# Patient Record
Sex: FEMALE | Race: WHITE | NOT HISPANIC OR LATINO | Employment: OTHER | URBAN - METROPOLITAN AREA
[De-identification: names, ages, dates, MRNs, and addresses within clinical notes are randomized per-mention and may not be internally consistent; named-entity substitution may affect disease eponyms.]

---

## 2017-01-05 ENCOUNTER — OFFICE VISIT (OUTPATIENT)
Dept: URGENT CARE | Facility: RETAIL CLINIC | Age: 77
End: 2017-01-05
Payer: COMMERCIAL

## 2017-01-05 VITALS
OXYGEN SATURATION: 92 % | DIASTOLIC BLOOD PRESSURE: 70 MMHG | HEART RATE: 75 BPM | TEMPERATURE: 100.3 F | SYSTOLIC BLOOD PRESSURE: 132 MMHG | RESPIRATION RATE: 24 BRPM

## 2017-01-05 DIAGNOSIS — J20.9 ACUTE BRONCHITIS WITH COEXISTING CONDITION REQUIRING PROPHYLACTIC TREATMENT: ICD-10-CM

## 2017-01-05 DIAGNOSIS — R05.9 COUGH: Primary | ICD-10-CM

## 2017-01-05 PROCEDURE — 99213 OFFICE O/P EST LOW 20 MIN: CPT | Performed by: NURSE PRACTITIONER

## 2017-01-05 RX ORDER — BENZONATATE 100 MG/1
100-200 CAPSULE ORAL 3 TIMES DAILY PRN
Qty: 42 CAPSULE | Refills: 0 | Status: SHIPPED | OUTPATIENT
Start: 2017-01-05 | End: 2017-01-17

## 2017-01-05 RX ORDER — AZITHROMYCIN 250 MG/1
TABLET, FILM COATED ORAL
Qty: 6 TABLET | Refills: 0 | Status: SHIPPED | OUTPATIENT
Start: 2017-01-05 | End: 2017-01-10

## 2017-01-05 NOTE — PROGRESS NOTES
SUBJECTIVE:   Keyla Mari is a 76 year old female presenting with a chief complaint of fever, nasal congestion, rhinorrhea, cough has also disrupted sleep, ear pain, facial pain/pressure, hoarse voice, headache, myalgias, malaise and SOB worse when lays down.  Onset of symptoms was 4 day(s) ago.  Course of illness is worsening.    Severity moderately severe  Current and Associated symptoms: noted.  Did have a precursor of symptoms a week prior.  Treatment measures tried include Rest.  Predisposing factors include Has notable Hx worth review.    Past Medical History   Diagnosis Date     Closed fracture of unspecified part of neck of femur      Hip fracture     Elevated blood pressure reading without diagnosis of hypertension      Acute, but ill-defined, cerebrovascular disease      Hemorrhage of gastrointestinal tract, unspecified 08/13/2007     Transfer to [facility]     Other and unspecified hyperlipidemia      CVA (cerebral infarction) 2005     HTN (hypertension)      Hyperlipidaemia      PONV (postoperative nausea and vomiting)      Unspecified cerebral artery occlusion with cerebral infarction      History of blood transfusion      after colonoscopy     Current Outpatient Prescriptions   Medication Sig Dispense Refill     azithromycin (ZITHROMAX) 250 MG tablet Take 2 tablets today, then take 1 tablet for the next 4 days. 6 tablet 0     benzonatate (TESSALON) 100 MG capsule Take 1-2 capsules (100-200 mg) by mouth 3 times daily as needed for cough 42 capsule 0     losartan-hydrochlorothiazide (HYZAAR) 100-25 MG per tablet Take 1 tablet by mouth daily 90 tablet 2     atenolol (TENORMIN) 100 MG tablet Take 1 tablet (100 mg) by mouth 2 times daily 180 tablet 2     simvastatin (ZOCOR) 20 MG tablet Take 1 tablet (20 mg) by mouth At Bedtime at bedtime. 90 tablet 2     amLODIPine (NORVASC) 5 MG tablet Take 0.5 tablets (2.5 mg) by mouth daily 45 tablet 3     aspirin 81 MG tablet Take 1 tablet (81 mg) by mouth  daily       [DISCONTINUED] doxazosin (CARDURA) 2 MG tablet Take 1 tablet (2 mg) by mouth At Bedtime One pill at bedtime. 14 tablet 0     Social History   Substance Use Topics     Smoking status: Former Smoker     Quit date: 03/16/1985     Smokeless tobacco: Never Used     Alcohol Use: No       ROS:  Review of systems negative except as stated above.    OBJECTIVE:  /70 mmHg  Pulse 75  Temp(Src) 100.3  F (37.9  C) (Tympanic)  Resp 24  SpO2 92%  GENERAL APPEARANCE: alert, moderate distress, severe distress and cooperative  EYES: EOMI,  PERRL, conjunctiva clear  HENT: ear canals and TM's normal.  Nose and mouth without ulcers, erythema or lesions  NECK: supple, nontender, no lymphadenopathy  RESP: expiratory wheezes throughout and inspiratory wheezes throughout  CV: regular rates and rhythm, normal S1 S2, no murmur noted  ABDOMEN:  soft, nontender, no HSM or masses and bowel sounds normal    ASSESSMENT:  Cough [R05]  Acute bronchitis with coexisting condition requiring prophylactic treatment [J20.9]    PLAN:  azithromycin (ZITHROMAX) 250 MG tablet  benzonatate (TESSALON) 100 MG capsule  Get plenty of rest & drink plenty of fluids (mainly water).  Take OTC, or medications prescribed to treat symptoms.  Mucinex is product known to help loosen congestion (generics are available.).   Dark Honey, such as Joe Wheat Honey has been shown to be helpful in cough management.  Avoid smoke (cigarettes or fireplace/wood burning stoves).  If you develop trouble breathing, swallowing or cough-up blood, immediately go to ER.  Using a vaporizer, humidifier, or steam from hot water to add moisture to the air can help  Follow-up with primary care provider if not improving with in 3 days or symptoms worsen.  A cough may last up to 2 weeks.    Jose KHANNA, MSN, Family NP-C  [facility]  January 5, 2017

## 2017-01-05 NOTE — MR AVS SNAPSHOT
"              After Visit Summary   2017    Keyla Mari    MRN: [id number]           Patient Information     Date Of Birth          1940        Visit Information        Provider Department      2017 11:10 AM Jose Allison APRN [facility]        Today's Diagnoses     Cough    -  1     Acute bronchitis with coexisting condition requiring prophylactic treatment            Follow-ups after your visit        Your next 10 appointments already scheduled     2017 10:40 AM   [facility]    [address] [place] [id number] [phone]           Bring a current list of meds and any records pertaining to this visit.  For Physicals, please bring immunization records and any forms needing to be filled out.  Please arrive 10 minutes early to complete paperwork.              Who to contact     You can reach your care team any time of the day by calling [phone].  Notification of test results:  If you have an abnormal lab result, we will notify you by phone as soon as possible.         Additional Information About Your Visit        [employer] Information     [employer] lets you send messages to your doctor, view your test results, renew your prescriptions, schedule appointments and more. To sign up, go to [email or web address] . Click on \"Log in\" on the left side of the screen, which will take you to the Welcome page. Then click on \"Sign up Now\" on the right side of the page.     You will be asked to enter the access code listed below, as well as some personal information. Please follow the directions to create your username and password.     Your access code is: [email or web address]  Expires: 3/1/2017  1:15 PM     Your access code will  in 90 days. If you need help or a new code, please call your [facility] or [phone].        Care EveryWhere ID     This is your Care " EveryWhere ID. This could be used by other organizations to access your [place] medical records  [id number]        Your Vitals Were     Pulse Temperature Respirations Pulse Oximetry          75 100.3  F (37.9  C) (Tympanic) 24 92%         Blood Pressure from Last 3 Encounters:   01/05/17 132/70   12/13/16 150/76   12/01/16 150/100    Weight from Last 3 Encounters:   12/13/16 155 lb 1.9 oz (70.362 kg)   12/01/16 155 lb 12.8 oz (70.67 kg)   06/15/16 154 lb 12.8 oz (70.217 kg)              Today, you had the following     No orders found for display         Today's Medication Changes          These changes are accurate as of: 1/5/17 12:11 PM.  If you have any questions, ask your nurse or doctor.               Start taking these medicines.        Dose/Directions    azithromycin 250 MG tablet   Commonly known as:  ZITHROMAX   Used for:  Acute bronchitis with coexisting condition requiring prophylactic treatment   Started by:  Jose Allison APRN CNP        Take 2 tablets today, then take 1 tablet for the next 4 days.   Quantity:  6 tablet   Refills:  0       benzonatate 100 MG capsule   Commonly known as:  TESSALON   Used for:  Cough   Started by:  Jose Allison APRN CNP        Dose:  100-200 mg   Take 1-2 capsules (100-200 mg) by mouth 3 times daily as needed for cough   Quantity:  42 capsule   Refills:  0            Where to get your medicines      These medications were sent to [address]  [address], [facility] [address]     Phone:  [phone]    - azithromycin 250 MG tablet  - benzonatate 100 MG capsule             Primary Care Provider Office Phone # Fax #    Weston Raines [phone]       [facility] [address] [facility] [address]        Thank you!     Thank you for choosing [facility]  for your care. Our goal is always to provide you with excellent care. Hearing back from our patients is one way we can  continue to improve our services. Please take a few minutes to complete the written survey that you may receive in the mail after your visit with us. Thank you!             Your Updated Medication List - Protect others around you: Learn how to safely use, store and throw away your medicines at www.disposemymeds.org.          This list is accurate as of: 1/5/17 12:11 PM.  Always use your most recent med list.                   Brand Name Dispense Instructions for use    amLODIPine 5 MG tablet    NORVASC    45 tablet    Take 0.5 tablets (2.5 mg) by mouth daily       aspirin 81 MG tablet      Take 1 tablet (81 mg) by mouth daily       atenolol 100 MG tablet    TENORMIN    180 tablet    Take 1 tablet (100 mg) by mouth 2 times daily       azithromycin 250 MG tablet    ZITHROMAX    6 tablet    Take 2 tablets today, then take 1 tablet for the next 4 days.       benzonatate 100 MG capsule    TESSALON    42 capsule    Take 1-2 capsules (100-200 mg) by mouth 3 times daily as needed for cough       losartan-hydrochlorothiazide 100-25 MG per tablet    HYZAAR    90 tablet    Take 1 tablet by mouth daily       simvastatin 20 MG tablet    ZOCOR    90 tablet    Take 1 tablet (20 mg) by mouth At Bedtime at bedtime.

## 2017-01-17 ENCOUNTER — OFFICE VISIT (OUTPATIENT)
Dept: FAMILY MEDICINE | Facility: CLINIC | Age: 77
End: 2017-01-17
Payer: COMMERCIAL

## 2017-01-17 VITALS
HEIGHT: 63 IN | DIASTOLIC BLOOD PRESSURE: 74 MMHG | BODY MASS INDEX: 26.36 KG/M2 | RESPIRATION RATE: 18 BRPM | OXYGEN SATURATION: 96 % | WEIGHT: 148.8 LBS | HEART RATE: 71 BPM | SYSTOLIC BLOOD PRESSURE: 144 MMHG | TEMPERATURE: 96.9 F

## 2017-01-17 DIAGNOSIS — L57.0 ACTINIC KERATOSIS: Primary | ICD-10-CM

## 2017-01-17 PROCEDURE — 17000 DESTRUCT PREMALG LESION: CPT | Performed by: FAMILY MEDICINE

## 2017-01-17 PROCEDURE — 17003 DESTRUCT PREMALG LES 2-14: CPT | Performed by: FAMILY MEDICINE

## 2017-01-17 ASSESSMENT — PAIN SCALES - GENERAL: PAINLEVEL: NO PAIN (0)

## 2017-01-17 NOTE — MR AVS SNAPSHOT
"              After Visit Summary   2017    Keyla Mari    MRN: [id number]           Patient Information     Date Of Birth          1940        Visit Information        Provider Department      2017 10:40 AM Weston Raines MD [facility]        Today's Diagnoses     Actinic keratosis    -  1        Follow-ups after your visit        Who to contact     If you have questions or need follow up information about today's clinic visit or your schedule please contact [facility] directly at [phone].  Normal or non-critical lab and imaging results will be communicated to you by TeleFliphart, letter or phone within 4 business days after the clinic has received the results. If you do not hear from us within 7 days, please contact the clinic through TeleFliphart or phone. If you have a critical or abnormal lab result, we will notify you by phone as soon as possible.  Submit refill requests through [employer] or call your pharmacy and they will forward the refill request to us. Please allow 3 business days for your refill to be completed.          Additional Information About Your Visit        MyChart Information     [employer] lets you send messages to your doctor, view your test results, renew your prescriptions, schedule appointments and more. To sign up, go to [email or web address] . Click on \"Log in\" on the left side of the screen, which will take you to the Welcome page. Then click on \"Sign up Now\" on the right side of the page.     You will be asked to enter the access code listed below, as well as some personal information. Please follow the directions to create your username and password.     Your access code is: [email or web address]  Expires: 3/1/2017  1:15 PM     Your access code will  in 90 days. If you need help or a new code, please call your [facility] or [phone].        Care EveryWhere ID     This is your Care EveryWhere ID. This could be used by other " "organizations to access your [place] medical records  [id number]        Your Vitals Were     Pulse Temperature Respirations Height BMI (Body Mass Index) Pulse Oximetry    71 96.9  F (36.1  C) (Tympanic) 18 5' 2.7\" (1.593 m) 26.60 kg/m2 96%       Blood Pressure from Last 3 Encounters:   01/17/17 144/74   01/05/17 132/70   12/13/16 150/76    Weight from Last 3 Encounters:   01/17/17 148 lb 12.8 oz (67.495 kg)   12/13/16 155 lb 1.9 oz (70.362 kg)   12/01/16 155 lb 12.8 oz (70.67 kg)              Today, you had the following     No orders found for display       Primary Care Provider Office Phone # Fax #    Weston Raines [phone]       [facility] [address] [place] [address]        Thank you!     Thank you for choosing [facility]  for your care. Our goal is always to provide you with excellent care. Hearing back from our patients is one way we can continue to improve our services. Please take a few minutes to complete the written survey that you may receive in the mail after your visit with us. Thank you!             Your Updated Medication List - Protect others around you: Learn how to safely use, store and throw away your medicines at www.disposemymeds.org.          This list is accurate as of: 1/17/17 12:16 PM.  Always use your most recent med list.                   Brand Name Dispense Instructions for use    amLODIPine 5 MG tablet    NORVASC    45 tablet    Take 0.5 tablets (2.5 mg) by mouth daily       aspirin 81 MG tablet      Take 1 tablet (81 mg) by mouth daily       atenolol 100 MG tablet    TENORMIN    180 tablet    Take 1 tablet (100 mg) by mouth 2 times daily       losartan-hydrochlorothiazide 100-25 MG per tablet    HYZAAR    90 tablet    Take 1 tablet by mouth daily       simvastatin 20 MG tablet    ZOCOR    90 tablet    Take 1 tablet (20 mg) by mouth At Bedtime at bedtime.         "

## 2017-01-17 NOTE — NURSING NOTE
"Chief Complaint   Patient presents with     Derm Problem      Follow up- spot on nose and lower lip.        Initial /74 mmHg  Pulse 71  Temp(Src) 96.9  F (36.1  C) (Tympanic)  Resp 18  Ht 5' 2.7\" (1.593 m)  Wt 148 lb 12.8 oz (67.495 kg)  BMI 26.60 kg/m2  SpO2 96% Estimated body mass index is 26.6 kg/(m^2) as calculated from the following:    Height as of this encounter: 5' 2.7\" (1.593 m).    Weight as of this encounter: 148 lb 12.8 oz (67.495 kg).  BP completed using cuff size: regular  Health Maintenance Due   Topic Date Due     PNEUMOCOCCAL (2 of 2 - PCV13) 10/15/2008     Norma Kim, [facility]      "

## 2017-01-17 NOTE — PROGRESS NOTES
SUBJECTIVE:                                                    Keyla Mari is a 76 year old female who presents to clinic today for the following health issues:    Chief Complaint   Patient presents with     Derm Problem      Follow up- spot on nose and lower lip.       HPI:Keyla presents for treatment of spots on her nose and lower lip. These were biopsied approximately 1 month ago and found to be actinic keratosis so she is here today for cryotherapy as previously discussed.     PROBLEM LIST:  Patient Active Problem List    Diagnosis Date Noted     Lung mass, spiculated in the right upper lobe, 1.1 x 2.2 x 0.6 cm. 05/20/2015     Priority: Medium     HTN (hypertension) 05/13/2015     Priority: Medium     Mandibular soft tissue mass 05/13/2015     Priority: Medium     Nodule of right lung 06/05/2015     Absence of menstruation 07/31/2014     Transient cerebral ischemia 07/20/2014     Diagnosis updated by automated process. Provider to review and confirm.       Cerebral infarction (H) 06/25/2013     Diagnosis updated by automated process. Provider to review and confirm.       Sleep disorder 10/10/2012     Advanced directives, counseling/discussion 07/09/2012     Has a copy already here in clinic she states.  7/9/2012        Proximal humerus fracture 03/07/2011     CKD (chronic kidney disease) stage 3, GFR 30-59 ml/min 02/10/2011     Lumbar radiculopathy 02/09/2011     Hypertension goal BP (blood pressure) < 140/90 01/11/2011     HYPERLIPIDEMIA LDL GOAL <100 10/31/2010     Gout 10/11/2010     Displacement of lumbar intervertebral disc without myelopathy 04/07/2008     Transient cerebral ischemia 01/24/2006     Problem list name updated by automated process. Provider to review        PAST MEDICAL HISTORY:  Past Medical History   Diagnosis Date     Closed fracture of unspecified part of neck of femur      Hip fracture     Elevated blood pressure reading without diagnosis of hypertension      Acute, but  ill-defined, cerebrovascular disease      Hemorrhage of gastrointestinal tract, unspecified 08/13/2007     Transfer to [facility]     Other and unspecified hyperlipidemia      CVA (cerebral infarction) 2005     HTN (hypertension)      Hyperlipidaemia      PONV (postoperative nausea and vomiting)      Unspecified cerebral artery occlusion with cerebral infarction      History of blood transfusion      after colonoscopy     PAST SURGICAL HISTORY:  Past Surgical History   Procedure Laterality Date     Hc dilation/curettage diag/ther non ob  1984     D & C     Hc colonoscopy w biopsy  07/01/07     Colonoscopy  08/13/07     Surgical history of -        Left leg fx--had pinning     Thoracotomy Right 6/5/2015     Procedure: THORACOTOMY;  Surgeon: Yariel Lund MD;  Location: [facility] OR     Lobectomy lung Right 6/5/2015     Procedure: LOBECTOMY LUNG;  Surgeon: Yariel Lund MD;  Location:  OR     MEDICATIONS:  Current Outpatient Prescriptions   Medication Sig Dispense Refill     losartan-hydrochlorothiazide (HYZAAR) 100-25 MG per tablet Take 1 tablet by mouth daily 90 tablet 2     atenolol (TENORMIN) 100 MG tablet Take 1 tablet (100 mg) by mouth 2 times daily 180 tablet 2     simvastatin (ZOCOR) 20 MG tablet Take 1 tablet (20 mg) by mouth At Bedtime at bedtime. 90 tablet 2     amLODIPine (NORVASC) 5 MG tablet Take 0.5 tablets (2.5 mg) by mouth daily 45 tablet 3     aspirin 81 MG tablet Take 1 tablet (81 mg) by mouth daily       [DISCONTINUED] doxazosin (CARDURA) 2 MG tablet Take 1 tablet (2 mg) by mouth At Bedtime One pill at bedtime. 14 tablet 0      ALLERGIES:  Allergies   Allergen Reactions     Indomethacin GI Disturbance     Problem list and histories reviewed & adjusted, as indicated.    ROS:  Constitutional, HEENT, cardiovascular, pulmonary, gi and gu systems are negative, except as otherwise noted.    OBJECTIVE:                                                    /74 mmHg  Pulse 71   "Temp(Src) 96.9  F (36.1  C) (Tympanic)  Resp 18  Ht 5' 2.7\" (1.593 m)  Wt 148 lb 12.8 oz (67.495 kg)  BMI 26.60 kg/m2  SpO2 96%  Body mass index is 26.6 kg/(m^2).  General: Well-appearing female, alert, in no distress  Skin: Nose with small 2x2mm scabbed area of previously biopsied actinic keratosis. Similar rough patch of skin on lower lip, left of midline, outside of vermillion border which was previously biopsied and found to be actinic keratosis. Face is otherwise without worrisome lesions or rashes.    Diagnostic Test Results:  none      ASSESSMENT/PLAN:                                                    (L57.0) Actinic keratosis  (primary encounter diagnosis)  Comment: Keyla had two areas of previously biopsy-confirmed actinic keratosis, one on the tip of her nose, and one on the lower lip, left of midline. These were treated with cryotherapy in the standard fashion using the freeze-thaw technique x5.   Plan:   - Keep areas treated with cryotherapy moist with bacitracin or vaseline to prevent scabbing  - Follow-up in several weeks on progress, explained that re-treatment with cryotherapy will likely be necessary.    I, Linda Reid, am serving as a scribe; to document services personally performed by Weston Raines MD - based on data collection and the provider's statements to me.    Provider Disclosure:  I agree with above History, Review of Systems, Physical exam and Plan. I have reviewed the content of the documentation and have edited it as needed. I have personally performed the services documented here and the documentation accurately represents those services and the decisions I have made.    Electronically signed by:   Weston Raines MD      "

## 2017-01-31 ENCOUNTER — TELEPHONE (OUTPATIENT)
Dept: FAMILY MEDICINE | Facility: CLINIC | Age: 77
End: 2017-01-31

## 2017-01-31 NOTE — TELEPHONE ENCOUNTER
Pt returned call and bp check appt was scheduled.  Thank you,  Helen Constantino  Patient Representative

## 2017-01-31 NOTE — TELEPHONE ENCOUNTER
I have attempted to contact this patient by phone with the following results: left message to return my call on answering machine. Patient needs a free nurse only blood pressure check. Please schedule this for her if she calls back.        Panel Management Review      Patient has the following on her problem list:     Hypertension   Last three blood pressure readings:  BP Readings from Last 3 Encounters:   01/17/17 144/74   01/05/17 132/70   12/13/16 150/76     Blood pressure: Failed    HTN Guidelines:  Age 18-59 BP range:  Less than 140/90  Age 60-85 with Diabetes:  Less than 140/90  Age 60-85 without Diabetes:  less than 150/90      Composite cancer screening  Chart review shows that this patient is due/due soon for the following None  Summary:    Patient is due/failing the following:   BP CHECK    Action needed:   Patient needs nurse only appointment.    Type of outreach:    Phone, left message for patient to call back.     Questions for provider review:    None                                                                                                                                    Norma Kim, [facility]       Chart routed to myself to follow up .

## 2017-02-02 ENCOUNTER — ALLIED HEALTH/NURSE VISIT (OUTPATIENT)
Dept: FAMILY MEDICINE | Facility: CLINIC | Age: 77
End: 2017-02-02
Payer: COMMERCIAL

## 2017-02-02 VITALS — SYSTOLIC BLOOD PRESSURE: 132 MMHG | DIASTOLIC BLOOD PRESSURE: 68 MMHG

## 2017-02-02 DIAGNOSIS — I10 ESSENTIAL HYPERTENSION: Primary | ICD-10-CM

## 2017-02-02 PROCEDURE — 99207 ZZC NO CHARGE NURSE ONLY: CPT

## 2017-02-02 NOTE — NURSING NOTE
"Chief Complaint   Patient presents with     Hypertension     bp check       Initial /80 mmHg Estimated body mass index is 26.60 kg/(m^2) as calculated from the following:    Height as of 1/17/17: 5' 2.7\" (1.593 m).    Weight as of 1/17/17: 148 lb 12.8 oz (67.495 kg).  BP completed using cuff size: regular  Kaylie Whipple MA 2/2/2017        "

## 2017-05-03 DIAGNOSIS — E78.5 HYPERLIPIDEMIA LDL GOAL <100: ICD-10-CM

## 2017-05-03 NOTE — TELEPHONE ENCOUNTER
simvastatin (ZOCOR) 20 MG tablet  Last Written Prescription Date: 5/11/16  Last Fill Quantity: 90, # refills: 2  Last Office Visit with G, P or [facility] prescribing provider: 1/17/17       Lab Results   Component Value Date    CHOL 143 05/11/2016     Lab Results   Component Value Date    HDL 50 05/11/2016     Lab Results   Component Value Date    LDL 70 05/11/2016     Lab Results   Component Value Date    TRIG 113 05/11/2016     Lab Results   Component Value Date    CHOLHDLRATIO 3.2 03/30/2015

## 2017-05-05 RX ORDER — SIMVASTATIN 20 MG
TABLET ORAL
Qty: 90 TABLET | Refills: 0 | Status: SHIPPED | OUTPATIENT
Start: 2017-05-05 | End: 2017-08-01

## 2017-05-05 NOTE — TELEPHONE ENCOUNTER
Medication is being filled for 1 time refill only due to:  Future labs ordered Fasting lipid panel. .     Routing to scheduling for a lab appointment.     Barbi Madden RN

## 2017-05-06 DIAGNOSIS — I10 HYPERTENSION GOAL BP (BLOOD PRESSURE) < 140/90: ICD-10-CM

## 2017-05-08 RX ORDER — LOSARTAN POTASSIUM AND HYDROCHLOROTHIAZIDE 25; 100 MG/1; MG/1
TABLET ORAL
Qty: 90 TABLET | Refills: 1 | Status: SHIPPED | OUTPATIENT
Start: 2017-05-08 | End: 2017-08-07 | Stop reason: ALTCHOICE

## 2017-05-08 RX ORDER — ATENOLOL 100 MG/1
TABLET ORAL
Qty: 180 TABLET | Refills: 1 | Status: SHIPPED | OUTPATIENT
Start: 2017-05-08 | End: 2017-11-07

## 2017-05-08 NOTE — TELEPHONE ENCOUNTER
Prescription approved per [facility] Refill Protocol.    Barbi Madden RN

## 2017-05-08 NOTE — TELEPHONE ENCOUNTER
losartan-hydrochlorothiazide (HYZAAR) 100-25 MG per tablet     Last Written Prescription Date: 5/11/16  Last Fill Quantity: 90, # refills: 2  Last Office Visit with [facility], [facility] or  Health prescribing provider: 1/17/17       Potassium   Date Value Ref Range Status   05/11/2016 3.3 (L) 3.4 - 5.3 mmol/L Final     Creatinine   Date Value Ref Range Status   05/11/2016 0.90 0.52 - 1.04 mg/dL Final     BP Readings from Last 3 Encounters:   02/02/17 132/68   01/17/17 144/74   01/05/17 132/70           atenolol (TENORMIN) 100 MG tablet  Last Written Prescription Date: 5/11/16  Last Fill Quantity: 180, # refills: 2  Last Office Visit with [facility], [facility] or [facility] prescribing provider: 1/17/17       Potassium   Date Value Ref Range Status   05/11/2016 3.3 (L) 3.4 - 5.3 mmol/L Final     Creatinine   Date Value Ref Range Status   05/11/2016 0.90 0.52 - 1.04 mg/dL Final     BP Readings from Last 3 Encounters:   02/02/17 132/68   01/17/17 144/74   01/05/17 132/70

## 2017-05-17 DIAGNOSIS — I10 HYPERTENSION GOAL BP (BLOOD PRESSURE) < 140/90: ICD-10-CM

## 2017-05-17 DIAGNOSIS — E78.5 HYPERLIPIDEMIA LDL GOAL <100: ICD-10-CM

## 2017-05-17 RX ORDER — AMLODIPINE BESYLATE 5 MG/1
TABLET ORAL
Qty: 45 TABLET | Refills: 2 | Status: SHIPPED | OUTPATIENT
Start: 2017-05-17 | End: 2017-10-09 | Stop reason: DRUGHIGH

## 2017-05-17 NOTE — TELEPHONE ENCOUNTER
amlodipine      Last Written Prescription Date: 05/02/16  Last Fill Quantity: 45, # refills: 3  Last Office Visit with [facility], [facility] or [facility] prescribing provider: 01/17/17       Potassium   Date Value Ref Range Status   05/11/2016 3.3 (L) 3.4 - 5.3 mmol/L Final     Creatinine   Date Value Ref Range Status   05/11/2016 0.90 0.52 - 1.04 mg/dL Final     BP Readings from Last 3 Encounters:   02/02/17 132/68   01/17/17 144/74   01/05/17 132/70

## 2017-08-01 DIAGNOSIS — E78.5 HYPERLIPIDEMIA LDL GOAL <100: ICD-10-CM

## 2017-08-01 NOTE — TELEPHONE ENCOUNTER
Routing refill request to provider for review/approval because:  Labs not current:  Fasting lipid panel.     Routing to schedulers for lab appointment.   Routing to PCP for refill if appropriate.     Barbi Madden RN

## 2017-08-01 NOTE — TELEPHONE ENCOUNTER
simvastatin (ZOCOR) 20 MG tablet     Last Written Prescription Date: 5/5/17  Last Fill Quantity: 90, # refills: 0  Last Office Visit with G, P or [facility] prescribing provider: 1/17/17       Lab Results   Component Value Date    CHOL 143 05/11/2016     Lab Results   Component Value Date    HDL 50 05/11/2016     Lab Results   Component Value Date    LDL 70 05/11/2016     Lab Results   Component Value Date    TRIG 113 05/11/2016     Lab Results   Component Value Date    CHOLHDLRATIO 3.2 03/30/2015

## 2017-08-03 DIAGNOSIS — E78.5 HYPERLIPIDEMIA LDL GOAL <100: ICD-10-CM

## 2017-08-03 LAB
CHOLEST SERPL-MCNC: 144 MG/DL
HDLC SERPL-MCNC: 55 MG/DL
LDLC SERPL CALC-MCNC: 67 MG/DL
NONHDLC SERPL-MCNC: 89 MG/DL
TRIGL SERPL-MCNC: 108 MG/DL

## 2017-08-03 PROCEDURE — 36415 COLL VENOUS BLD VENIPUNCTURE: CPT | Performed by: FAMILY MEDICINE

## 2017-08-03 PROCEDURE — 80061 LIPID PANEL: CPT | Performed by: FAMILY MEDICINE

## 2017-08-03 RX ORDER — SIMVASTATIN 20 MG
TABLET ORAL
Qty: 90 TABLET | Refills: 0 | Status: SHIPPED | OUTPATIENT
Start: 2017-08-03 | End: 2017-08-07

## 2017-08-07 ENCOUNTER — OFFICE VISIT (OUTPATIENT)
Dept: FAMILY MEDICINE | Facility: CLINIC | Age: 77
End: 2017-08-07
Payer: COMMERCIAL

## 2017-08-07 ENCOUNTER — TELEPHONE (OUTPATIENT)
Dept: FAMILY MEDICINE | Facility: CLINIC | Age: 77
End: 2017-08-07

## 2017-08-07 VITALS
DIASTOLIC BLOOD PRESSURE: 76 MMHG | TEMPERATURE: 97.5 F | WEIGHT: 156 LBS | BODY MASS INDEX: 27.9 KG/M2 | SYSTOLIC BLOOD PRESSURE: 150 MMHG | HEART RATE: 67 BPM | OXYGEN SATURATION: 94 % | RESPIRATION RATE: 12 BRPM

## 2017-08-07 DIAGNOSIS — E78.5 HYPERLIPIDEMIA LDL GOAL <100: Primary | ICD-10-CM

## 2017-08-07 DIAGNOSIS — M10.9 GOUT: Primary | ICD-10-CM

## 2017-08-07 DIAGNOSIS — N18.30 CKD (CHRONIC KIDNEY DISEASE) STAGE 3, GFR 30-59 ML/MIN (H): ICD-10-CM

## 2017-08-07 DIAGNOSIS — M1A.9XX0 CHRONIC GOUT INVOLVING TOE OF LEFT FOOT, UNSPECIFIED CAUSE: ICD-10-CM

## 2017-08-07 DIAGNOSIS — Z23 ENCOUNTER FOR IMMUNIZATION: ICD-10-CM

## 2017-08-07 DIAGNOSIS — I10 HYPERTENSION GOAL BP (BLOOD PRESSURE) < 150/90: ICD-10-CM

## 2017-08-07 LAB
ANION GAP SERPL CALCULATED.3IONS-SCNC: 9 MMOL/L (ref 3–14)
BUN SERPL-MCNC: 12 MG/DL (ref 7–30)
CALCIUM SERPL-MCNC: 9.9 MG/DL (ref 8.5–10.1)
CHLORIDE SERPL-SCNC: 97 MMOL/L (ref 94–109)
CO2 SERPL-SCNC: 29 MMOL/L (ref 20–32)
CREAT SERPL-MCNC: 0.9 MG/DL (ref 0.52–1.04)
CREAT UR-MCNC: 74 MG/DL
ERYTHROCYTE [DISTWIDTH] IN BLOOD BY AUTOMATED COUNT: 13.6 % (ref 10–15)
GFR SERPL CREATININE-BSD FRML MDRD: 61 ML/MIN/1.7M2
GLUCOSE SERPL-MCNC: 114 MG/DL (ref 70–99)
HCT VFR BLD AUTO: 42.6 % (ref 35–47)
HGB BLD-MCNC: 14.3 G/DL (ref 11.7–15.7)
MCH RBC QN AUTO: 29.7 PG (ref 26.5–33)
MCHC RBC AUTO-ENTMCNC: 33.6 G/DL (ref 31.5–36.5)
MCV RBC AUTO: 88 FL (ref 78–100)
MICROALBUMIN UR-MCNC: 11 MG/L
MICROALBUMIN/CREAT UR: 14.78 MG/G CR (ref 0–25)
PLATELET # BLD AUTO: 231 10E9/L (ref 150–450)
POTASSIUM SERPL-SCNC: 3.7 MMOL/L (ref 3.4–5.3)
RBC # BLD AUTO: 4.82 10E12/L (ref 3.8–5.2)
SODIUM SERPL-SCNC: 135 MMOL/L (ref 133–144)
URATE SERPL-MCNC: 7.5 MG/DL (ref 2.6–6)
WBC # BLD AUTO: 6.9 10E9/L (ref 4–11)

## 2017-08-07 PROCEDURE — 80048 BASIC METABOLIC PNL TOTAL CA: CPT | Performed by: FAMILY MEDICINE

## 2017-08-07 PROCEDURE — 90670 PCV13 VACCINE IM: CPT | Performed by: FAMILY MEDICINE

## 2017-08-07 PROCEDURE — 84550 ASSAY OF BLOOD/URIC ACID: CPT | Performed by: FAMILY MEDICINE

## 2017-08-07 PROCEDURE — 36415 COLL VENOUS BLD VENIPUNCTURE: CPT | Performed by: FAMILY MEDICINE

## 2017-08-07 PROCEDURE — 85027 COMPLETE CBC AUTOMATED: CPT | Performed by: FAMILY MEDICINE

## 2017-08-07 PROCEDURE — 99213 OFFICE O/P EST LOW 20 MIN: CPT | Mod: 25 | Performed by: FAMILY MEDICINE

## 2017-08-07 PROCEDURE — 82043 UR ALBUMIN QUANTITATIVE: CPT | Performed by: FAMILY MEDICINE

## 2017-08-07 PROCEDURE — G0009 ADMIN PNEUMOCOCCAL VACCINE: HCPCS | Performed by: FAMILY MEDICINE

## 2017-08-07 RX ORDER — LOSARTAN POTASSIUM 100 MG/1
100 TABLET ORAL DAILY
Qty: 90 TABLET | Refills: 1 | Status: SHIPPED | OUTPATIENT
Start: 2017-08-07 | End: 2018-01-29

## 2017-08-07 RX ORDER — SIMVASTATIN 20 MG
20 TABLET ORAL AT BEDTIME
Qty: 90 TABLET | Refills: 3 | Status: SHIPPED | OUTPATIENT
Start: 2017-08-07 | End: 2018-10-23

## 2017-08-07 ASSESSMENT — PAIN SCALES - GENERAL: PAINLEVEL: MODERATE PAIN (5)

## 2017-08-07 NOTE — MR AVS SNAPSHOT
"              After Visit Summary   8/7/2017    Keyla Mari    MRN: [id number]           Patient Information     Date Of Birth          1940        Visit Information        Provider Department      8/7/2017 2:50 PM Weston Raines MD [facility]        Today's Diagnoses     Gout    -  1    Hyperlipidemia LDL goal <100        CKD (chronic kidney disease) stage 3, GFR 30-59 ml/min        Hypertension goal BP (blood pressure) < 150/90        Encounter for immunization           Follow-ups after your visit        Your next 10 appointments already scheduled     Sep 12, 2017  9:00 AM CDT   SHORT with Weston Raines MD   [facility] ([facility])    [address] [place] [id number] [phone]              Who to contact     If you have questions or need follow up information about today's clinic visit or your schedule please contact [facility] directly at [phone].  Normal or non-critical lab and imaging results will be communicated to you by MyChart, letter or phone within 4 business days after the clinic has received the results. If you do not hear from us within 7 days, please contact the clinic through [email or web address] or phone. If you have a critical or abnormal lab result, we will notify you by phone as soon as possible.  Submit refill requests through CliqSearch or call your pharmacy and they will forward the refill request to us. Please allow 3 business days for your refill to be completed.          Additional Information About Your Visit        [email or web address] Information     CliqSearch lets you send messages to your doctor, view your test results, renew your prescriptions, schedule appointments and more. To sign up, go to [email or web address] . Click on \"Log in\" on the left side of the screen, which will take you to the Welcome page. Then click on \"Sign up Now\" on the right side of the page.     You will be asked to enter the access code " listed below, as well as some personal information. Please follow the directions to create your username and password.     Your access code is: [email or web address]  Expires: 2017  4:59 PM     Your access code will  in 90 days. If you need help or a new code, please call your [place] clinic or [phone].        Care EveryWhere ID     This is your Care EveryWhere ID. This could be used by other organizations to access your [place] medical records  [id number]        Your Vitals Were     Pulse Temperature Respirations Pulse Oximetry BMI (Body Mass Index)       67 97.5  F (36.4  C) (Tympanic) 12 94% 27.9 kg/m2        Blood Pressure from Last 3 Encounters:   17 150/76   17 132/68   17 144/74    Weight from Last 3 Encounters:   17 156 lb (70.8 kg)   17 148 lb 12.8 oz (67.5 kg)   16 155 lb 1.9 oz (70.4 kg)              We Performed the Following     ADMIN 1st VACCINE     Basic metabolic panel     CBC with platelets     PNEUMOCOCCAL CONJ VACCINE 13 VALENT IM     Uric acid          Today's Medication Changes          These changes are accurate as of: 17  4:59 PM.  If you have any questions, ask your nurse or doctor.               Start taking these medicines.        Dose/Directions    losartan 100 MG tablet   Commonly known as:  COZAAR   Used for:  Hypertension goal BP (blood pressure) < 150/90   Started by:  Weston Raines MD        Dose:  100 mg   Take 1 tablet (100 mg) by mouth daily   Quantity:  90 tablet   Refills:  1         These medicines have changed or have updated prescriptions.        Dose/Directions    simvastatin 20 MG tablet   Commonly known as:  ZOCOR   This may have changed:  See the new instructions.   Used for:  Hyperlipidemia LDL goal <100   Changed by:  Weston Raines MD        Dose:  20 mg   Take 1 tablet (20 mg) by mouth At Bedtime   Quantity:  90 tablet   Refills:  3         Stop taking these medicines if you haven't already. Please contact your  care team if you have questions.     losartan-hydrochlorothiazide 100-25 MG per tablet   Commonly known as:  HYZAAR   Stopped by:  Weston Raines MD                Where to get your medicines      These medications were sent to [facility] - [place], [place] - [address]  [address], [facility] [address]     Phone:  [phone]     losartan 100 MG tablet    simvastatin 20 MG tablet                Primary Care Provider Office Phone # Fax #    Weston Raines [phone]       [facility] [address] [facility] [address]        Equal Access to Services     [facility]: Hadii aad ku hadasho Soomaali, waaxda luqadaha, qaybta kaalmada adeegyada, waxay idiin hayaan adeeg kharaeverett milligan . So [facility] [phone].    ATENCIÓN: Si habla español, tiene a singh disposición servicios gratuitos de asistencia lingüística. [facility] [phone].    We comply with applicable federal civil rights laws and [place] laws. We do not discriminate on the basis of race, color, national origin, age, disability sex, sexual orientation or gender identity.            Thank you!     Thank you for choosing [facility]  for your care. Our goal is always to provide you with excellent care. Hearing back from our patients is one way we can continue to improve our services. Please take a few minutes to complete the written survey that you may receive in the mail after your visit with us. Thank you!             Your Updated Medication List - Protect others around you: Learn how to safely use, store and throw away your medicines at www.disposemymeds.org.          This list is accurate as of: 8/7/17  4:59 PM.  Always use your most recent med list.                   Brand Name Dispense Instructions for use Diagnosis    amLODIPine 5 MG tablet    NORVASC    45 tablet    TAKE ONE-HALF TABLET BY MOUTH EVERY DAY    Hypertension goal BP (blood pressure) < 140/90, Hyperlipidemia LDL goal <100        aspirin 81 MG tablet      Take 1 tablet (81 mg) by mouth daily        atenolol 100 MG tablet    TENORMIN    180 tablet    TAKE ONE TABLET BY MOUTH TWO TIMES A DAY    Hypertension goal BP (blood pressure) < 140/90       losartan 100 MG tablet    COZAAR    90 tablet    Take 1 tablet (100 mg) by mouth daily    Hypertension goal BP (blood pressure) < 150/90       simvastatin 20 MG tablet    ZOCOR    90 tablet    Take 1 tablet (20 mg) by mouth At Bedtime    Hyperlipidemia LDL goal <100

## 2017-08-07 NOTE — TELEPHONE ENCOUNTER
Reason for Call:  Same Day Appointment, Requested Provider:  Weston Raines M.D.    PCP: Weston Raines    Reason for visit: Patient thinks her gout is back. Would like to be seen today by Dr. Raines     Duration of symptoms: started Friday    Have you been treated for this in the past? Yes    Additional comments:     Can we leave a detailed message on this number? YES    Phone number patient can be reached at: Home number on file [phone] (home)    Best Time: any    Call taken on 8/7/2017 at 8:00 AM by Tatiana Engel

## 2017-08-07 NOTE — PROGRESS NOTES
"Gout/ single inflamed joint   Onset: Friday, 3 days ago    Description:   Location: bottom of left foot   Swelling: YES, throughout foot  Redness: YES  Pain: YES, 10/10 at it's worse; Unable to ambulate on Saturday    Intensity: severe    Progression of Symptoms:  Improving, currently 4/10 in severity    Accompanying Signs & Symptoms:  Fevers: no     History:   Trauma to the area: no   Previous history of gout: YES   Recent illness:  no     Precipitating factors:   Diet-rich in purine: no  Alcohol use: no   Diuretic use: YES; Losartan + HCTZ    Alleviating factors:  None    Therapies Tried and outcome: none; improved on it's own    Physical exam: mild swelling in left foot, mainly throughout mid foot. Area is cool, no erythema. Pulses are strong and equal in bilateral LE.     Assessment and Plan:  (M10.9) Gout  (primary encounter diagnosis)  Comment: Serum uric acid level 7.5; Likely acute gout flare up; Reports she experiences this every \"couple of weeks\". Unable to take indomethacin d/t GI intolerance.  Plan:  D/C HCTZ; If no improvement over next 4 weeks, will discuss the option of starting Allopurinol    (E78.5) Hyperlipidemia LDL goal <100  Comment: Requesting refills, All required lab work ordered  Plan: simvastatin (ZOCOR) 20 MG tablet refills for 1 year            (N18.3) CKD (chronic kidney disease) stage 3, GFR 30-59 ml/min  Comment: Lab work today  Plan: Basic metabolic panel, CBC with platelets,         MICROALBUMIN            (I10) Hypertension goal BP (blood pressure) < 150/90  Comment: Appropriate BP for age.  Plan: losartan (COZAAR) 100 MG tablet        Recheck BP in 4-6 weeks    Patient seen and discussed with Dr. Raines. Assessment and plan reviewed with Dr. Raines and agreed upon as documented above.     April Natarajan, SPA-3  [facility] Physician Assistant Studies          I  obtained additional history, ROS, and  performed a additional appropriate examination. We then discussed the " History, ROS, and Physical examination, that was performed by both the student and myself.  I then made a diagnosis, and treatment plan and this was transcribed by the student as noted above and was reviewed by myself and signed of on at the time of service.     Weston Raines MD

## 2017-08-07 NOTE — NURSING NOTE
Screening Questionnaire for Adult Immunization    Are you sick today?   No   Do you have allergies to medications, food, a vaccine component or latex?   Yes   Have you ever had a serious reaction after receiving a vaccination?   No   Do you have a long-term health problem with heart disease, lung disease, asthma, kidney disease, metabolic disease (e.g. diabetes), anemia, or other blood disorder?   No   Do you have cancer, leukemia, HIV/AIDS, or any other immune system problem?   No   In the past 3 months, have you taken medications that affect  your immune system, such as prednisone, other steroids, or anticancer drugs; drugs for the treatment of rheumatoid arthritis, Crohn s disease, or psoriasis; or have you had radiation treatments?   No   Have you had a seizure, or a brain or other nervous system problem?   No   During the past year, have you received a transfusion of blood or blood     products, or been given immune (gamma) globulin or antiviral drug?   No   For women: Are you pregnant or is there a chance you could become        pregnant during the next month?   No   Have you received any vaccinations in the past 4 weeks?   No     Immunization questionnaire was positive for at least one answer.  Notified provider.      MNVFC doesn't apply on this patient    Per orders of Dr. Raines, injection of PCV13 given by Brisa Giang. Patient instructed to remain in clinic for 15 minutes afterwards, and to report any adverse reaction to me immediately.       Screening performed by Brisa Giang on 8/7/2017 at 3:40 PM.

## 2017-08-07 NOTE — NURSING NOTE
"Chief Complaint   Patient presents with     Musculoskeletal Problem     left foot pain- her whole foot is painful. Hurt worse over the weekend. Pain now 5/10 x4d       Initial /76 (BP Location: Left arm, Patient Position: Chair, Cuff Size: Adult Regular)  Pulse 67  Temp 97.5  F (36.4  C) (Tympanic)  Resp 12  Wt 156 lb (70.8 kg)  SpO2 94%  BMI 27.9 kg/m2 Estimated body mass index is 27.9 kg/(m^2) as calculated from the following:    Height as of 1/17/17: 5' 2.7\" (1.593 m).    Weight as of this encounter: 156 lb (70.8 kg).  Medication Reconciliation: complete   Health Maintenance Due   Topic Date Due     PNEUMOCOCCAL (2 of 2 - PCV13) 10/15/2008     CREATININE Q1 YEAR  05/11/2017     BMP Q1 YR  05/11/2017     HEMOGLOBIN Q1 YR  05/11/2017     MICROALBUMIN Q1 YEAR  05/11/2017     FALL RISK ASSESSMENT  06/15/2017     ADVANCE DIRECTIVE PLANNING Q5 YRS  07/09/2017     Norma Kim, [facility]      "

## 2017-08-11 ENCOUNTER — NURSE TRIAGE (OUTPATIENT)
Dept: NURSING | Facility: CLINIC | Age: 77
End: 2017-08-11

## 2017-08-11 ENCOUNTER — TELEPHONE (OUTPATIENT)
Dept: NURSING | Facility: CLINIC | Age: 77
End: 2017-08-11

## 2017-08-11 DIAGNOSIS — M10.9 GOUT: Primary | ICD-10-CM

## 2017-08-11 RX ORDER — PREDNISONE 20 MG/1
20 TABLET ORAL DAILY
Qty: 5 TABLET | Refills: 0 | Status: SHIPPED | OUTPATIENT
Start: 2017-08-11 | End: 2017-09-12

## 2017-08-11 NOTE — TELEPHONE ENCOUNTER
"  Reason for Disposition    Caller has NON-URGENT medication question about med that PCP prescribed and triager unable to answer question    Additional Information    Negative: Drug overdose and nurse unable to answer question    Negative: Caller requesting information not related to medicine    Negative: Caller requesting a prescription for Strep throat and has a positive culture result    Negative: Rash while taking a medication or within 3 days of stopping it    Negative: Immunization reaction suspected    Negative: [1] Asthma and [2] having symptoms of asthma (cough, wheezing, etc)    Negative: MORE THAN A DOUBLE DOSE of a prescription or over-the-counter (OTC) drug    Negative: [1] DOUBLE DOSE (an extra dose or lesser amount) of over-the-counter (OTC) drug AND [2] any symptoms (e.g., dizziness, nausea, pain, sleepiness)    Negative: [1] DOUBLE DOSE (an extra dose or lesser amount) of prescription drug AND [2] any symptoms (e.g., dizziness, nausea, pain, sleepiness)    Negative: Took another person's prescription drug    Negative: [1] DOUBLE DOSE (an extra dose or lesser amount) of prescription drug AND [2] NO symptoms (Exception: a double dose of antibiotics)    Negative: Diabetes drug error or overdose (e.g., insulin or extra dose)    Negative: [1] Request for URGENT new prescription or refill of \"essential\" medication (i.e., likelihood of harm to patient if not taken) AND [2] triager unable to fill per unit policy    Negative: [1] Prescription not at pharmacy AND [2] was prescribed today by PCP    Negative: Pharmacy calling with prescription questions and triager unable to answer question    Negative: Caller has URGENT medication question about med that PCP prescribed and triager unable to answer question    Answer Assessment - Initial Assessment Questions  1. SYMPTOMS: \"Do you have any symptoms?\"      Gout in  Foot, swelling and pain   2. SEVERITY: If symptoms are present, ask \"Are they mild, moderate or " "severe?\"     Moderate    Protocols used: MEDICATION QUESTION CALL-ADULT-JD Olsen was seen for gout on Monday, and was advised to stop use of  HCTZ, and if no improvement in symptoms to  check back in 4 weeks. She thought the provider was sending a prescription for her, and has been to the pharmacy  Looking for this. No prescription was  Advised at that visit, no medication to send. The foot continues to be swollen, red and painful. She has not tolerated indomethacin in past due to GI issues, and no  rx was given . She tried Ibuprofen once this week with no change in pain .  Today she  Thinks the foot may be more swollen, and asks what to do . A recheck of  Symptoms  In urgent care is advised to see if provider will get her something to relieve symptoms without waiting for primary on Monday .   "

## 2017-08-12 NOTE — TELEPHONE ENCOUNTER
"Seen in clinic by PCP, Dr. Raines on 8/7/17. Having acute gout flare up in L foot. Cannot take indomethicin due to GI intolerance. Visit note states:      Assessment and Plan:  (M10.9) Gout  (primary encounter diagnosis)  Comment: Serum uric acid level 7.5; Likely acute gout flare up; Reports she experiences this every \"couple of weeks\". Unable to take indomethacin d/t GI intolerance.  Plan:  D/C HCTZ; If no improvement over next 4 weeks, will discuss the option of starting Allopurinol    [facility] states pt there now. Pt stated to pharmacist that she spoke w/ someone at the [facility] today (8/11) about getting medication today for her gout as she is having a lot of pain. Pt states to pharmacist she was told an Rx would be prescribed for her gout today. No record of this conversation in EHR and no Rx for gout sent today. Pt states she does not want to wait;  Wants med for her gout today. On-call Dr. Christiaanson called on cell @7:10pm. Pt has CKD, state 3 so cannot use colchicine. Allopurinol not appropriate for acute flare up. Order received: prednisone 20mg by mouth daily for 5 days; pt to call clinic on Mon 8/14 if not improving per Dr. Magallanes. Rx sent to [facility]. Pharmacist will discuss w/ patient. Barbie Reynolds RN/FNA       "

## 2017-08-12 NOTE — TELEPHONE ENCOUNTER
"  Reason for Disposition    [1] Prescription not at pharmacy AND [2] was prescribed today by PCP    Additional Information    Negative: Drug overdose and nurse unable to answer question    Negative: Caller requesting information not related to medicine    Negative: Caller requesting a prescription for Strep throat and has a positive culture result    Negative: Rash while taking a medication or within 3 days of stopping it    Negative: Immunization reaction suspected    Negative: [1] Asthma and [2] having symptoms of asthma (cough, wheezing, etc)    Negative: MORE THAN A DOUBLE DOSE of a prescription or over-the-counter (OTC) drug    Negative: [1] DOUBLE DOSE (an extra dose or lesser amount) of over-the-counter (OTC) drug AND [2] any symptoms (e.g., dizziness, nausea, pain, sleepiness)    Negative: [1] DOUBLE DOSE (an extra dose or lesser amount) of prescription drug AND [2] any symptoms (e.g., dizziness, nausea, pain, sleepiness)    Negative: Took another person's prescription drug    Negative: [1] DOUBLE DOSE (an extra dose or lesser amount) of prescription drug AND [2] NO symptoms (Exception: a double dose of antibiotics)    Negative: Diabetes drug error or overdose (e.g., insulin or extra dose)    Negative: [1] Request for URGENT new prescription or refill of \"essential\" medication (i.e., likelihood of harm to patient if not taken) AND [2] triager unable to fill per unit policy    Protocols used: MEDICATION QUESTION CALL-ADULT-    "

## 2017-09-12 ENCOUNTER — OFFICE VISIT (OUTPATIENT)
Dept: FAMILY MEDICINE | Facility: CLINIC | Age: 77
End: 2017-09-12
Payer: COMMERCIAL

## 2017-09-12 VITALS
SYSTOLIC BLOOD PRESSURE: 138 MMHG | WEIGHT: 156 LBS | RESPIRATION RATE: 12 BRPM | OXYGEN SATURATION: 98 % | TEMPERATURE: 96.4 F | HEART RATE: 66 BPM | BODY MASS INDEX: 27.9 KG/M2 | DIASTOLIC BLOOD PRESSURE: 78 MMHG

## 2017-09-12 DIAGNOSIS — M1A.0790 IDIOPATHIC CHRONIC GOUT OF FOOT WITHOUT TOPHUS, UNSPECIFIED LATERALITY: Primary | ICD-10-CM

## 2017-09-12 DIAGNOSIS — Z23 NEED FOR PROPHYLACTIC VACCINATION AND INOCULATION AGAINST INFLUENZA: ICD-10-CM

## 2017-09-12 PROCEDURE — 99214 OFFICE O/P EST MOD 30 MIN: CPT | Mod: 25 | Performed by: FAMILY MEDICINE

## 2017-09-12 PROCEDURE — 90662 IIV NO PRSV INCREASED AG IM: CPT | Performed by: FAMILY MEDICINE

## 2017-09-12 PROCEDURE — G0008 ADMIN INFLUENZA VIRUS VAC: HCPCS | Performed by: FAMILY MEDICINE

## 2017-09-12 RX ORDER — PREDNISONE 20 MG/1
40 TABLET ORAL DAILY
Qty: 60 TABLET | Refills: 0 | Status: SHIPPED | OUTPATIENT
Start: 2017-09-12 | End: 2017-09-17

## 2017-09-12 RX ORDER — ALLOPURINOL 100 MG/1
100 TABLET ORAL DAILY
Qty: 90 TABLET | Refills: 1 | Status: SHIPPED | OUTPATIENT
Start: 2017-09-12 | End: 2017-10-09

## 2017-09-12 ASSESSMENT — PAIN SCALES - GENERAL: PAINLEVEL: MODERATE PAIN (5)

## 2017-09-12 NOTE — MR AVS SNAPSHOT
"              After Visit Summary   2017    Keyla Mari    MRN: [id number]           Patient Information     Date Of Birth          1940        Visit Information        Provider Department      2017 9:00 AM Weston Raines MD [facility]        Today's Diagnoses     Idiopathic chronic gout of foot without tophus, unspecified laterality    -  1    Need for prophylactic vaccination and inoculation against influenza           Follow-ups after your visit        Who to contact     If you have questions or need follow up information about today's clinic visit or your schedule please contact [facility] directly at [phone].  Normal or non-critical lab and imaging results will be communicated to you by MyChart, letter or phone within 4 business days after the clinic has received the results. If you do not hear from us within 7 days, please contact the clinic through [email or web address] or phone. If you have a critical or abnormal lab result, we will notify you by phone as soon as possible.  Submit refill requests through [employer] or call your pharmacy and they will forward the refill request to us. Please allow 3 business days for your refill to be completed.          Additional Information About Your Visit        MyChart Information     [employer] lets you send messages to your doctor, view your test results, renew your prescriptions, schedule appointments and more. To sign up, go to [email or web address] . Click on \"Log in\" on the left side of the screen, which will take you to the Welcome page. Then click on \"Sign up Now\" on the right side of the page.     You will be asked to enter the access code listed below, as well as some personal information. Please follow the directions to create your username and password.     Your access code is: [email or web address]  Expires: 2017  4:59 PM     Your access code will  in 90 days. If you need help or a new code, please call your " [facility] or [phone].        Care EveryWhere ID     This is your Care EveryWhere ID. This could be used by other organizations to access your [facility] medical records  [id number]        Your Vitals Were     Pulse Temperature Respirations Pulse Oximetry BMI (Body Mass Index)       66 96.4  F (35.8  C) (Tympanic) 12 98% 27.9 kg/m2        Blood Pressure from Last 3 Encounters:   09/12/17 138/78   08/07/17 150/76   02/02/17 132/68    Weight from Last 3 Encounters:   09/12/17 156 lb (70.8 kg)   08/07/17 156 lb (70.8 kg)   01/17/17 148 lb 12.8 oz (67.5 kg)              We Performed the Following     FLU VACCINE, INCREASED ANTIGEN, PRESV FREE, AGE 65+ [94659]     Vaccine Administration, Initial [60054]          Today's Medication Changes          These changes are accurate as of: 9/12/17  9:50 AM.  If you have any questions, ask your nurse or doctor.               Start taking these medicines.        Dose/Directions    allopurinol 100 MG tablet   Commonly known as:  ZYLOPRIM   Used for:  Idiopathic chronic gout of foot without tophus, unspecified laterality   Started by:  Weston Raines MD        Dose:  100 mg   Take 1 tablet (100 mg) by mouth daily   Quantity:  90 tablet   Refills:  1       predniSONE 20 MG tablet   Commonly known as:  DELTASONE   Used for:  Idiopathic chronic gout of foot without tophus, unspecified laterality   Started by:  Weston Raines MD        Dose:  40 mg   Take 2 tablets (40 mg) by mouth daily for 5 days May repeat with gout episodes   Quantity:  60 tablet   Refills:  0            Where to get your medicines      These medications were sent to [facility] [id number] - [place] - [address]  [address], [facility] [address]     Phone:  [phone]     allopurinol 100 MG tablet    predniSONE 20 MG tablet                Primary Care Provider Office Phone # Fax #    Weston Raines [phone] [address] [place] [address]        Equal Access to  Services     [facility]: Hadii aad ku hadsultanaraj Sopablo, waaxda luqadaha, qaybta kaalmada shadiajerardoannalisa, jannet milligan . So [facility] [phone].    ATENCIÓN: Si phoebe elizabeth, tiene a singh disposición servicios gratuitos de asistencia lingüística. Llame al [phone].    We comply with applicable federal civil rights laws and [place] laws. We do not discriminate on the basis of race, color, national origin, age, disability sex, sexual orientation or gender identity.            Thank you!     Thank you for choosing [facility]  for your care. Our goal is always to provide you with excellent care. Hearing back from our patients is one way we can continue to improve our services. Please take a few minutes to complete the written survey that you may receive in the mail after your visit with us. Thank you!             Your Updated Medication List - Protect others around you: Learn how to safely use, store and throw away your medicines at www.disposemymeds.org.          This list is accurate as of: 9/12/17  9:50 AM.  Always use your most recent med list.                   Brand Name Dispense Instructions for use Diagnosis    allopurinol 100 MG tablet    ZYLOPRIM    90 tablet    Take 1 tablet (100 mg) by mouth daily    Idiopathic chronic gout of foot without tophus, unspecified laterality       amLODIPine 5 MG tablet    NORVASC    45 tablet    TAKE ONE-HALF TABLET BY MOUTH EVERY DAY    Hypertension goal BP (blood pressure) < 140/90, Hyperlipidemia LDL goal <100       aspirin 81 MG tablet      Take 1 tablet (81 mg) by mouth daily        atenolol 100 MG tablet    TENORMIN    180 tablet    TAKE ONE TABLET BY MOUTH TWO TIMES A DAY    Hypertension goal BP (blood pressure) < 140/90       losartan 100 MG tablet    COZAAR    90 tablet    Take 1 tablet (100 mg) by mouth daily    Hypertension goal BP (blood pressure) < 150/90       predniSONE 20 MG tablet    DELTASONE    60 tablet    Take 2  tablets (40 mg) by mouth daily for 5 days May repeat with gout episodes    Idiopathic chronic gout of foot without tophus, unspecified laterality       simvastatin 20 MG tablet    ZOCOR    90 tablet    Take 1 tablet (20 mg) by mouth At Bedtime    Hyperlipidemia LDL goal <100

## 2017-09-12 NOTE — NURSING NOTE
"Chief Complaint   Patient presents with     Hypertension     follow up     Hyperlipidemia     follow up     Arthritis     gout flare up- rt foot x1d       Initial BP (!) 160/92 (BP Location: Right arm, Patient Position: Chair, Cuff Size: Adult Regular)  Pulse 66  Temp 96.4  F (35.8  C) (Tympanic)  Resp 12  Wt 156 lb (70.8 kg)  SpO2 98%  BMI 27.9 kg/m2 Estimated body mass index is 27.9 kg/(m^2) as calculated from the following:    Height as of 1/17/17: 5' 2.7\" (1.593 m).    Weight as of this encounter: 156 lb (70.8 kg).  Medication Reconciliation: complete   Health Maintenance Due   Topic Date Due     INFLUENZA VACCINE (SYSTEM ASSIGNED)  09/01/2017     Norma Kim, [facility]      "

## 2017-09-12 NOTE — PROGRESS NOTES
Injectable Influenza Immunization Documentation    1.  Are you sick today? (Fever of 100.5 or higher on the day of the clinic)   No    2.  Have you ever had Guillain-Wayland Syndrome within 6 weeks of an influenza vaccionation?  No    3. Do you have a life-threatening allergy to eggs?  No    4. Do you have a life-threatening allergy to a component of the vaccine? May include antibiotics, gelatin or latex.  No     5. Have you ever had a reaction to a dose of flu vaccine that needed immediate medical attention?  No     Form completed by Nicole Connor CMA (AAMA)

## 2017-09-12 NOTE — PROGRESS NOTES
SUBJECTIVE:   Keyla Mari is a 77 year old female who presents to clinic today for the following health issues:      Hyperlipidemia Follow-Up      Rate your low fat/cholesterol diet?: good    Taking statin?  Yes, no muscle aches from statin    Other lipid medications/supplements?:  none    Hypertension Follow-up      Outpatient blood pressures are being checked at home.  Results are systolic 130-135, diastolic 70.    Low Salt Diet: low salt          Amount of exercise or physical activity: 6-7 days/week for an average of 30-45 minutes    Problems taking medications regularly: No    Medication side effects: a gout medication- indomethacin- gave her nausea  Diet: low salt          Problem list and histories reviewed & adjusted, as indicated.  Additional history: as documented    Still bothered with gout flares more frequent     Reviewed and updated as needed this visit by clinical staff     Reviewed and updated as needed this visit by Provider         ROS:  Constitutional, HEENT, cardiovascular, pulmonary, gi and gu systems are negative, except as otherwise noted.  Pain in big toes of both feet from gout     OBJECTIVE:   /78  Pulse 66  Temp 96.4  F (35.8  C) (Tympanic)  Resp 12  Wt 156 lb (70.8 kg)  SpO2 98%  BMI 27.9 kg/m2  Body mass index is 27.9 kg/(m^2).   GENERAL: healthy, alert and no distress  RESP: lungs clear to auscultation - no rales, rhonchi or wheezes  CV: regular rate and rhythm, normal S1 S2, no S3 or S4, no murmur, click or rub, no peripheral edema and peripheral pulses strong  MS: redness and swelling MP joint of R great toe     Diagnostic Test Results:  none     ASSESSMENT:       PLAN:   1. Idiopathic chronic gout of foot without tophus, unspecified laterality  Will start a daily control med daily I gave her prednisone which has worked great for acute flare ups   - allopurinol (ZYLOPRIM) 100 MG tablet; Take 1 tablet (100 mg) by mouth daily  Dispense: 90 tablet; Refill: 1  -  predniSONE (DELTASONE) 20 MG tablet; Take 2 tablets (40 mg) by mouth daily for 5 days May repeat with gout episodes  Dispense: 60 tablet; Refill: 0    2. Need for prophylactic vaccination and inoculation against influenza    - FLU VACCINE, INCREASED ANTIGEN, PRESV FREE, AGE 65+ [93833]  - Vaccine Administration, Initial [07165]      Weston Raines MD  [facility]

## 2017-09-12 NOTE — NURSING NOTE
Prior to injection verified patient identity using patient's name and date of birth.  Per orders of Dr. Raines, injection of influenza given by Nicole Connor. Patient instructed to remain in clinic for 15 minutes afterwards, and to report any adverse reaction to me immediately.

## 2017-10-09 ENCOUNTER — HOSPITAL ENCOUNTER (EMERGENCY)
Facility: CLINIC | Age: 77
Discharge: HOME | End: 2017-10-09
Attending: FAMILY MEDICINE | Admitting: FAMILY MEDICINE
Payer: MEDICARE

## 2017-10-09 ENCOUNTER — TELEPHONE (OUTPATIENT)
Dept: FAMILY MEDICINE | Facility: CLINIC | Age: 77
End: 2017-10-09

## 2017-10-09 ENCOUNTER — APPOINTMENT (OUTPATIENT)
Dept: CT IMAGING | Facility: CLINIC | Age: 77
End: 2017-10-09
Attending: FAMILY MEDICINE
Payer: MEDICARE

## 2017-10-09 VITALS
TEMPERATURE: 97.1 F | OXYGEN SATURATION: 97 % | DIASTOLIC BLOOD PRESSURE: 93 MMHG | WEIGHT: 153 LBS | SYSTOLIC BLOOD PRESSURE: 170 MMHG | HEIGHT: 64 IN | RESPIRATION RATE: 19 BRPM | HEART RATE: 61 BPM | BODY MASS INDEX: 26.12 KG/M2

## 2017-10-09 DIAGNOSIS — I10 HYPERTENSION GOAL BP (BLOOD PRESSURE) < 140/90: ICD-10-CM

## 2017-10-09 DIAGNOSIS — Z87.891 PERSONAL HISTORY OF TOBACCO USE, PRESENTING HAZARDS TO HEALTH: ICD-10-CM

## 2017-10-09 DIAGNOSIS — I10 HYPERTENSION, UNSPECIFIED TYPE: ICD-10-CM

## 2017-10-09 DIAGNOSIS — E78.5 HYPERLIPIDEMIA LDL GOAL <100: ICD-10-CM

## 2017-10-09 LAB
ALBUMIN SERPL-MCNC: 4.1 G/DL (ref 3.4–5)
ALP SERPL-CCNC: 97 U/L (ref 40–150)
ALT SERPL W P-5'-P-CCNC: 22 U/L (ref 0–50)
ANION GAP SERPL CALCULATED.3IONS-SCNC: 7 MMOL/L (ref 3–14)
AST SERPL W P-5'-P-CCNC: 16 U/L (ref 0–45)
BASOPHILS # BLD AUTO: 0.1 10E9/L (ref 0–0.2)
BASOPHILS NFR BLD AUTO: 0.7 %
BILIRUB SERPL-MCNC: 0.8 MG/DL (ref 0.2–1.3)
BUN SERPL-MCNC: 10 MG/DL (ref 7–30)
CALCIUM SERPL-MCNC: 9.4 MG/DL (ref 8.5–10.1)
CHLORIDE SERPL-SCNC: 104 MMOL/L (ref 94–109)
CO2 SERPL-SCNC: 27 MMOL/L (ref 20–32)
CREAT SERPL-MCNC: 0.93 MG/DL (ref 0.52–1.04)
DIFFERENTIAL METHOD BLD: ABNORMAL
EOSINOPHIL # BLD AUTO: 0.1 10E9/L (ref 0–0.7)
EOSINOPHIL NFR BLD AUTO: 1.3 %
ERYTHROCYTE [DISTWIDTH] IN BLOOD BY AUTOMATED COUNT: 13.7 % (ref 10–15)
GFR SERPL CREATININE-BSD FRML MDRD: 58 ML/MIN/1.7M2
GLUCOSE SERPL-MCNC: 120 MG/DL (ref 70–99)
HCT VFR BLD AUTO: 47 % (ref 35–47)
HGB BLD-MCNC: 15.4 G/DL (ref 11.7–15.7)
IMM GRANULOCYTES # BLD: 0 10E9/L (ref 0–0.4)
IMM GRANULOCYTES NFR BLD: 0.1 %
LYMPHOCYTES # BLD AUTO: 1.5 10E9/L (ref 0.8–5.3)
LYMPHOCYTES NFR BLD AUTO: 21.3 %
MCH RBC QN AUTO: 29.4 PG (ref 26.5–33)
MCHC RBC AUTO-ENTMCNC: 32.8 G/DL (ref 31.5–36.5)
MCV RBC AUTO: 90 FL (ref 78–100)
MONOCYTES # BLD AUTO: 0.6 10E9/L (ref 0–1.3)
MONOCYTES NFR BLD AUTO: 8.2 %
NEUTROPHILS # BLD AUTO: 4.7 10E9/L (ref 1.6–8.3)
NEUTROPHILS NFR BLD AUTO: 68.4 %
PLATELET # BLD AUTO: 211 10E9/L (ref 150–450)
POTASSIUM SERPL-SCNC: 4.1 MMOL/L (ref 3.4–5.3)
PROT SERPL-MCNC: 7.9 G/DL (ref 6.8–8.8)
RBC # BLD AUTO: 5.24 10E12/L (ref 3.8–5.2)
SODIUM SERPL-SCNC: 138 MMOL/L (ref 133–144)
TROPONIN I SERPL-MCNC: <0.015 UG/L (ref 0–0.04)
WBC # BLD AUTO: 6.9 10E9/L (ref 4–11)

## 2017-10-09 PROCEDURE — 70450 CT HEAD/BRAIN W/O DYE: CPT

## 2017-10-09 PROCEDURE — 85025 COMPLETE CBC W/AUTO DIFF WBC: CPT | Performed by: EMERGENCY MEDICINE

## 2017-10-09 PROCEDURE — 99285 EMERGENCY DEPT VISIT HI MDM: CPT | Mod: 25 | Performed by: FAMILY MEDICINE

## 2017-10-09 PROCEDURE — 80053 COMPREHEN METABOLIC PANEL: CPT | Performed by: EMERGENCY MEDICINE

## 2017-10-09 PROCEDURE — 25000132 ZZH RX MED GY IP 250 OP 250 PS 637: Performed by: EMERGENCY MEDICINE

## 2017-10-09 PROCEDURE — 93010 ELECTROCARDIOGRAM REPORT: CPT | Mod: Z6 | Performed by: FAMILY MEDICINE

## 2017-10-09 PROCEDURE — 84484 ASSAY OF TROPONIN QUANT: CPT | Performed by: EMERGENCY MEDICINE

## 2017-10-09 PROCEDURE — 93005 ELECTROCARDIOGRAM TRACING: CPT | Performed by: FAMILY MEDICINE

## 2017-10-09 RX ORDER — ASPIRIN 81 MG/1
324 TABLET, CHEWABLE ORAL ONCE
Status: COMPLETED | OUTPATIENT
Start: 2017-10-09 | End: 2017-10-09

## 2017-10-09 RX ORDER — AMLODIPINE BESYLATE 5 MG/1
5 TABLET ORAL DAILY
Qty: 30 TABLET | Refills: 0 | COMMUNITY
Start: 2017-10-09 | End: 2018-04-26

## 2017-10-09 RX ADMIN — ASPIRIN 81 MG 324 MG: 81 TABLET ORAL at 10:41

## 2017-10-09 ASSESSMENT — ENCOUNTER SYMPTOMS
VOMITING: 0
NUMBNESS: 1
HEADACHES: 1
NAUSEA: 0
NECK PAIN: 1
SHORTNESS OF BREATH: 0
WEAKNESS: 0
ARTHRALGIAS: 1

## 2017-10-09 NOTE — ED AVS SNAPSHOT
[facility] Emergency Department    [address] [place] [address]    Phone:  [phone]    Fax:  [phone]                                       Keyla Mari   MRN: [id number]    Department:  [facility] Emergency Department   Date of Visit:  10/9/2017           Patient Information     Date Of Birth          1940        Your diagnoses for this visit were:     Hypertension, unspecified type increased over baseline    Hypertension goal BP (blood pressure) < 140/90     Hyperlipidemia LDL goal <100        You were seen by Liang Tillman MD.      Follow-up Information     Follow up with Weston Raines MD.    Specialty:  Family Practice    Why:  1-2 weeks    Contact information:    [address] [place] [id number] [phone]          Discharge Instructions       Increase the Norvasc to one full tablet (5 mg) daily.  Recheck in clinic with Dr. Raines and 1-2 weeks.    Continue the rest of your current medications  Your lab work and CT scan were reassuring today.  Please return to the ED if you worsen or have any concerns.  It was nice visiting with both of you this morning.  I'm glad you are feeling better and hope you continue to improve.    Thank you for choosing [facility]. We appreciate the opportunity to meet your urgent medical needs. Please let us know if we could have done anything to make your stay more satisfying.    After discharge, please closely monitor for any new or worsening symptoms. Return to the Emergency Department if you develop any acute worsening signs or symptoms.    If you had lab work, cultures or imaging studies done during your stay, the final results may still be pending. We will call you if your plan of care needs to change. However, if you are not improving as expected, please follow up with your primary care provider or clinic.     Start any prescription medications that were prescribed to you and take them as  directed.     Please see additional handouts that may be pertinent to your condition.        24 Hour Appointment Hotline       To make an appointment at any [facility], call [phone] ([phone]). If you don't have a family doctor or clinic, we will help you find one. [facility] are conveniently located to serve the needs of you and your family.             Review of your medicines      CONTINUE these medicines which may have CHANGED, or have new prescriptions. If we are uncertain of the size of tablets/capsules you have at home, strength may be listed as something that might have changed.        Dose / Directions Last dose taken    amLODIPine 5 MG tablet   Commonly known as:  NORVASC   Dose:  5 mg   What changed:  Another medication with the same name was removed. Continue taking this medication, and follow the directions you see here.   Quantity:  30 tablet        Take 1 tablet (5 mg) by mouth daily   Refills:  0          Our records show that you are taking the medicines listed below. If these are incorrect, please call your family doctor or clinic.        Dose / Directions Last dose taken    aspirin 81 MG tablet   Dose:  81 mg        Take 1 tablet (81 mg) by mouth daily   Refills:  0        atenolol 100 MG tablet   Commonly known as:  TENORMIN   Quantity:  180 tablet        TAKE ONE TABLET BY MOUTH TWO TIMES A DAY   Refills:  1        losartan 100 MG tablet   Commonly known as:  COZAAR   Dose:  100 mg   Quantity:  90 tablet        Take 1 tablet (100 mg) by mouth daily   Refills:  1        simvastatin 20 MG tablet   Commonly known as:  ZOCOR   Dose:  20 mg   Quantity:  90 tablet        Take 1 tablet (20 mg) by mouth At Bedtime   Refills:  3                Procedures and tests performed during your visit     CBC with platelets differential    Cardiac Continuous Monitoring    Comprehensive metabolic panel    EKG 12-lead, tracing only    Head CT w/o contrast    Peripheral IV: Standard    Pulse  "oximetry nursing    Troponin I      Orders Needing Specimen Collection     None      Pending Results     No orders found from 10/7/2017 to 10/10/2017.            Pending Culture Results     No orders found from 10/7/2017 to 10/10/2017.            Pending Results Instructions     If you had any lab results that were not finalized at the time of your Discharge, you can call the ED Lab Result RN at [phone]. You will be contacted by this team for any positive Lab results or changes in treatment. The nurses are available 7 days a week from 10A to 6:30P.  You can leave a message 24 hours per day and they will return your call.        Thank you for choosing [facility]       Thank you for choosing [facility] for your care. Our goal is always to provide you with excellent care. Hearing back from our patients is one way we can continue to improve our services. Please take a few minutes to complete the written survey that you may receive in the mail after you visit with us. Thank you!        [employer] Information     [employer] lets you send messages to your doctor, view your test results, renew your prescriptions, schedule appointments and more. To sign up, go to [email or web address] . Click on \"Log in\" on the left side of the screen, which will take you to the Welcome page. Then click on \"Sign up Now\" on the right side of the page.     You will be asked to enter the access code listed below, as well as some personal information. Please follow the directions to create your username and password.     Your access code is: [email or web address]  Expires: 2017  4:59 PM     Your access code will  in 90 days. If you need help or a new code, please call your [facility] clinic or [phone].        Care EveryWhere ID     This is your Care EveryWhere ID. This could be used by other organizations to access your [facility] medical records  [id number]        Equal Access to Services     KATHY MILES: Preethi Mcbride, " arely mancia, jannet ortega. So [facility] [phone].    ATENCIÓN: Si habla español, tiene a singh disposición servicios gratuitos de asistencia lingüística. Llame al [phone].    We comply with applicable federal civil rights laws and [place] laws. We do not discriminate on the basis of race, color, national origin, age, disability, sex, sexual orientation, or gender identity.            After Visit Summary       This is your record. Keep this with you and show to your community pharmacist(s) and doctor(s) at your next visit.

## 2017-10-09 NOTE — ED NOTES
Patient presents with daughter with concerns for elevated BP's since Saturday. She reports headache, R) arm pain since waking this morning at 0600. Mitra Moreno RN

## 2017-10-09 NOTE — ED PROVIDER NOTES
History     Chief Complaint   Patient presents with     Hypertension     The history is provided by the patient and a relative.     Keyla Mari is a 77 year old female who presents for hypertension. She expresses that her blood pressure has been off lately. On Saturday, she experienced pain in the back of her neck and arms, and a headache. She is having numbness in her right leg, but it disappeared. This morning she woke up with high blood pressure at 195/94 that did not go down. High blood pressure has been an ongoing issue for her. She is taking Tylenol, Lovastatin, and Amlodipine. Recently, her primary care provider Dr. Flores discontinued hydrochlorothiazide due to her gout. She is taking Allopurinol to treat her gout. Patient also tried a few tablespoons of cherry juice with a glass of water each day. She expresses that her balance is not good. She denies weakness, chest pain, trouble breathing, nausea, and vomiting. She has tingling in her sensation in her arms. Patient is right handed.     Patient Active Problem List   Diagnosis     Transient cerebral ischemia     Displacement of lumbar intervertebral disc without myelopathy     Gout     HYPERLIPIDEMIA LDL GOAL <100     Lumbar radiculopathy     CKD (chronic kidney disease) stage 3, GFR 30-59 ml/min     Proximal humerus fracture     Advanced directives, counseling/discussion     Sleep disorder     Cerebral infarction (H)     Transient cerebral ischemia     Absence of menstruation     HTN (hypertension)     Mandibular soft tissue mass     Lung mass, spiculated in the right upper lobe, 1.1 x 2.2 x 0.6 cm.     Nodule of right lung     Hypertension goal BP (blood pressure) < 150/90     Past Medical History:   Diagnosis Date     Acute, but ill-defined, cerebrovascular disease      Closed fracture of unspecified part of neck of femur     Hip fracture     CVA (cerebral infarction) 2005     Elevated blood pressure reading without diagnosis of hypertension       Hemorrhage of gastrointestinal tract, unspecified 08/13/2007    Transfer to [facility]     History of blood transfusion     after colonoscopy     HTN (hypertension)      Hyperlipidaemia      Other and unspecified hyperlipidemia      PONV (postoperative nausea and vomiting)      Unspecified cerebral artery occlusion with cerebral infarction        Past Surgical History:   Procedure Laterality Date     COLONOSCOPY  08/13/07      COLONOSCOPY W BIOPSY  07/01/07      DILATION/CURETTAGE DIAG/THER NON OB  1984    D & C     LOBECTOMY LUNG Right 6/5/2015    Procedure: LOBECTOMY LUNG;  Surgeon: Yariel Lund MD;  Location:  OR     SURGICAL HISTORY OF -       Left leg fx--had pinning     THORACOTOMY Right 6/5/2015    Procedure: THORACOTOMY;  Surgeon: Yariel Lund MD;  Location: [facility] OR       Family History   Problem Relation Age of Onset     Hypertension Father      HEART DISEASE Father      Fatal MI at 64     DIABETES Mother      Fatal complication of DM at 70     Hypertension Mother      CANCER Maternal Aunt      Stomach cancer x3     GASTROINTESTINAL DISEASE Maternal Uncle      Bleeding ulcer - fatal     DIABETES Paternal Grandfather      HEART DISEASE Paternal Uncle      MI     HEART DISEASE Maternal Uncle      MI - fatal     CANCER Paternal Aunt      Breast     Circulatory Paternal Aunt      Aortic aneurysm     Breast Cancer Daughter 54       Social History   Substance Use Topics     Smoking status: Former Smoker     Quit date: 3/16/1985     Smokeless tobacco: Never Used     Alcohol use No        Immunization History   Administered Date(s) Administered     Influenza (High Dose) 3 valent vaccine 10/11/2010, 10/10/2012, 10/14/2013, 10/16/2014, 10/22/2015, 10/25/2016, 09/12/2017     Influenza (IIV3) 10/15/2007     Pneumococcal (PCV 13) 08/07/2017     Pneumococcal 23 valent 10/15/2007     TD (ADULT, 7+) 01/26/2009          Allergies   Allergen Reactions     Indomethacin GI Disturbance  "      Current Outpatient Prescriptions   Medication Sig Dispense Refill     amLODIPine (NORVASC) 5 MG tablet Take 1 tablet (5 mg) by mouth daily 30 tablet 0     simvastatin (ZOCOR) 20 MG tablet Take 1 tablet (20 mg) by mouth At Bedtime 90 tablet 3     losartan (COZAAR) 100 MG tablet Take 1 tablet (100 mg) by mouth daily 90 tablet 1     atenolol (TENORMIN) 100 MG tablet TAKE ONE TABLET BY MOUTH TWO TIMES A  tablet 1     [DISCONTINUED] amLODIPine (NORVASC) 5 MG tablet TAKE ONE-HALF TABLET BY MOUTH EVERY DAY 45 tablet 2     aspirin 81 MG tablet Take 1 tablet (81 mg) by mouth daily       [DISCONTINUED] doxazosin (CARDURA) 2 MG tablet Take 1 tablet (2 mg) by mouth At Bedtime One pill at bedtime. 14 tablet 0         I have reviewed the Medications, Allergies, Past Medical and Surgical History, and Social History in the Epic system.      Review of Systems   Respiratory: Negative for shortness of breath.    Cardiovascular: Negative for chest pain.        POSITIVE hypertension     Gastrointestinal: Negative for nausea and vomiting.   Musculoskeletal: Positive for arthralgias (back of arms) and neck pain.   Skin:        POSITIVE gout     Neurological: Positive for numbness (right leg) and headaches. Negative for weakness.        POSITIVE balance changes  POSITIVE tingling sensation     All other systems reviewed and are negative.      Physical Exam   BP: (!) 192/103  Pulse: 61  Temp: 97.1  F (36.2  C)  Resp: 16  Height: 162.6 cm (5' 4\")  Weight: 69.4 kg (153 lb)  SpO2: 98 %  Physical Exam   Constitutional: She is oriented to person, place, and time. She appears well-developed and well-nourished. No distress.   HENT:   Mouth/Throat: Oropharynx is clear and moist.   Eyes: EOM are normal. Pupils are equal, round, and reactive to light.   Neck: Neck supple.   Cardiovascular: Normal rate, regular rhythm, normal heart sounds and intact distal pulses.    Pulmonary/Chest: Effort normal and breath sounds normal. No respiratory " distress.   Abdominal: Soft. Bowel sounds are normal. There is no tenderness.   Musculoskeletal: Normal range of motion. She exhibits no edema or tenderness.   Neurological: She is alert and oriented to person, place, and time. She has normal strength. No cranial nerve deficit or sensory deficit. Coordination and gait normal. GCS eye subscore is 4. GCS verbal subscore is 5. GCS motor subscore is 6.   Skin: Skin is warm and dry.   Psychiatric: She has a normal mood and affect.   Nursing note and vitals reviewed.      ED Course    EKG showed no acute ischemic changes.  Troponin was undetectable.  We did do a head CT because of the headache and pain in the back of her neck and numbness in her arms.  That showed no evidence of acute stroke.  She's had the symptoms for at least 2 days I would've expected something to show up.  She's had no weakness.    Her blood pressure did trend downward during her ED visit without any specific intervention.  She is beta blocked as much as she can be with a heart rate around 60.  She is only on 2.5 mg of amlodipine daily so we will bump that up to 5 mg and have her recheck in clinic in the next week or so.  She should have adequate medications to last until then.  She is overall feeling better and wishes to go home.  After trending downward during her ED stay, her discharge blood pressure was back up again.  We will continue with the plan as outlined above.           ED Course     Procedures             EKG Interpretation:      Interpreted by Liang Tillman  Time reviewed: 0955  Symptoms at time of EKG: elevated BP   Rhythm: Sinus bradycardia with rate variation   Rate: 56 bpm  Axis: normal  Ectopy: none  Conduction: normal  ST Segments/ T Waves: No ST-T wave changes  Q Waves: none  Comparison to prior: Unchanged from 06/05/2015    Clinical Impression: No acute ischemic changes.            Critical Care time:  none             Results for orders placed or performed during the  hospital encounter of 10/09/17 (from the past 24 hour(s))   CBC with platelets differential   Result Value Ref Range    WBC 6.9 4.0 - 11.0 10e9/L    RBC Count 5.24 (H) 3.8 - 5.2 10e12/L    Hemoglobin 15.4 11.7 - 15.7 g/dL    Hematocrit 47.0 35.0 - 47.0 %    MCV 90 78 - 100 fl    MCH 29.4 26.5 - 33.0 pg    MCHC 32.8 31.5 - 36.5 g/dL    RDW 13.7 10.0 - 15.0 %    Platelet Count 211 150 - 450 10e9/L    Diff Method Automated Method     % Neutrophils 68.4 %    % Lymphocytes 21.3 %    % Monocytes 8.2 %    % Eosinophils 1.3 %    % Basophils 0.7 %    % Immature Granulocytes 0.1 %    Absolute Neutrophil 4.7 1.6 - 8.3 10e9/L    Absolute Lymphocytes 1.5 0.8 - 5.3 10e9/L    Absolute Monocytes 0.6 0.0 - 1.3 10e9/L    Absolute Eosinophils 0.1 0.0 - 0.7 10e9/L    Absolute Basophils 0.1 0.0 - 0.2 10e9/L    Abs Immature Granulocytes 0.0 0 - 0.4 10e9/L   Troponin I   Result Value Ref Range    Troponin I ES <0.015 0.000 - 0.045 ug/L   Comprehensive metabolic panel   Result Value Ref Range    Sodium 138 133 - 144 mmol/L    Potassium 4.1 3.4 - 5.3 mmol/L    Chloride 104 94 - 109 mmol/L    Carbon Dioxide 27 20 - 32 mmol/L    Anion Gap 7 3 - 14 mmol/L    Glucose 120 (H) 70 - 99 mg/dL    Urea Nitrogen 10 7 - 30 mg/dL    Creatinine 0.93 0.52 - 1.04 mg/dL    GFR Estimate 58 (L) >60 mL/min/1.7m2    GFR Estimate If Black 70 >60 mL/min/1.7m2    Calcium 9.4 8.5 - 10.1 mg/dL    Bilirubin Total 0.8 0.2 - 1.3 mg/dL    Albumin 4.1 3.4 - 5.0 g/dL    Protein Total 7.9 6.8 - 8.8 g/dL    Alkaline Phosphatase 97 40 - 150 U/L    ALT 22 0 - 50 U/L    AST 16 0 - 45 U/L   Head CT w/o contrast    Narrative    CT SCAN OF THE HEAD WITHOUT CONTRAST   10/9/2017 11:08 AM     HISTORY: Elevated blood pressure, right arm numbness, tingling for 2  days.    TECHNIQUE:  Axial images of the head and coronal reformations without  IV contrast material. Radiation dose for this scan was reduced using  automated exposure control, adjustment of the mA and/or kV according  to  patient size, or iterative reconstruction technique.    COMPARISON: 7/19/2014.    FINDINGS:  There is generalized atrophy of the brain.  There is low  attenuation in the white matter of the cerebral hemispheres consistent  with sequelae of small vessel ischemic disease. There is no evidence  of intracranial hemorrhage, mass, acute infarct or anomaly.     The visualized portions of the sinuses and mastoids appear normal.  There is no evidence of trauma.      Impression    IMPRESSION:   1.  No acute abnormality.  2.  Atrophy of the brain.  White matter changes consistent with  sequelae of small vessel ischemic disease. This is unchanged.     TURNER TORRES MD       Medications   aspirin chewable tablet 324 mg (324 mg Oral Given 10/9/17 1041)       Assessments & Plan (with Medical Decision Making)    (I10) Hypertension, unspecified type  Comment: increased over baseline  Plan: Increase Norvasc from 2.5 mg up to 5 mg daily.  Recheck in clinic in 1-2 weeks.          I have reviewed the nursing notes.    I have reviewed the findings, diagnosis, plan and need for follow up with the patient.       Discharge Medication List as of 10/9/2017 11:52 AM          Final diagnoses:   Hypertension, unspecified type - increased over baseline     This document serves as a record of services personally performed by Liang Tillman MD. It was created on their behalf by Marzena Black, a trained medical scribe. The creation of this record is based on the provider's personal observations and the statements of the patient. This document has been checked and approved by the attending provider.    Note: Chart documentation done in part with Dragon Voice Recognition software. Although reviewed after completion, some word and grammatical errors may remain.    10/9/2017   [facility] EMERGENCY DEPARTMENT     Liang Tillman MD  10/09/17 8003

## 2017-10-09 NOTE — DISCHARGE INSTRUCTIONS
Increase the Norvasc to one full tablet (5 mg) daily.  Recheck in clinic with Dr. Raines and 1-2 weeks.    Continue the rest of your current medications  Your lab work and CT scan were reassuring today.  Please return to the ED if you worsen or have any concerns.  It was nice visiting with both of you this morning.  I'm glad you are feeling better and hope you continue to improve.    Thank you for choosing [facility]. We appreciate the opportunity to meet your urgent medical needs. Please let us know if we could have done anything to make your stay more satisfying.    After discharge, please closely monitor for any new or worsening symptoms. Return to the Emergency Department if you develop any acute worsening signs or symptoms.    If you had lab work, cultures or imaging studies done during your stay, the final results may still be pending. We will call you if your plan of care needs to change. However, if you are not improving as expected, please follow up with your primary care provider or clinic.     Start any prescription medications that were prescribed to you and take them as directed.     Please see additional handouts that may be pertinent to your condition.

## 2017-10-09 NOTE — TELEPHONE ENCOUNTER
Reason for Call:  Same Day Appointment, Requested Provider:  Weston Raines M.D.    PCP: Weston Raines    Reason for visit: ED follow up in 1-2 wks    Duration of symptoms:     Have you been treated for this in the past? Yes    Additional comments:     Can we leave a detailed message on this number? YES    Phone number patient can be reached at:     Best Time:     Call taken on 10/9/2017 at 3:28 PM by Sana Ruelas

## 2017-10-09 NOTE — ED AVS SNAPSHOT
[facility] Emergency Department    [address] [place] [address]    Phone:  [phone]    Fax:  [phone]                                       Keyla Mari   MRN: [id number]    Department:  [facility] Emergency Department   Date of Visit:  10/9/2017           After Visit Summary Signature Page     I have received my discharge instructions, and my questions have been answered. I have discussed any challenges I see with this plan with the nurse or doctor.    ..........................................................................................................................................  Patient/Patient Representative Signature      ..........................................................................................................................................  Patient Representative Print Name and Relationship to Patient    ..................................................               ................................................  Date                                            Time    ..........................................................................................................................................  Reviewed by Signature/Title    ...................................................              ..............................................  Date                                                            Time

## 2017-10-10 NOTE — TELEPHONE ENCOUNTER
Per RF- have her come next week.    Called pt and scheduled her for 10/19/17.  Norma Kim, [facility]

## 2017-10-19 ENCOUNTER — OFFICE VISIT (OUTPATIENT)
Dept: FAMILY MEDICINE | Facility: CLINIC | Age: 77
End: 2017-10-19
Payer: COMMERCIAL

## 2017-10-19 VITALS
HEIGHT: 64 IN | DIASTOLIC BLOOD PRESSURE: 78 MMHG | HEART RATE: 48 BPM | TEMPERATURE: 97 F | BODY MASS INDEX: 26.12 KG/M2 | SYSTOLIC BLOOD PRESSURE: 162 MMHG | RESPIRATION RATE: 16 BRPM | OXYGEN SATURATION: 97 % | WEIGHT: 153 LBS

## 2017-10-19 DIAGNOSIS — R00.1 BRADYCARDIA: Primary | ICD-10-CM

## 2017-10-19 DIAGNOSIS — N81.11 CYSTOCELE, MIDLINE: ICD-10-CM

## 2017-10-19 PROCEDURE — 99214 OFFICE O/P EST MOD 30 MIN: CPT | Performed by: FAMILY MEDICINE

## 2017-10-19 PROCEDURE — 93000 ELECTROCARDIOGRAM COMPLETE: CPT | Performed by: FAMILY MEDICINE

## 2017-10-19 NOTE — MR AVS SNAPSHOT
"              After Visit Summary   10/19/2017    Keyla Mari    MRN: [id number]           Patient Information     Date Of Birth          1940        Visit Information        Provider Department      10/19/2017 11:40 AM Weston Raines MD [facility]        Today's Diagnoses     Bradycardia    -  1       Follow-ups after your visit        Your next 10 appointments already scheduled     Nov 03, 2017 10:20 AM CDT   Office Visit with Roshan Stockton MD   [facility]    [address] [place] [id number] [phone]           Bring a current list of meds and any records pertaining to this visit. For Physicals, please bring immunization records and any forms needing to be filled out. Please arrive 10 minutes early to complete paperwork.              Who to contact     If you have questions or need follow up information about today's clinic visit or your schedule please contact [facility] directly at [phone].  Normal or non-critical lab and imaging results will be communicated to you by [employer], letter or phone within 4 business days after the clinic has received the results. If you do not hear from us within 7 days, please contact the clinic through [employer] or phone. If you have a critical or abnormal lab result, we will notify you by phone as soon as possible.  Submit refill requests through [employer] or call your pharmacy and they will forward the refill request to us. Please allow 3 business days for your refill to be completed.          Additional Information About Your Visit        [employer] Information     [employer] lets you send messages to your doctor, view your test results, renew your prescriptions, schedule appointments and more. To sign up, go to [email or web address] . Click on \"Log in\" on the left side of the screen, which will take you to the Welcome page. Then click on \"Sign up Now\" on the right " "side of the page.     You will be asked to enter the access code listed below, as well as some personal information. Please follow the directions to create your username and password.     Your access code is: [email or web address]  Expires: 2017  4:59 PM     Your access code will  in 90 days. If you need help or a new code, please call your [facility] or [phone].        Care EveryWhere ID     This is your Care EveryWhere ID. This could be used by other organizations to access your [facility] medical records  [id number]        Your Vitals Were     Pulse Temperature Respirations Height Pulse Oximetry BMI (Body Mass Index)    48 97  F (36.1  C) (Temporal) 16 5' 4\" (1.626 m) 97% 26.26 kg/m2       Blood Pressure from Last 3 Encounters:   10/19/17 162/78   10/09/17 (!) 170/93   17 138/78    Weight from Last 3 Encounters:   10/19/17 153 lb (69.4 kg)   10/09/17 153 lb (69.4 kg)   17 156 lb (70.8 kg)              We Performed the Following     EKG 12-lead complete w/read - Clinics        Primary Care Provider Office Phone # Fax #    Weston Raines [phone] [address] [place] [address]        Equal Access to Services     KATHY BARRETT : Hadii trinh alicea hadasho Soomaali, waaxda luqadaha, qaybta kaalmada silvia, jannet sargent. So [facility] [phone].    ATENCIÓN: Si habla español, tiene a singh disposición servicios gratuitos de asistencia lingüística. Leena al [phone].    We comply with applicable federal civil rights laws and [place] laws. We do not discriminate on the basis of race, color, national origin, age, disability, sex, sexual orientation, or gender identity.            Thank you!     Thank you for choosing [facility]  for your care. Our goal is always to provide you with excellent care. Hearing back from our patients is one way we can continue to improve our services. Please take a few minutes to complete the " written survey that you may receive in the mail after your visit with us. Thank you!             Your Updated Medication List - Protect others around you: Learn how to safely use, store and throw away your medicines at www.disposemymeds.org.          This list is accurate as of: 10/19/17 12:21 PM.  Always use your most recent med list.                   Brand Name Dispense Instructions for use Diagnosis    amLODIPine 5 MG tablet    NORVASC    30 tablet    Take 1 tablet (5 mg) by mouth daily        aspirin 81 MG tablet      Take 1 tablet (81 mg) by mouth daily        atenolol 100 MG tablet    TENORMIN    180 tablet    TAKE ONE TABLET BY MOUTH TWO TIMES A DAY    Hypertension goal BP (blood pressure) < 140/90       losartan 100 MG tablet    COZAAR    90 tablet    Take 1 tablet (100 mg) by mouth daily    Hypertension goal BP (blood pressure) < 150/90       simvastatin 20 MG tablet    ZOCOR    90 tablet    Take 1 tablet (20 mg) by mouth At Bedtime    Hyperlipidemia LDL goal <100

## 2017-10-19 NOTE — NURSING NOTE
"Chief Complaint   Patient presents with     Hospital F/U     HTN - amlodipine was increased to 5 mg from 2.5        Initial /78 (BP Location: Right arm, Patient Position: Chair, Cuff Size: Adult Regular)  Pulse (!) 48  Temp 97  F (36.1  C) (Temporal)  Resp 16  Ht 5' 4\" (1.626 m)  Wt 153 lb (69.4 kg)  SpO2 97%  BMI 26.26 kg/m2 Estimated body mass index is 26.26 kg/(m^2) as calculated from the following:    Height as of this encounter: 5' 4\" (1.626 m).    Weight as of this encounter: 153 lb (69.4 kg)..    BP completed using cuff size: regular    Brandee Cr CMA  "

## 2017-10-19 NOTE — PROGRESS NOTES
Subjective:  She is here today for follow-up of hypertension she was in the emergency room and they increased her Norvasc from 2.5 mg 10 mg. She has felt fatigued since they did that. No chest pain or shortness of breath. She does have one other problem today she states that she feels that something may be falling out of her vagina she's noticed this over the past many months but was too embarrassed to bring it to my attention. No vaginal bleeding. She still has her uterus in place. No other complaints    Objective:  Lungs: Clear to Auscultation    Heart: S1-S2 without murmur no  EKG:  Patient has sinus bradycardia with a sinus arrhythmia no acute ST-T wave changes.        Pelvic:  Patient has no evidence of uterine prolapse but she does have a cystocele.    Assessment:  Bradycardia.  Cystocele    Plan:  Patient will cut back on her amlodipine to 2.5 mg q. day. Will get her into see internal medicine for a second opinion on her blood pressure control as I don't want to get too low and I don't want her to have a significant bradycardia where she might fall and break her hip. I will have her see Dr. Geller for her vaginal complaints.       Weston Raines MD

## 2017-11-03 ENCOUNTER — OFFICE VISIT (OUTPATIENT)
Dept: FAMILY MEDICINE | Facility: CLINIC | Age: 77
End: 2017-11-03
Payer: COMMERCIAL

## 2017-11-03 VITALS
TEMPERATURE: 98 F | SYSTOLIC BLOOD PRESSURE: 180 MMHG | RESPIRATION RATE: 16 BRPM | BODY MASS INDEX: 26.09 KG/M2 | DIASTOLIC BLOOD PRESSURE: 86 MMHG | WEIGHT: 152 LBS | HEART RATE: 56 BPM | OXYGEN SATURATION: 98 %

## 2017-11-03 DIAGNOSIS — N81.11 CYSTOCELE, MIDLINE: Primary | ICD-10-CM

## 2017-11-03 DIAGNOSIS — N81.2 SECOND DEGREE UTERINE PROLAPSE: ICD-10-CM

## 2017-11-03 PROCEDURE — 99214 OFFICE O/P EST MOD 30 MIN: CPT | Performed by: OBSTETRICS & GYNECOLOGY

## 2017-11-03 ASSESSMENT — PAIN SCALES - GENERAL: PAINLEVEL: NO PAIN (0)

## 2017-11-03 NOTE — PROGRESS NOTES
SUBJECTIVE:                                                      Referral from Weston Raines       Reason for consultation:  cystocele    History:  Keyla  Is a 77 year old female  5 para [id number]( 5 SVDs )   who presents today because of cystocele- feels as if her bladder is dropping inside her for the past several years. No difficulty with BMs or urination. No WALLACE.  No vaginal bleeding.  She smoked for 15 years- quit 30 years ago.      Patient Active Problem List   Diagnosis     Transient cerebral ischemia     Displacement of lumbar intervertebral disc without myelopathy     Gout     HYPERLIPIDEMIA LDL GOAL <100     Lumbar radiculopathy     CKD (chronic kidney disease) stage 3, GFR 30-59 ml/min     Proximal humerus fracture     Advanced directives, counseling/discussion     Sleep disorder     Cerebral infarction (H)     Transient cerebral ischemia     Absence of menstruation     HTN (hypertension)     Mandibular soft tissue mass     Lung mass, spiculated in the right upper lobe, 1.1 x 2.2 x 0.6 cm.     Nodule of right lung     Hypertension goal BP (blood pressure) < 150/90     Past Surgical History:   Procedure Laterality Date     COLONOSCOPY  07     HC COLONOSCOPY W BIOPSY  07      DILATION/CURETTAGE DIAG/THER NON OB  1984    D & C     LOBECTOMY LUNG Right 2015    Procedure: LOBECTOMY LUNG;  Surgeon: Yariel Lund MD;  Location:  OR     SURGICAL HISTORY OF -       Left leg fx--had pinning     THORACOTOMY Right 2015    Procedure: THORACOTOMY;  Surgeon: Yariel Lund MD;  Location:  OR       Social History   Substance Use Topics     Smoking status: Former Smoker     Quit date: 3/16/1985     Smokeless tobacco: Never Used     Alcohol use No     Family History   Problem Relation Age of Onset     Hypertension Father      HEART DISEASE Father      Fatal MI at 64     DIABETES Mother      Fatal complication of DM at 70     Hypertension Mother      CANCER Maternal  Aunt      Stomach cancer x3     GASTROINTESTINAL DISEASE Maternal Uncle      Bleeding ulcer - fatal     DIABETES Paternal Grandfather      HEART DISEASE Paternal Uncle      MI     HEART DISEASE Maternal Uncle      MI - fatal     CANCER Paternal Aunt      Breast     Circulatory Paternal Aunt      Aortic aneurysm     Breast Cancer Daughter 54         Current Outpatient Prescriptions   Medication Sig Dispense Refill     amLODIPine (NORVASC) 5 MG tablet Take 1 tablet (5 mg) by mouth daily 30 tablet 0     simvastatin (ZOCOR) 20 MG tablet Take 1 tablet (20 mg) by mouth At Bedtime 90 tablet 3     losartan (COZAAR) 100 MG tablet Take 1 tablet (100 mg) by mouth daily 90 tablet 1     atenolol (TENORMIN) 100 MG tablet TAKE ONE TABLET BY MOUTH TWO TIMES A  tablet 1     aspirin 81 MG tablet Take 1 tablet (81 mg) by mouth daily       [DISCONTINUED] doxazosin (CARDURA) 2 MG tablet Take 1 tablet (2 mg) by mouth At Bedtime One pill at bedtime. 14 tablet 0       ROS:  A 12 point systems review is negative except for what is listed above in the Subjective history.            OBJECTIVE:                                                    Vital signs: Blood pressure 180/86, pulse 56, temperature 98  F (36.7  C), temperature source Temporal, resp. rate 16, weight 152 lb (68.9 kg), SpO2 98 %, not currently breastfeeding.      Neck is supple, mobile, no adenoapthy or masses palpable. Normal range of motion noted.     Chest is clear to auscultation. No wheezes, rales or rhonchi heard.  cardiac exam is normal with s1, s2, no murmurs or adventitious sounds.Normal rate and rhythm is heard.     Abdomen is soft,  nondistended, No masses felt.No HSM. No guarding or rigidity or rebound noted. Palpation reveals  no    tenderness   Normal bowel sounds heard.     Pelvic exam:My nurse Rae   was present to chaperone the exam.    The external genitalia appeared normal.  -atrophic but no other issues noted-    The vaginal vault was without  bleeding  or   discharge or odor.      The cervix was smooth and shiny and normal in appearance.    She has a grade 2 uterine prolapse and grade 3 cystocele  The vaginal vault looks normal othersie    Bimanual exam revealed a 5 week sized uterus. Grade 2 prolapse     No adnexal masses were felt.      There was no  cervical motion tenderness.      Exam was slightly  limited by the patient's body habitus.             ASSESSMENT/PLAN:                                                        1.77 year old female  with grade 3 cystocele and grade 2 uterine prolapse who is relatively asymptomatic- I offered her a pessary but she declined. I dont advise surgery because she is not having difficulty voiding or holding her urine and no problem with BMs. She also has risks with surgery due to her comorbid conditions. For now I would advise conservative management and if symptoms develop, consider pessary.                                                                      Thank you for this consultation.    Copy to  Weston Raines MD  [facility]

## 2017-11-03 NOTE — MR AVS SNAPSHOT
"              After Visit Summary   11/3/2017    Keyla Mari    MRN: [id number]           Patient Information     Date Of Birth          1940        Visit Information        Provider Department      11/3/2017 10:20 AM Roshan Stockton MD [facility]        Today's Diagnoses     Cystocele, midline    -  1    Second degree uterine prolapse           Follow-ups after your visit        Who to contact     If you have questions or need follow up information about today's clinic visit or your schedule please contact [facility] directly at [phone].  Normal or non-critical lab and imaging results will be communicated to you by [employer], letter or phone within 4 business days after the clinic has received the results. If you do not hear from us within 7 days, please contact the clinic through [employer] or phone. If you have a critical or abnormal lab result, we will notify you by phone as soon as possible.  Submit refill requests through [employer] or call your pharmacy and they will forward the refill request to us. Please allow 3 business days for your refill to be completed.          Additional Information About Your Visit        MyChart Information     [employer] lets you send messages to your doctor, view your test results, renew your prescriptions, schedule appointments and more. To sign up, go to [email or web address] . Click on \"Log in\" on the left side of the screen, which will take you to the Welcome page. Then click on \"Sign up Now\" on the right side of the page.     You will be asked to enter the access code listed below, as well as some personal information. Please follow the directions to create your username and password.     Your access code is: [email or web address]  Expires: 2017  4:59 PM     Your access code will  in 90 days. If you need help or a new code, please call your [facility] or [phone].        Care EveryWhere ID     This is your Care " EveryWhere ID. This could be used by other organizations to access your [facility] medical records  [id number]        Your Vitals Were     Pulse Temperature Respirations Pulse Oximetry Breastfeeding? BMI (Body Mass Index)    56 98  F (36.7  C) (Temporal) 16 98% No 26.09 kg/m2       Blood Pressure from Last 3 Encounters:   11/03/17 180/86   10/19/17 162/78   10/09/17 (!) 170/93    Weight from Last 3 Encounters:   11/03/17 152 lb (68.9 kg)   10/19/17 153 lb (69.4 kg)   10/09/17 153 lb (69.4 kg)              Today, you had the following     No orders found for display       Primary Care Provider Office Phone # Fax #    Weston Raines [phone] [address] [place] [address]        Equal Access to Services     [facility] Center[facility]AYLIE : Hadii trinh alicea hadasho Soomaali, waaxda luqadaha, qaybta kaalmada adeegyada, jannet milligan . So [facility] [phone].    ATENCIÓN: Si habla español, tiene a singh disposición servicios gratuitos de asistencia lingüística. Llame al [phone].    We comply with applicable federal civil rights laws and [place] laws. We do not discriminate on the basis of race, color, national origin, age, disability, sex, sexual orientation, or gender identity.            Thank you!     Thank you for choosing [facility]  for your care. Our goal is always to provide you with excellent care. Hearing back from our patients is one way we can continue to improve our services. Please take a few minutes to complete the written survey that you may receive in the mail after your visit with us. Thank you!             Your Updated Medication List - Protect others around you: Learn how to safely use, store and throw away your medicines at www.disposemymeds.org.          This list is accurate as of: 11/3/17 11:26 AM.  Always use your most recent med list.                   Brand Name Dispense Instructions for use Diagnosis    amLODIPine 5 MG tablet    NORVASC     30 tablet    Take 1 tablet (5 mg) by mouth daily        aspirin 81 MG tablet      Take 1 tablet (81 mg) by mouth daily        atenolol 100 MG tablet    TENORMIN    180 tablet    TAKE ONE TABLET BY MOUTH TWO TIMES A DAY    Hypertension goal BP (blood pressure) < 140/90       losartan 100 MG tablet    COZAAR    90 tablet    Take 1 tablet (100 mg) by mouth daily    Hypertension goal BP (blood pressure) < 150/90       simvastatin 20 MG tablet    ZOCOR    90 tablet    Take 1 tablet (20 mg) by mouth At Bedtime    Hyperlipidemia LDL goal <100

## 2017-11-03 NOTE — NURSING NOTE
"Chief Complaint   Patient presents with     Consult       Initial /86 (Cuff Size: Adult Regular)  Pulse 56  Temp 98  F (36.7  C) (Temporal)  Resp 16  Wt 152 lb (68.9 kg)  SpO2 98%  Breastfeeding? No  BMI 26.09 kg/m2 Estimated body mass index is 26.09 kg/(m^2) as calculated from the following:    Height as of 10/19/17: 5' 4\" (1.626 m).    Weight as of this encounter: 152 lb (68.9 kg).  Medication Reconciliation: complete   Elijah Dominguez MA      "

## 2017-11-07 DIAGNOSIS — I10 HYPERTENSION GOAL BP (BLOOD PRESSURE) < 140/90: ICD-10-CM

## 2017-11-08 NOTE — TELEPHONE ENCOUNTER
Requested Prescriptions   Pending Prescriptions Disp Refills     atenolol (TENORMIN) 100 MG tablet [Pharmacy Med Name: ATENOLOL 100MG TABS] 180 tablet 1     Sig: TAKE ONE TABLET BY MOUTH TWICE A DAY    Beta-Blockers Protocol Failed    11/7/2017  1:07 PM       Failed - Blood pressure under 140/90    BP Readings from Last 3 Encounters:   11/03/17 180/86   10/19/17 162/78   10/09/17 (!) 170/93                Passed - Patient is age 6 or older       Passed - Recent or future visit with authorizing provider's specialty    Patient had office visit in the last year or has a visit in the next 30 days with authorizing provider.  See chart review.               Routing refill request to provider for review/approval because:  Blood pressures above goal.     Barbi Madden RN

## 2017-11-09 RX ORDER — ATENOLOL 100 MG/1
TABLET ORAL
Qty: 180 TABLET | Refills: 1 | Status: SHIPPED | OUTPATIENT
Start: 2017-11-09 | End: 2018-04-26

## 2018-01-29 DIAGNOSIS — I10 HYPERTENSION GOAL BP (BLOOD PRESSURE) < 150/90: ICD-10-CM

## 2018-01-29 NOTE — TELEPHONE ENCOUNTER
"Requested Prescriptions   Pending Prescriptions Disp Refills     losartan (COZAAR) 100 MG tablet [Pharmacy Med Name: LOSARTAN 100MG TABS] 90 tablet 1     Sig: TAKE ONE TABLET BY MOUTH ONCE DAILY    Angiotensin-II Receptors Failed    1/29/2018  1:06 PM       Failed - Blood pressure under 140/90 in past 12 months.    BP Readings from Last 3 Encounters:   11/03/17 180/86   10/19/17 162/78   10/09/17 (!) 170/93                Passed - Recent or future visit with authorizing provider's specialty    Patient had office visit in the last year or has a visit in the next 30 days with authorizing provider.  See \"Patient Info\" tab in inbasket, or \"Choose Columns\" in Meds & Orders section of the refill encounter.            Passed - Patient is age 18 or older       Passed - No active pregnancy on record       Passed - Normal serum creatinine on file in past 12 months    Recent Labs   Lab Test  10/09/17   1030   CR  0.93            Passed - Normal serum potassium on file in past 12 months    Recent Labs   Lab Test  10/09/17   1030   POTASSIUM  4.1                   Passed - No positive pregnancy test in past 12 months          Last Written Prescription Date:  8/7/17  Last Fill Quantity: 90,  # refills: 1   Last Office Visit with G, P or [facility] prescribing provider:  10/19/17   Future Office Visit:       "

## 2018-01-30 RX ORDER — LOSARTAN POTASSIUM 100 MG/1
TABLET ORAL
Qty: 90 TABLET | Refills: 1 | Status: SHIPPED | OUTPATIENT
Start: 2018-01-30 | End: 2018-07-31

## 2018-01-30 NOTE — TELEPHONE ENCOUNTER
Routing refill request to provider for review/approval because:  Blood pressures are elevated above goal.     Barbi Madden RN

## 2018-02-05 DIAGNOSIS — I10 HYPERTENSION GOAL BP (BLOOD PRESSURE) < 140/90: ICD-10-CM

## 2018-02-05 DIAGNOSIS — E78.5 HYPERLIPIDEMIA LDL GOAL <100: ICD-10-CM

## 2018-02-06 RX ORDER — AMLODIPINE BESYLATE 5 MG/1
TABLET ORAL
Qty: 45 TABLET | Refills: 2 | Status: SHIPPED | OUTPATIENT
Start: 2018-02-06 | End: 2018-10-30

## 2018-02-06 NOTE — TELEPHONE ENCOUNTER
Routing to covering provider as PCP is currently out of clinic.     Routing refill request to provider for review/approval because:  Blood pressures are elevated above goal.     Barbi Madden RN

## 2018-04-09 ENCOUNTER — TELEPHONE (OUTPATIENT)
Dept: FAMILY MEDICINE | Facility: CLINIC | Age: 78
End: 2018-04-09

## 2018-04-09 NOTE — TELEPHONE ENCOUNTER
Reason for Call:  Same Day Appointment, Requested Provider:  Weston Raines M.D.    PCP: Weston Raines    Reason for visit: Pre-op     Duration of symptoms:     Have you been treated for this in the past?     Additional comments: Patient states she is having cataract surgery on 5/3/2018 and 5/17/2018 and is wondering if Dr. Raines can work her in to his schedule some time to be seen for a pre-op physical. She said she is available any time and any day. She is aware that he is out of clinic this week and is okay waiting for his return on 4/16 to hear back. Please advise.     Can we leave a detailed message on this number? YES    Phone number patient can be reached at: Home number on file [phone] (home)    Best Time: any     Call taken on 4/9/2018 at 1:02 PM by Nathan Ramsay

## 2018-04-26 ENCOUNTER — OFFICE VISIT (OUTPATIENT)
Dept: FAMILY MEDICINE | Facility: CLINIC | Age: 78
End: 2018-04-26
Payer: COMMERCIAL

## 2018-04-26 VITALS
SYSTOLIC BLOOD PRESSURE: 150 MMHG | HEART RATE: 61 BPM | DIASTOLIC BLOOD PRESSURE: 84 MMHG | TEMPERATURE: 97.3 F | HEIGHT: 63 IN | RESPIRATION RATE: 13 BRPM | WEIGHT: 146.13 LBS | BODY MASS INDEX: 25.89 KG/M2 | OXYGEN SATURATION: 95 %

## 2018-04-26 DIAGNOSIS — Z01.818 PREOP GENERAL PHYSICAL EXAM: Primary | ICD-10-CM

## 2018-04-26 DIAGNOSIS — I10 HYPERTENSION GOAL BP (BLOOD PRESSURE) < 140/90: ICD-10-CM

## 2018-04-26 DIAGNOSIS — E78.5 HYPERLIPIDEMIA LDL GOAL <100: ICD-10-CM

## 2018-04-26 LAB
ALT SERPL W P-5'-P-CCNC: 21 U/L (ref 0–50)
ANION GAP SERPL CALCULATED.3IONS-SCNC: 7 MMOL/L (ref 3–14)
BUN SERPL-MCNC: 9 MG/DL (ref 7–30)
CALCIUM SERPL-MCNC: 8.6 MG/DL (ref 8.5–10.1)
CHLORIDE SERPL-SCNC: 104 MMOL/L (ref 94–109)
CHOLEST SERPL-MCNC: 185 MG/DL
CO2 SERPL-SCNC: 28 MMOL/L (ref 20–32)
CREAT SERPL-MCNC: 0.94 MG/DL (ref 0.52–1.04)
CREAT UR-MCNC: 100 MG/DL
GFR SERPL CREATININE-BSD FRML MDRD: 57 ML/MIN/1.7M2
GLUCOSE SERPL-MCNC: 114 MG/DL (ref 70–99)
HDLC SERPL-MCNC: 48 MG/DL
LDLC SERPL CALC-MCNC: 116 MG/DL
MICROALBUMIN UR-MCNC: 27 MG/L
MICROALBUMIN/CREAT UR: 27.4 MG/G CR (ref 0–25)
NONHDLC SERPL-MCNC: 137 MG/DL
POTASSIUM SERPL-SCNC: 4.3 MMOL/L (ref 3.4–5.3)
SODIUM SERPL-SCNC: 139 MMOL/L (ref 133–144)
TRIGL SERPL-MCNC: 105 MG/DL

## 2018-04-26 PROCEDURE — 80048 BASIC METABOLIC PNL TOTAL CA: CPT | Performed by: FAMILY MEDICINE

## 2018-04-26 PROCEDURE — 84460 ALANINE AMINO (ALT) (SGPT): CPT | Performed by: FAMILY MEDICINE

## 2018-04-26 PROCEDURE — 36415 COLL VENOUS BLD VENIPUNCTURE: CPT | Performed by: FAMILY MEDICINE

## 2018-04-26 PROCEDURE — 99214 OFFICE O/P EST MOD 30 MIN: CPT | Performed by: FAMILY MEDICINE

## 2018-04-26 PROCEDURE — 80061 LIPID PANEL: CPT | Performed by: FAMILY MEDICINE

## 2018-04-26 PROCEDURE — 82043 UR ALBUMIN QUANTITATIVE: CPT | Performed by: FAMILY MEDICINE

## 2018-04-26 RX ORDER — ATENOLOL 100 MG/1
100 TABLET ORAL 2 TIMES DAILY
Qty: 180 TABLET | Refills: 3 | Status: SHIPPED | OUTPATIENT
Start: 2018-04-26 | End: 2019-02-25

## 2018-04-26 ASSESSMENT — PAIN SCALES - GENERAL: PAINLEVEL: MODERATE PAIN (5)

## 2018-04-26 NOTE — PROGRESS NOTES
[address] [address]  [phone]  Dept: [phone]    PRE-OP EVALUATION:  Today's date: 2018    Keyla Mari (: 1940) presents for pre-operative evaluation assessment as requested by Dr. Neves, Chevy Chen MD.  She requires evaluation and anesthesia risk assessment prior to undergoing surgery/procedure for treatment of PHACOEMULSIFICATION WITH STANDARD INTRAOCULAR LENS IMPLANT RIGHT .    Proposed Surgery/ Procedure: PHACOEMULSIFICATION WITH STANDARD INTRAOCULAR LENS IMPLANT RIGHT  Date of Surgery/ Procedure: 18  Time of Surgery/ Procedure: 12:45 pm  Hospital/Surgical Facility: [facility]    Primary Physician: Weston Raines  Type of Anesthesia Anticipated: Local with MAC    Patient has a Health Care Directive or Living Will:  YES - on file here    1. NO - Do you have a history of heart attack, stroke, stent, bypass or surgery on an artery in the head, neck, heart or legs?  2. NO - Do you ever have any pain or discomfort in your chest?  3. NO - Do you have a history of  Heart Failure?  4. YES - ARE YOUR TROUBLED BY SHORTNESS OF BREATH WHEN WALKING ON THE LEVEL, UP A SLIGHT HILL OR AT NIGHT? She states at times when walking uphill  5. NO - Do you currently have a cold, bronchitis or other respiratory infection?  6. NO - Do you have a cough, shortness of breath or wheezing?  7. NO - Do you sometimes get pains in the calves of your legs when you walk?  8. YES - DO YOU OR ANYONE IN YOUR FAMILY HAVE PREVIOUS HISTORY OF BLOOD CLOTS? Her mother had blood clots in legs  9. NO - Do you or does anyone in your family have a serious bleeding problem such as prolonged bleeding following surgeries or cuts?  10. NO - Have you ever had problems with anemia or been told to take iron pills?  11. NO - Have you had any abnormal blood loss such as black, tarry or bloody stools, or abnormal vaginal bleeding?  12. YES - HAVE YOU EVER HAD A BLOOD  TRANSFUSION? When she had a colonoscopy she started to hemorrhage after about 11 yrs ago  13. YES - HAVE YOU OR ANY OF YOUR RELATIVES EVER HAD PROBLEMS WITH ANESTHESIA? She had nausea in the past from anesthesia  14. NO - Do you have sleep apnea, excessive snoring or daytime drowsiness?  15. NO - Do you have any prosthetic heart valves?  16. NO - Do you have prosthetic joints?  17. NO - Is there any chance that you may be pregnant?      HPI:     HPI related to upcoming procedure: Cataract       HYPERLIPIDEMIA - Patient has a long history of significant Hyperlipidemia requiring medication for treatment with recent good control. Patient reports no problems or side effects with the medication.                                                                                                                                                       .  HYPERTENSION - Patient has longstanding history of HTN , currently denies any symptoms referable to elevated blood pressure. Specifically denies chest pain, palpitations, dyspnea, orthopnea, PND or peripheral edema. Blood pressure readings have been in normal range. Current medication regimen is as listed below. Patient denies any side effects of medication.                                                                                                                                                                                          .    MEDICAL HISTORY:     Patient Active Problem List    Diagnosis Date Noted     Hypertension goal BP (blood pressure) < 150/90 08/07/2017     Priority: Medium     Nodule of right lung 06/05/2015     Priority: Medium     Lung mass, spiculated in the right upper lobe, 1.1 x 2.2 x 0.6 cm. 05/20/2015     Priority: Medium     HTN (hypertension) 05/13/2015     Priority: Medium     Mandibular soft tissue mass 05/13/2015     Priority: Medium     Absence of menstruation 07/31/2014     Priority: Medium     Transient cerebral ischemia 07/20/2014      Priority: Medium     Diagnosis updated by automated process. Provider to review and confirm.       Cerebral infarction (H) 06/25/2013     Priority: Medium     Diagnosis updated by automated process. Provider to review and confirm.       Sleep disorder 10/10/2012     Priority: Medium     Advanced directives, counseling/discussion 07/09/2012     Priority: Medium     Has a copy already here in clinic she states.  7/9/2012        Proximal humerus fracture 03/07/2011     Priority: Medium     CKD (chronic kidney disease) stage 3, GFR 30-59 ml/min 02/10/2011     Priority: Medium     Lumbar radiculopathy 02/09/2011     Priority: Medium     HYPERLIPIDEMIA LDL GOAL <100 10/31/2010     Priority: Medium     Gout 10/11/2010     Priority: Medium     Displacement of lumbar intervertebral disc without myelopathy 04/07/2008     Priority: Medium     Transient cerebral ischemia 01/24/2006     Priority: Medium     Problem list name updated by automated process. Provider to review        Past Medical History:   Diagnosis Date     Acute, but ill-defined, cerebrovascular disease      Closed fracture of unspecified part of neck of femur     Hip fracture     CVA (cerebral infarction) 2005     Elevated blood pressure reading without diagnosis of hypertension      Hemorrhage of gastrointestinal tract, unspecified 08/13/2007    Transfer to [facility]     History of blood transfusion     after colonoscopy     HTN (hypertension)      Hyperlipidaemia      Other and unspecified hyperlipidemia      PONV (postoperative nausea and vomiting)      Unspecified cerebral artery occlusion with cerebral infarction      Past Surgical History:   Procedure Laterality Date     COLONOSCOPY  08/13/07      COLONOSCOPY W BIOPSY  07/01/07      DILATION/CURETTAGE DIAG/THER NON OB  1984    D & C     LOBECTOMY LUNG Right 6/5/2015    Procedure: LOBECTOMY LUNG;  Surgeon: Yariel Lund MD;  Location:  OR     SURGICAL HISTORY OF -       Left leg fx--had  "pinning     THORACOTOMY Right 6/5/2015    Procedure: THORACOTOMY;  Surgeon: Yariel Lund MD;  Location:  OR     Current Outpatient Prescriptions   Medication Sig Dispense Refill     amLODIPine (NORVASC) 5 MG tablet Take 1 tablet (5 mg) by mouth daily 30 tablet 0     amLODIPine (NORVASC) 5 MG tablet TAKE ONE-HALF TABLET BY MOUTH EVERY DAY 45 tablet 2     aspirin 81 MG tablet Take 1 tablet (81 mg) by mouth daily       atenolol (TENORMIN) 100 MG tablet TAKE ONE TABLET BY MOUTH TWICE A  tablet 1     losartan (COZAAR) 100 MG tablet TAKE ONE TABLET BY MOUTH ONCE DAILY 90 tablet 1     simvastatin (ZOCOR) 20 MG tablet Take 1 tablet (20 mg) by mouth At Bedtime 90 tablet 3     [DISCONTINUED] doxazosin (CARDURA) 2 MG tablet Take 1 tablet (2 mg) by mouth At Bedtime One pill at bedtime. 14 tablet 0     OTC products: None, except as noted above    Allergies   Allergen Reactions     Indomethacin GI Disturbance      Latex Allergy: NO    Social History   Substance Use Topics     Smoking status: Former Smoker     Quit date: 3/16/1985     Smokeless tobacco: Never Used     Alcohol use No     History   Drug Use No       REVIEW OF SYSTEMS:   CONSTITUTIONAL: NEGATIVE for fever, chills, change in weight  ENT/MOUTH: NEGATIVE for ear, mouth and throat problems  RESP: NEGATIVE for significant cough or SOB  CV: NEGATIVE for chest pain, palpitations or peripheral edema    EXAM:   /84  Pulse 61  Temp 97.3  F (36.3  C) (Tympanic)  Resp 13  Ht 5' 2.8\" (1.595 m)  Wt 146 lb 2 oz (66.3 kg)  SpO2 95%  BMI 26.05 kg/m2  GENERAL APPEARANCE: healthy, alert and no distress  HENT: ear canals and TM's normal and nose and mouth without ulcers or lesions  RESP: lungs clear to auscultation - no rales, rhonchi or wheezes  CV: regular rate and rhythm, normal S1 S2, no S3 or S4 and no murmur, click or rub   NEURO: Normal strength and tone, sensory exam grossly normal, mentation intact and speech normal    DIAGNOSTICS:   No labs or " EKG required for low risk surgery (cataract, skin procedure, breast biopsy, etc)    Recent Labs   Lab Test  10/09/17   1030  08/07/17   1543  08/07/17   1444   06/05/15   0603   07/19/14   2218   HGB  15.4  14.3   --    < >  14.6   < >   --    PLT  211  231   --    < >  183   < >   --    INR   --    --    --    --   0.99   --   1.0   NA  138   --   135   < >  134   < >   --    POTASSIUM  4.1   --   3.7   < >  3.5   < >   --    CR  0.93   --   0.90   < >  0.84   < >   --     < > = values in this interval not displayed.      Results for orders placed or performed in visit on 04/26/18 (from the past 24 hour(s))   Basic metabolic panel  (Ca, Cl, CO2, Creat, Gluc, K, Na, BUN)   Result Value Ref Range    Sodium 139 133 - 144 mmol/L    Potassium 4.3 3.4 - 5.3 mmol/L    Chloride 104 94 - 109 mmol/L    Carbon Dioxide 28 20 - 32 mmol/L    Anion Gap 7 3 - 14 mmol/L    Glucose 114 (H) 70 - 99 mg/dL    Urea Nitrogen 9 7 - 30 mg/dL    Creatinine 0.94 0.52 - 1.04 mg/dL    GFR Estimate 57 (L) >60 mL/min/1.7m2    GFR Estimate If Black 69 >60 mL/min/1.7m2    Calcium 8.6 8.5 - 10.1 mg/dL   Lipid panel reflex to direct LDL Fasting   Result Value Ref Range    Cholesterol 185 <200 mg/dL    Triglycerides 105 <150 mg/dL    HDL Cholesterol 48 (L) >49 mg/dL    LDL Cholesterol Calculated 116 (H) <100 mg/dL    Non HDL Cholesterol 137 (H) <130 mg/dL   ALT   Result Value Ref Range    ALT 21 0 - 50 U/L       IMPRESSION:   Reason for surgery/procedure: Cataracts  Diagnosis/reason for consult: Pre-op    The proposed surgical procedure is considered LOW risk.    REVISED CARDIAC RISK INDEX  The patient has the following serious cardiovascular risks for perioperative complications such as (MI, PE, VFib and 3  AV Block):  No serious cardiac risks  INTERPRETATION: 0 risks: Class I (very low risk - 0.4% complication rate)    The patient has the following additional risks for perioperative complications:  No identified additional risks      ICD-10-CM     1. Preop general physical exam Z01.818        RECOMMENDATIONS:         --Patient is to take only Atenolol on AM of surgery all other meds later that day      APPROVAL GIVEN to proceed with proposed procedure, without further diagnostic evaluation       Signed Electronically by: Weston Raines MD    Copy of this evaluation report is provided to requesting physician.    [place] Preop Guidelines    Revised Cardiac Risk Index

## 2018-04-26 NOTE — NURSING NOTE
"Chief Complaint   Patient presents with     Pre-Op Exam       Initial /84  Pulse 61  Temp 97.3  F (36.3  C) (Tympanic)  Resp 13  Ht 5' 2.8\" (1.595 m)  Wt 146 lb 2 oz (66.3 kg)  SpO2 95%  BMI 26.05 kg/m2 Estimated body mass index is 26.05 kg/(m^2) as calculated from the following:    Height as of this encounter: 5' 2.8\" (1.595 m).    Weight as of this encounter: 146 lb 2 oz (66.3 kg).  Medication Reconciliation: complete   There are no preventive care reminders to display for this patient.  Norma Kim, [facility]      "

## 2018-04-26 NOTE — MR AVS SNAPSHOT
After Visit Summary   4/26/2018    Keyla Mari    MRN: [id number]           Patient Information     Date Of Birth          1940        Visit Information        Provider Department      4/26/2018 7:40 AM Weston Raines MD [facility]        Today's Diagnoses     Preop general physical exam    -  1    Hypertension goal BP (blood pressure) < 140/90        Hyperlipidemia LDL goal <100          Care Instructions      Before Your Surgery      Call your surgeon if there is any change in your health. This includes signs of a cold or flu (such as a sore throat, runny nose, cough, rash or fever).    Do not smoke, drink alcohol or take over the counter medicine (unless your surgeon or primary care doctor tells you to) for the 24 hours before and after surgery.    If you take prescribed drugs: Follow your doctor s orders about which medicines to take and which to stop until after surgery.    Eating and drinking prior to surgery: follow the instructions from your surgeon    Take a shower or bath the night before surgery. Use the soap your surgeon gave you to gently clean your skin. If you do not have soap from your surgeon, use your regular soap. Do not shave or scrub the surgery site.  Wear clean pajamas and have clean sheets on your bed.           Follow-ups after your visit        Your next 10 appointments already scheduled     May 03, 2018   Procedure with Chevy Neves MD   [facility] ([facility])    [address] [place] [address] [address] [place] [address]   [phone]           From [address]: Exit at [address] on south side of [place]. Turn right on [address]. Turn left at stoplight on [address]. [facility] will be in view two blocks ahead              Who to contact     If you have questions or need follow up information about today's clinic visit or your schedule please contact [facility] directly at  "[phone].  Normal or non-critical lab and imaging results will be communicated to you by MyChart, letter or phone within 4 business days after the clinic has received the results. If you do not hear from us within 7 days, please contact the clinic through [employer] or phone. If you have a critical or abnormal lab result, we will notify you by phone as soon as possible.  Submit refill requests through [employer] or call your pharmacy and they will forward the refill request to us. Please allow 3 business days for your refill to be completed.          Additional Information About Your Visit        [employer] Information     [employer] lets you send messages to your doctor, view your test results, renew your prescriptions, schedule appointments and more. To sign up, go to [email or web address] . Click on \"Log in\" on the left side of the screen, which will take you to the Welcome page. Then click on \"Sign up Now\" on the right side of the page.     You will be asked to enter the access code listed below, as well as some personal information. Please follow the directions to create your username and password.     Your access code is: [email or web address]  Expires: 2018  8:53 AM     Your access code will  in 90 days. If you need help or a new code, please call your [place] clinic or [phone].        Care EveryWhere ID     This is your Care EveryWhere ID. This could be used by other organizations to access your [place] medical records  [id number]        Your Vitals Were     Pulse Temperature Respirations Height Pulse Oximetry BMI (Body Mass Index)    61 97.3  F (36.3  C) (Tympanic) 13 5' 2.8\" (1.595 m) 95% 26.05 kg/m2       Blood Pressure from Last 3 Encounters:   18 150/84   17 180/86   10/19/17 162/78    Weight from Last 3 Encounters:   18 146 lb 2 oz (66.3 kg)   17 152 lb (68.9 kg)   10/19/17 153 lb (69.4 kg)              We Performed the Following     Albumin Random Urine Quantitative with " Creat Ratio     ALT     Basic metabolic panel  (Ca, Cl, CO2, Creat, Gluc, K, Na, BUN)     Lipid panel reflex to direct LDL Fasting          Today's Medication Changes          These changes are accurate as of 4/26/18  9:59 AM.  If you have any questions, ask your nurse or doctor.               These medicines have changed or have updated prescriptions.        Dose/Directions    atenolol 100 MG tablet   Commonly known as:  TENORMIN   This may have changed:  See the new instructions.   Used for:  Hypertension goal BP (blood pressure) < 140/90   Changed by:  Weston Raines MD        Dose:  100 mg   Take 1 tablet (100 mg) by mouth 2 times daily   Quantity:  180 tablet   Refills:  3            Where to get your medicines      These medications were sent to [address] [place] - [address]  [address], [place] [address]     Phone:  [phone]     atenolol 100 MG tablet                Primary Care Provider Office Phone # Fax #    Weston Raines [phone]       [address] [place] [address]        Equal Access to Services     [facility]: Hadii aad ku hadasho Soomaali, waaxda luqadaha, qaybta kaalmada adeegyada, waxay idiin hayana paula milligan . So [facility] [place] [facility] [phone].    ATENCIÓN: Si habla español, tiene a singh disposición servicios gratuitos de asistencia lingüística. Llame al [phone].    We comply with applicable federal civil rights laws and [place] laws. We do not discriminate on the basis of race, color, national origin, age, disability, sex, sexual orientation, or gender identity.            Thank you!     Thank you for choosing [facility]  for your care. Our goal is always to provide you with excellent care. Hearing back from our patients is one way we can continue to improve our services. Please take a few minutes to complete the written survey that you may receive in the mail after your visit with us. Thank you!             Your  Updated Medication List - Protect others around you: Learn how to safely use, store and throw away your medicines at www.disposemymeds.org.          This list is accurate as of 4/26/18  9:59 AM.  Always use your most recent med list.                   Brand Name Dispense Instructions for use Diagnosis    amLODIPine 5 MG tablet    NORVASC    45 tablet    TAKE ONE-HALF TABLET BY MOUTH EVERY DAY    Hypertension goal BP (blood pressure) < 140/90, Hyperlipidemia LDL goal <100       aspirin 81 MG tablet      Take 1 tablet (81 mg) by mouth daily        atenolol 100 MG tablet    TENORMIN    180 tablet    Take 1 tablet (100 mg) by mouth 2 times daily    Hypertension goal BP (blood pressure) < 140/90       losartan 100 MG tablet    COZAAR    90 tablet    TAKE ONE TABLET BY MOUTH ONCE DAILY    Hypertension goal BP (blood pressure) < 150/90       simvastatin 20 MG tablet    ZOCOR    90 tablet    Take 1 tablet (20 mg) by mouth At Bedtime    Hyperlipidemia LDL goal <100

## 2018-05-03 ENCOUNTER — ANESTHESIA EVENT (OUTPATIENT)
Dept: SURGERY | Facility: CLINIC | Age: 78
End: 2018-05-03
Payer: MEDICARE

## 2018-05-03 ENCOUNTER — ANESTHESIA (OUTPATIENT)
Dept: SURGERY | Facility: CLINIC | Age: 78
End: 2018-05-03
Payer: MEDICARE

## 2018-05-03 ENCOUNTER — HOSPITAL ENCOUNTER (OUTPATIENT)
Facility: CLINIC | Age: 78
Discharge: HOME | End: 2018-05-03
Attending: OPHTHALMOLOGY | Admitting: OPHTHALMOLOGY
Payer: MEDICARE

## 2018-05-03 ENCOUNTER — SURGERY (OUTPATIENT)
Age: 78
End: 2018-05-03

## 2018-05-03 VITALS
RESPIRATION RATE: 16 BRPM | DIASTOLIC BLOOD PRESSURE: 98 MMHG | TEMPERATURE: 97.6 F | BODY MASS INDEX: 25.89 KG/M2 | OXYGEN SATURATION: 98 % | WEIGHT: 146.13 LBS | HEIGHT: 63 IN | SYSTOLIC BLOOD PRESSURE: 148 MMHG

## 2018-05-03 DIAGNOSIS — H25.11 NUCLEAR SCLEROTIC CATARACT, RIGHT: Primary | ICD-10-CM

## 2018-05-03 PROCEDURE — V2632 POST CHMBR INTRAOCULAR LENS: HCPCS | Performed by: OPHTHALMOLOGY

## 2018-05-03 PROCEDURE — 25000125 ZZHC RX 250: Performed by: OPHTHALMOLOGY

## 2018-05-03 PROCEDURE — 25000125 ZZHC RX 250: Performed by: NURSE ANESTHETIST, CERTIFIED REGISTERED

## 2018-05-03 PROCEDURE — 25000128 H RX IP 250 OP 636: Performed by: OPHTHALMOLOGY

## 2018-05-03 PROCEDURE — 71000022 ZZH RECOVERY CATRACT PACKAGE: Performed by: OPHTHALMOLOGY

## 2018-05-03 PROCEDURE — 36000025 ZZH CATARACT SURGICAL PACKAGE: Performed by: OPHTHALMOLOGY

## 2018-05-03 PROCEDURE — 25000128 H RX IP 250 OP 636: Performed by: NURSE ANESTHETIST, CERTIFIED REGISTERED

## 2018-05-03 PROCEDURE — 37000012 ZZH ANESTHESIA CATARACT PACKAGE: Performed by: OPHTHALMOLOGY

## 2018-05-03 DEVICE — EYE IMP IOL AMO PCL TECNIS ZCB00 21.0: Type: IMPLANTABLE DEVICE | Site: EYE | Status: FUNCTIONAL

## 2018-05-03 RX ORDER — NALOXONE HYDROCHLORIDE 0.4 MG/ML
.1-.4 INJECTION, SOLUTION INTRAMUSCULAR; INTRAVENOUS; SUBCUTANEOUS
Status: CANCELLED | OUTPATIENT
Start: 2018-05-03 | End: 2018-05-04

## 2018-05-03 RX ORDER — MEPERIDINE HYDROCHLORIDE 50 MG/ML
12.5 INJECTION INTRAMUSCULAR; INTRAVENOUS; SUBCUTANEOUS
Status: CANCELLED | OUTPATIENT
Start: 2018-05-03

## 2018-05-03 RX ORDER — PROPARACAINE HYDROCHLORIDE 5 MG/ML
1 SOLUTION/ DROPS OPHTHALMIC ONCE
Status: COMPLETED | OUTPATIENT
Start: 2018-05-03 | End: 2018-05-03

## 2018-05-03 RX ORDER — LIDOCAINE HYDROCHLORIDE 10 MG/ML
INJECTION, SOLUTION INFILTRATION; PERINEURAL PRN
Status: DISCONTINUED | OUTPATIENT
Start: 2018-05-03 | End: 2018-05-03 | Stop reason: HOSPADM

## 2018-05-03 RX ORDER — SODIUM CHLORIDE, SODIUM LACTATE, POTASSIUM CHLORIDE, CALCIUM CHLORIDE 600; 310; 30; 20 MG/100ML; MG/100ML; MG/100ML; MG/100ML
INJECTION, SOLUTION INTRAVENOUS CONTINUOUS
Status: CANCELLED | OUTPATIENT
Start: 2018-05-03

## 2018-05-03 RX ORDER — KETAMINE HYDROCHLORIDE 10 MG/ML
INJECTION INTRAMUSCULAR; INTRAVENOUS PRN
Status: DISCONTINUED | OUTPATIENT
Start: 2018-05-03 | End: 2018-05-03

## 2018-05-03 RX ORDER — CYCLOPENTOLATE HYDROCHLORIDE 10 MG/ML
1 SOLUTION/ DROPS OPHTHALMIC
Status: COMPLETED | OUTPATIENT
Start: 2018-05-03 | End: 2018-05-03

## 2018-05-03 RX ORDER — ONDANSETRON 4 MG/1
4 TABLET, ORALLY DISINTEGRATING ORAL EVERY 30 MIN PRN
Status: CANCELLED | OUTPATIENT
Start: 2018-05-03

## 2018-05-03 RX ORDER — TROPICAMIDE 10 MG/ML
1 SOLUTION/ DROPS OPHTHALMIC
Status: COMPLETED | OUTPATIENT
Start: 2018-05-03 | End: 2018-05-03

## 2018-05-03 RX ORDER — ONDANSETRON 2 MG/ML
4 INJECTION INTRAMUSCULAR; INTRAVENOUS EVERY 30 MIN PRN
Status: CANCELLED | OUTPATIENT
Start: 2018-05-03

## 2018-05-03 RX ORDER — PHENYLEPHRINE HYDROCHLORIDE 25 MG/ML
1 SOLUTION/ DROPS OPHTHALMIC
Status: COMPLETED | OUTPATIENT
Start: 2018-05-03 | End: 2018-05-03

## 2018-05-03 RX ADMIN — TROPICAMIDE 1 DROP: 10 SOLUTION/ DROPS OPHTHALMIC at 11:19

## 2018-05-03 RX ADMIN — EPINEPHRINE 300 ML: 1 INJECTION, SOLUTION INTRAMUSCULAR; SUBCUTANEOUS at 12:25

## 2018-05-03 RX ADMIN — TROPICAMIDE 1 DROP: 10 SOLUTION/ DROPS OPHTHALMIC at 11:40

## 2018-05-03 RX ADMIN — PROPARACAINE HYDROCHLORIDE 1 DROP: 5 SOLUTION/ DROPS OPHTHALMIC at 11:19

## 2018-05-03 RX ADMIN — Medication 10 MG: at 12:17

## 2018-05-03 RX ADMIN — LIDOCAINE HYDROCHLORIDE 1 ML: 10 INJECTION, SOLUTION INFILTRATION; PERINEURAL at 12:25

## 2018-05-03 RX ADMIN — MIDAZOLAM 1 MG: 1 INJECTION INTRAMUSCULAR; INTRAVENOUS at 12:15

## 2018-05-03 RX ADMIN — TROPICAMIDE 1 DROP: 10 SOLUTION/ DROPS OPHTHALMIC at 11:27

## 2018-05-03 RX ADMIN — CYCLOPENTOLATE HYDROCHLORIDE 1 DROP: 10 SOLUTION/ DROPS OPHTHALMIC at 11:27

## 2018-05-03 RX ADMIN — MIDAZOLAM 0.5 MG: 1 INJECTION INTRAMUSCULAR; INTRAVENOUS at 12:13

## 2018-05-03 RX ADMIN — EPINEPHRINE 2 ML: 1 INJECTION, SOLUTION INTRAMUSCULAR; SUBCUTANEOUS at 12:25

## 2018-05-03 RX ADMIN — PROPARACAINE HYDROCHLORIDE 1 DROP: 5 SOLUTION/ DROPS OPHTHALMIC at 11:40

## 2018-05-03 RX ADMIN — Medication 1.4 ML GIVEN: at 12:26

## 2018-05-03 RX ADMIN — MIDAZOLAM 0.5 MG: 1 INJECTION INTRAMUSCULAR; INTRAVENOUS at 12:14

## 2018-05-03 RX ADMIN — PHENYLEPHRINE HYDROCHLORIDE 1 DROP: 25 SOLUTION/ DROPS OPHTHALMIC at 11:40

## 2018-05-03 RX ADMIN — CYCLOPENTOLATE HYDROCHLORIDE 1 DROP: 10 SOLUTION/ DROPS OPHTHALMIC at 11:40

## 2018-05-03 RX ADMIN — CYCLOPENTOLATE HYDROCHLORIDE 1 DROP: 10 SOLUTION/ DROPS OPHTHALMIC at 11:19

## 2018-05-03 RX ADMIN — PHENYLEPHRINE HYDROCHLORIDE 1 DROP: 25 SOLUTION/ DROPS OPHTHALMIC at 11:27

## 2018-05-03 RX ADMIN — LIDOCAINE HYDROCHLORIDE 1 ML: 10 INJECTION, SOLUTION EPIDURAL; INFILTRATION; INTRACAUDAL; PERINEURAL at 11:19

## 2018-05-03 RX ADMIN — PHENYLEPHRINE HYDROCHLORIDE 1 DROP: 25 SOLUTION/ DROPS OPHTHALMIC at 11:19

## 2018-05-03 ASSESSMENT — LIFESTYLE VARIABLES: TOBACCO_USE: 1

## 2018-05-03 NOTE — ANESTHESIA PREPROCEDURE EVALUATION
Anesthesia Evaluation     . Pt has had prior anesthetic. Type: MAC and General    History of anesthetic complications   - PONV        ROS/MED HX    ENT/Pulmonary:     (+)tobacco use, Past use , . Other pulmonary disease h/o right sided lobectomy.    Neurologic:     (+)CVA date: 2013 without deficitsTIA     Cardiovascular:     (+) hypertension----. : . . . :. . Previous cardiac testing date:results:date:5/12/16 results:. Patient experienced no chest pain throughout the procedure. Patient did have SOB and ST depression Positive stress test suggestive of ischemia date:10/9/17 results:Bradycardia with rate variation date: results:          METS/Exercise Tolerance:     Hematologic:         Musculoskeletal:   (+) arthritis, , , other musculoskeletal- lumbar radiculopathy      GI/Hepatic:  - neg GI/hepatic ROS       Renal/Genitourinary:     (+) chronic renal disease, type: CRI, Pt has no history of transplant,       Endo:  - neg endo ROS       Psychiatric:  - neg psychiatric ROS       Infectious Disease:         Malignancy:      - no malignancy   Other:    (+) No chance of pregnancy C-spine cleared: N/A, no H/O Chronic Pain,                   Physical Exam  Normal systems: cardiovascular and dental    Airway   Mallampati: II  TM distance: >3 FB  Neck ROM: full    Dental     Cardiovascular   Rhythm and rate: regular and normal      Pulmonary (+) decreased breath sounds       Other findings: Diminished breath sounds right side. Patient has has a right sided lobectomy in the past.                 Anesthesia Plan      History & Physical Review  History and physical reviewed and following examination; no interval change.    ASA Status:  3 .    NPO Status:  > 8 hours    Plan for MAC with Intravenous induction. Reason for MAC:  Deep or markedly invasive procedure (G8)         Postoperative Care  Postoperative pain management:  Oral pain medications.      Consents  Anesthetic plan, risks, benefits and alternatives discussed  with:  Patient..                          .

## 2018-05-03 NOTE — OP NOTE
PREOPERATIVE DIAGNOSIS: Visually significant cataract, Right eye   POSTOPERATIVE DIAGNOSIS: Same   PROCEDURES:   1. Cataract extraction with intraocular lens implant Right eye.  SURGEON: Chevy Neves M.D.  INDICATIONS: The patient Keyla Mari presented to the eye clinic with decreased vision secondary to cataract in the Right eye. The risks, including, but not limited to infection, loss of vision, loss of eye, need for more surgery, and bleeding, along with the benefits, alternatives, expectations, and the procedure itself were discussed at length with the patient who wished to proceed with surgery. All questions were answered to the patient's satisfaction.  DESCRIPTION OF PROCEDURE:   Prior to the procedure, appropriate cardiac and respiratory monitors were applied to the patient.  In the pre-operative holding area, a drop of topical tetracaine followed by povidone iodine followed by lidocaine gel.  The patient was brought to the operating room where a surgical pause was carried out to identify with all members of the surgical team the correct surgical site.  With adequate anesthesia, the Right eye was prepped and draped in the usual sterile fashion. A lid speculum was placed, and the operating microscope was rotated into position. A paracentesis was created.  Through this limbal paracentesis, the anterior chamber was filled with preservative-free lidocaine and epinephrine followed by viscoelastic.   A temporal clear corneal incision was created at the limbus using a 2.6 mm blade. Due to poor mydriasis, a Malyugin (6.25 mm) capsular ring was inserted in to the eye to provide for pupillary expansion.  A capsulorrhexis was initiated using a cystotome and was completed in continuous and circular fashion using the capsulorrhexis forceps. The lens nucleus was hydrodissected using balanced salt solution.  The lens nucleus was rotated and removed using phacoemulsification in a stop and chop technique.  Residual  cortical material was removed using irrigation-aspiration.  The capsular bag was reinflated to its maximal extent with cohesive viscoelastic.  A 21.0 diopter ZCBOO was inserted into the capsular bag and noted to be well centered.  The lens power selected was reviewed using the intraocular lens power measurements that were obtained preoperatively to confirm that the correct lens was selected for the desired post-operative refractive state. The Malyugin ring was easily removed. The residual viscoelastic was aspirated. The anterior chamber was inflated with balanced salt solution and the wounds were hydrated and found to be self-sealing.  The eye was palpated and found to be of normal physiologic pressure. The lid speculum was removed. One drop of postoperative anti-inflammatory and antibiotic medication was instilled in the eye and a clear shield placed over the eye. The patient tolerated the procedure well and there were no intraoperative complications.      PLAN: The patient will be discharged to home and will follow up tomorrow in the eye clinic.  EBL:  None  Complications:  None    Implant Name Type Inv. Item Serial No.  Lot No. LRB No. Used   EYE IMP IOL LÓPEZ PCL TECNIS [id number] 21.0 Lens/Eye Implant EYE IMP IOL LÓPEZ PCL TECNIS ZCB00 21.0 [id number] [employer]   Right 1

## 2018-05-03 NOTE — IP AVS SNAPSHOT
MRN:[id number]                      After Visit Summary   5/3/2018    Keyla Mari    MRN: [id number]           Thank you!     Thank you for choosing [facility] for your care. Our goal is always to provide you with excellent care. Hearing back from our patients is one way we can continue to improve our services. Please take a few minutes to complete the written survey that you may receive in the mail after you visit with us. Thank you!        Patient Information     Date Of Birth          1940        About your hospital stay     You were admitted on:  May 3, 2018 You last received care in the:  [facility] Phase [facility]    You were discharged on:  May 3, 2018       Who to Call     For medical emergencies, please call 911.  For non-urgent questions about your medical care, please call your primary care provider or clinic, [phone]  For questions related to your surgery, please call your surgery clinic        Attending Provider     Provider Specialty    Chevy Mix MD Ophthalmology       Primary Care Provider Office Phone # Fax #    Weston Raines [phone]      Further instructions from your care team        POST CATARACT SURGERY EYE INSTRUCTIONS  DR. MIX           Eye Medications      If you opted for the one bottle containing all 3 eye medications, follow these instructions.    *Instill one drop into the operative eye 3 times a day until the bottle is empty.       - If your are currently being treated for glaucoma please continue your    medication as normally prescribed.     - Wear eye shield while sleeping for 3 days     - Refrain from heavy lifting, bending, straining, or strenuous activity for 1 week.     - Do not rub or push on the operative eye for 1 week (you may wipe the eye lid(s) gently with a wet wash cloth to remove matter from  eyelashes).     - You may bathe or shower, but do not get the eye wet for 1 month      (swimming, hot tubs or  other water activities).     - Minor itching, scratching sensation, burning sensation, etc. is normal.     Report any severe eye pain or loss of vision.     - Please call our office at [phone] if you have questions or concerns.    To contact a doctor, call number above or after hours call Nurse Advice Line: [phone]    Same-Day Surgery Anesthesia  Adult Discharge Orders & Instructions     For 24 hours after surgery    1. Get plenty of rest.  A responsible adult must stay with you for at least 24 hours after you leave the hospital.   2. Do not drive or use heavy equipment.  If you have weakness or tingling, don't drive or use heavy equipment until this feeling goes away.  3. Do not drink alcohol.  4. Avoid strenuous or risky activities.  Ask for help when climbing stairs.   5. You may feel lightheaded.  IF so, sit for a few minutes before standing.  Have someone help you get up.   6. You may have a slight fever. Call the doctor if your fever is over 100 F (37.7 C) (taken under the tongue) or lasts longer than 24 hours.  7. You may have a dry mouth, a sore throat, muscle aches or trouble sleeping.  These should go away after 24 hours.  8. Do not make important or legal decisions.  Based on the surgery/procedure that you had today, we do not anticipate that you will have any problems.  However, given the various responses that patients can have to the surgical experience, we want to ensure that you have information available to manage pain or nausea and what to do if you observe bleeding or you develop any signs and symptoms of infection:  Methods to control pain include:  Prescription pain medication or over the counter medications as prescribed or suggested by your physician.  In addition, ice packs and periods of rest are often helpful.  If your pain is not managed with the above methods, contact your physician.  Methods to control nausea include:  Anti-nausea medication approved by your physician.   "Drink clear liquids such as apple juice, ginger ale, broth or 7-Up. Be sure to drink enough fluids.  Move to a regular diet as you feel able.  Rest may also help.  Bleeding:  It's not uncommon to see a little blood staining on the dressing, about the size of a quarter in the first 24 hours; if you see this, there is no reason to be alarmed.  However, should this continue to increase in size, apply pressure if able, ant notify your physician.  Infection:  We do not anticipate that you will acquire an infection, but if you should experience any of the following symptoms:  redness, swelling, heat, increasing pain or abnormal drainage at your surgery site, fever or chills, please notify your physician.  Nurse advice line: [phone]        Pending Results     No orders found from 2018 to 2018.            Admission Information     Date & Time Provider Department Dept. Phone    5/3/2018 Chevy Neves MD [facility] Phase [phone]      Your Vitals Were     Blood Pressure Temperature Respirations Height Weight Pulse Oximetry    185/113 97.6  F (36.4  C) 16 1.595 m (5' 2.8\") 66.3 kg (146 lb 2 oz) 98%    BMI (Body Mass Index)                   26.05 kg/m2           MyChart Information     [employer] lets you send messages to your doctor, view your test results, renew your prescriptions, schedule appointments and more. To sign up, go to [email or web address] . Click on \"Log in\" on the left side of the screen, which will take you to the Welcome page. Then click on \"Sign up Now\" on the right side of the page.     You will be asked to enter the access code listed below, as well as some personal information. Please follow the directions to create your username and password.     Your access code is: [email or web address]  Expires: 2018  8:53 AM     Your access code will  in 90 days. If you need help or a new code, please call your [facility] or [phone].        Care EveryWhere ID     This " is your Care EveryWhere ID. This could be used by other organizations to access your [place] medical records  [id number]        Equal Access to Services     KATHY BARRETT : Hadii aad ku hadsultanaraj Mcbride, waalishada lukinjalgerardha, qaitata kaandreasda silvia, jannet noein hayaatracy renotrina henry lachristinetracy arie. So [facility] [phone].    ATENCIÓN: Si habla español, tiene a singh disposición servicios gratuitos de asistencia lingüística. Llame al [phone].    We comply with applicable federal civil rights laws and [place] laws. We do not discriminate on the basis of race, color, national origin, age, disability, sex, sexual orientation, or gender identity.               Review of your medicines      CONTINUE these medicines which have NOT CHANGED        Dose / Directions    amLODIPine 5 MG tablet   Commonly known as:  NORVASC   Used for:  Hypertension goal BP (blood pressure) < 140/90, Hyperlipidemia LDL goal <100        TAKE ONE-HALF TABLET BY MOUTH EVERY DAY   Quantity:  45 tablet   Refills:  2       aspirin 81 MG tablet        Dose:  81 mg   Take 1 tablet (81 mg) by mouth daily   Refills:  0       atenolol 100 MG tablet   Commonly known as:  TENORMIN   Used for:  Hypertension goal BP (blood pressure) < 140/90        Dose:  100 mg   Take 1 tablet (100 mg) by mouth 2 times daily   Quantity:  180 tablet   Refills:  3       losartan 100 MG tablet   Commonly known as:  COZAAR   Used for:  Hypertension goal BP (blood pressure) < 150/90        TAKE ONE TABLET BY MOUTH ONCE DAILY   Quantity:  90 tablet   Refills:  1       simvastatin 20 MG tablet   Commonly known as:  ZOCOR   Used for:  Hyperlipidemia LDL goal <100        Dose:  20 mg   Take 1 tablet (20 mg) by mouth At Bedtime   Quantity:  90 tablet   Refills:  3                Protect others around you: Learn how to safely use, store and throw away your medicines at www.disposemymeds.org.             Medication List: This is a list of all your medications and when to take them. Check  marks below indicate your daily home schedule. Keep this list as a reference.      Medications           Morning Afternoon Evening Bedtime As Needed    amLODIPine 5 MG tablet   Commonly known as:  NORVASC   TAKE ONE-HALF TABLET BY MOUTH EVERY DAY                                aspirin 81 MG tablet   Take 1 tablet (81 mg) by mouth daily                                atenolol 100 MG tablet   Commonly known as:  TENORMIN   Take 1 tablet (100 mg) by mouth 2 times daily                                losartan 100 MG tablet   Commonly known as:  COZAAR   TAKE ONE TABLET BY MOUTH ONCE DAILY                                simvastatin 20 MG tablet   Commonly known as:  ZOCOR   Take 1 tablet (20 mg) by mouth At Bedtime

## 2018-05-03 NOTE — IP AVS SNAPSHOT
[facility] Phase II    [address]     [place] [address]    Phone:  [phone]                                       After Visit Summary   5/3/2018    Keyla Mari    MRN: [id number]           After Visit Summary Signature Page     I have received my discharge instructions, and my questions have been answered. I have discussed any challenges I see with this plan with the nurse or doctor.    ..........................................................................................................................................  Patient/Patient Representative Signature      ..........................................................................................................................................  Patient Representative Print Name and Relationship to Patient    ..................................................               ................................................  Date                                            Time    ..........................................................................................................................................  Reviewed by Signature/Title    ...................................................              ..............................................  Date                                                            Time

## 2018-05-03 NOTE — ANESTHESIA CARE TRANSFER NOTE
Hospital: [facility]  Diagnosis:  TIA  Room Number:  521  Referring MD:  Dr. Bradley  RN:  Feng  Phone Number:  [phone]        Patient: Keyla Mari    Procedure(s):  PHACOEMULSIFICATION WITH STANDARD INTRAOCULAR LENS IMPLANT RIGHT - Wound Class: I-Clean    Diagnosis: cataract  Diagnosis Additional Information: No value filed.    Anesthesia Type:   MAC     Note:  Airway :Room Air  Patient transferred to:Phase II        Vitals: (Last set prior to Anesthesia Care Transfer)    CRNA VITALS  5/3/2018 1213 - 5/3/2018 1250      5/3/2018             Pulse: (!)  47    SpO2: 100 %    Resp Rate (observed): 21                Electronically Signed By: CLEMENCIA Shepard CRNA  May 3, 2018  12:50 PM

## 2018-05-03 NOTE — DISCHARGE INSTRUCTIONS
POST CATARACT SURGERY EYE INSTRUCTIONS  DR. MIX           Eye Medications      If you opted for the one bottle containing all 3 eye medications, follow these instructions.    *Instill one drop into the operative eye 3 times a day until the bottle is empty.       - If your are currently being treated for glaucoma please continue your    medication as normally prescribed.     - Wear eye shield while sleeping for 3 days     - Refrain from heavy lifting, bending, straining, or strenuous activity for 1 week.     - Do not rub or push on the operative eye for 1 week (you may wipe the eye lid(s) gently with a wet wash cloth to remove matter from  eyelashes).     - You may bathe or shower, but do not get the eye wet for 1 month      (swimming, hot tubs or other water activities).     - Minor itching, scratching sensation, burning sensation, etc. is normal.     Report any severe eye pain or loss of vision.     - Please call our office at [phone] if you have questions or concerns.    To contact a doctor, call number above or after hours call Nurse Advice Line: [phone]    Same-Day Surgery Anesthesia  Adult Discharge Orders & Instructions     For 24 hours after surgery    1. Get plenty of rest.  A responsible adult must stay with you for at least 24 hours after you leave the hospital.   2. Do not drive or use heavy equipment.  If you have weakness or tingling, don't drive or use heavy equipment until this feeling goes away.  3. Do not drink alcohol.  4. Avoid strenuous or risky activities.  Ask for help when climbing stairs.   5. You may feel lightheaded.  IF so, sit for a few minutes before standing.  Have someone help you get up.   6. You may have a slight fever. Call the doctor if your fever is over 100 F (37.7 C) (taken under the tongue) or lasts longer than 24 hours.  7. You may have a dry mouth, a sore throat, muscle aches or trouble sleeping.  These should go away after 24 hours.  8. Do not make  important or legal decisions.  Based on the surgery/procedure that you had today, we do not anticipate that you will have any problems.  However, given the various responses that patients can have to the surgical experience, we want to ensure that you have information available to manage pain or nausea and what to do if you observe bleeding or you develop any signs and symptoms of infection:  Methods to control pain include:  Prescription pain medication or over the counter medications as prescribed or suggested by your physician.  In addition, ice packs and periods of rest are often helpful.  If your pain is not managed with the above methods, contact your physician.  Methods to control nausea include:  Anti-nausea medication approved by your physician.  Drink clear liquids such as apple juice, ginger ale, broth or 7-Up. Be sure to drink enough fluids.  Move to a regular diet as you feel able.  Rest may also help.  Bleeding:  It's not uncommon to see a little blood staining on the dressing, about the size of a quarter in the first 24 hours; if you see this, there is no reason to be alarmed.  However, should this continue to increase in size, apply pressure if able, ant notify your physician.  Infection:  We do not anticipate that you will acquire an infection, but if you should experience any of the following symptoms:  redness, swelling, heat, increasing pain or abnormal drainage at your surgery site, fever or chills, please notify your physician.  Nurse advice line: [phone]

## 2018-05-04 NOTE — ANESTHESIA POSTPROCEDURE EVALUATION
Patient: Keyla Mari    Procedure(s):  PHACOEMULSIFICATION WITH STANDARD INTRAOCULAR LENS IMPLANT RIGHT - Wound Class: I-Clean    Diagnosis:cataract  Diagnosis Additional Information: No value filed.    Anesthesia Type:  MAC    Note:  Anesthesia Post Evaluation    Patient location during evaluation: Phase 2  Patient participation: Able to fully participate in evaluation  Level of consciousness: awake  Pain management: adequate  Airway patency: patent  Cardiovascular status: acceptable and hemodynamically stable  Respiratory status: acceptable, room air and nonlabored ventilation  Hydration status: stable  PONV: none     Anesthetic complications: None    Comments: Patient was happy with the anesthesia care received and no anesthesia related complications were noted.  I will follow up with the patient again if it is needed.        Last vitals:  Vitals:    05/03/18 1245 05/03/18 1300 05/03/18 1315   BP: (!) 185/113 (!) 165/103 (!) 148/98   Resp: 16 16 16   Temp:      SpO2:            Electronically Signed By: CLEMENCIA Ann CRNA  May 4, 2018  1:03 PM

## 2018-05-16 ENCOUNTER — ANESTHESIA EVENT (OUTPATIENT)
Dept: SURGERY | Facility: CLINIC | Age: 78
End: 2018-05-16
Payer: MEDICARE

## 2018-05-16 ASSESSMENT — LIFESTYLE VARIABLES: TOBACCO_USE: 1

## 2018-05-17 ENCOUNTER — SURGERY (OUTPATIENT)
Age: 78
End: 2018-05-17

## 2018-05-17 ENCOUNTER — ANESTHESIA (OUTPATIENT)
Dept: SURGERY | Facility: CLINIC | Age: 78
End: 2018-05-17
Payer: MEDICARE

## 2018-05-17 ENCOUNTER — HOSPITAL ENCOUNTER (OUTPATIENT)
Facility: CLINIC | Age: 78
Discharge: HOME | End: 2018-05-17
Attending: OPHTHALMOLOGY | Admitting: OPHTHALMOLOGY
Payer: MEDICARE

## 2018-05-17 VITALS
DIASTOLIC BLOOD PRESSURE: 96 MMHG | OXYGEN SATURATION: 97 % | RESPIRATION RATE: 16 BRPM | TEMPERATURE: 97.8 F | SYSTOLIC BLOOD PRESSURE: 137 MMHG

## 2018-05-17 DIAGNOSIS — H25.12 NUCLEAR SCLEROTIC CATARACT, LEFT: Primary | ICD-10-CM

## 2018-05-17 PROCEDURE — 36000025 ZZH CATARACT SURGICAL PACKAGE: Performed by: OPHTHALMOLOGY

## 2018-05-17 PROCEDURE — 25000125 ZZHC RX 250: Performed by: NURSE ANESTHETIST, CERTIFIED REGISTERED

## 2018-05-17 PROCEDURE — 25000128 H RX IP 250 OP 636: Performed by: OPHTHALMOLOGY

## 2018-05-17 PROCEDURE — 25000128 H RX IP 250 OP 636: Performed by: NURSE ANESTHETIST, CERTIFIED REGISTERED

## 2018-05-17 PROCEDURE — V2632 POST CHMBR INTRAOCULAR LENS: HCPCS | Performed by: OPHTHALMOLOGY

## 2018-05-17 PROCEDURE — 37000012 ZZH ANESTHESIA CATARACT PACKAGE: Performed by: OPHTHALMOLOGY

## 2018-05-17 PROCEDURE — 25000125 ZZHC RX 250: Performed by: OPHTHALMOLOGY

## 2018-05-17 PROCEDURE — 71000022 ZZH RECOVERY CATRACT PACKAGE: Performed by: OPHTHALMOLOGY

## 2018-05-17 DEVICE — EYE IMP IOL AMO PCL TECNIS ZCB00 21.5: Type: IMPLANTABLE DEVICE | Site: EYE | Status: FUNCTIONAL

## 2018-05-17 RX ORDER — ONDANSETRON 4 MG/1
4 TABLET, ORALLY DISINTEGRATING ORAL EVERY 30 MIN PRN
Status: CANCELLED | OUTPATIENT
Start: 2018-05-17

## 2018-05-17 RX ORDER — TROPICAMIDE 10 MG/ML
1 SOLUTION/ DROPS OPHTHALMIC
Status: COMPLETED | OUTPATIENT
Start: 2018-05-17 | End: 2018-05-17

## 2018-05-17 RX ORDER — ONDANSETRON 2 MG/ML
4 INJECTION INTRAMUSCULAR; INTRAVENOUS EVERY 30 MIN PRN
Status: CANCELLED | OUTPATIENT
Start: 2018-05-17

## 2018-05-17 RX ORDER — PHENYLEPHRINE HYDROCHLORIDE 25 MG/ML
1 SOLUTION/ DROPS OPHTHALMIC
Status: COMPLETED | OUTPATIENT
Start: 2018-05-17 | End: 2018-05-17

## 2018-05-17 RX ORDER — LIDOCAINE HYDROCHLORIDE 10 MG/ML
INJECTION, SOLUTION INFILTRATION; PERINEURAL PRN
Status: DISCONTINUED | OUTPATIENT
Start: 2018-05-17 | End: 2018-05-17 | Stop reason: HOSPADM

## 2018-05-17 RX ORDER — PROPARACAINE HYDROCHLORIDE 5 MG/ML
1 SOLUTION/ DROPS OPHTHALMIC ONCE
Status: COMPLETED | OUTPATIENT
Start: 2018-05-17 | End: 2018-05-17

## 2018-05-17 RX ORDER — NALOXONE HYDROCHLORIDE 0.4 MG/ML
.1-.4 INJECTION, SOLUTION INTRAMUSCULAR; INTRAVENOUS; SUBCUTANEOUS
Status: CANCELLED | OUTPATIENT
Start: 2018-05-17 | End: 2018-05-18

## 2018-05-17 RX ORDER — GLYCOPYRROLATE 0.2 MG/ML
INJECTION, SOLUTION INTRAMUSCULAR; INTRAVENOUS PRN
Status: DISCONTINUED | OUTPATIENT
Start: 2018-05-17 | End: 2018-05-17

## 2018-05-17 RX ORDER — KETAMINE HYDROCHLORIDE 10 MG/ML
INJECTION INTRAMUSCULAR; INTRAVENOUS PRN
Status: DISCONTINUED | OUTPATIENT
Start: 2018-05-17 | End: 2018-05-17

## 2018-05-17 RX ORDER — SODIUM CHLORIDE, SODIUM LACTATE, POTASSIUM CHLORIDE, CALCIUM CHLORIDE 600; 310; 30; 20 MG/100ML; MG/100ML; MG/100ML; MG/100ML
INJECTION, SOLUTION INTRAVENOUS CONTINUOUS
Status: CANCELLED | OUTPATIENT
Start: 2018-05-17

## 2018-05-17 RX ORDER — CYCLOPENTOLATE HYDROCHLORIDE 10 MG/ML
1 SOLUTION/ DROPS OPHTHALMIC
Status: COMPLETED | OUTPATIENT
Start: 2018-05-17 | End: 2018-05-17

## 2018-05-17 RX ORDER — PROPARACAINE HYDROCHLORIDE 5 MG/ML
1 SOLUTION/ DROPS OPHTHALMIC ONCE
Status: DISCONTINUED | OUTPATIENT
Start: 2018-05-17 | End: 2018-05-17 | Stop reason: HOSPADM

## 2018-05-17 RX ADMIN — CYCLOPENTOLATE HYDROCHLORIDE 1 DROP: 10 SOLUTION/ DROPS OPHTHALMIC at 11:04

## 2018-05-17 RX ADMIN — LIDOCAINE HYDROCHLORIDE 1 ML: 10 INJECTION, SOLUTION INFILTRATION; PERINEURAL at 12:43

## 2018-05-17 RX ADMIN — MIDAZOLAM 1 MG: 1 INJECTION INTRAMUSCULAR; INTRAVENOUS at 12:35

## 2018-05-17 RX ADMIN — TROPICAMIDE 1 DROP: 10 SOLUTION/ DROPS OPHTHALMIC at 11:12

## 2018-05-17 RX ADMIN — PHENYLEPHRINE HYDROCHLORIDE 1 DROP: 25 SOLUTION/ DROPS OPHTHALMIC at 10:57

## 2018-05-17 RX ADMIN — PHENYLEPHRINE HYDROCHLORIDE 1 DROP: 25 SOLUTION/ DROPS OPHTHALMIC at 11:04

## 2018-05-17 RX ADMIN — CYCLOPENTOLATE HYDROCHLORIDE 1 DROP: 10 SOLUTION/ DROPS OPHTHALMIC at 11:12

## 2018-05-17 RX ADMIN — MIDAZOLAM 0.5 MG: 1 INJECTION INTRAMUSCULAR; INTRAVENOUS at 12:40

## 2018-05-17 RX ADMIN — CYCLOPENTOLATE HYDROCHLORIDE 1 DROP: 10 SOLUTION/ DROPS OPHTHALMIC at 10:57

## 2018-05-17 RX ADMIN — Medication 1.4 ML GIVEN: at 12:44

## 2018-05-17 RX ADMIN — EPINEPHRINE 300 ML: 1 INJECTION, SOLUTION INTRAMUSCULAR; SUBCUTANEOUS at 12:43

## 2018-05-17 RX ADMIN — TROPICAMIDE 1 DROP: 10 SOLUTION/ DROPS OPHTHALMIC at 11:04

## 2018-05-17 RX ADMIN — GLYCOPYRROLATE 0.1 MG: 0.2 INJECTION, SOLUTION INTRAMUSCULAR; INTRAVENOUS at 12:46

## 2018-05-17 RX ADMIN — PROPARACAINE HYDROCHLORIDE 1 DROP: 5 SOLUTION/ DROPS OPHTHALMIC at 10:57

## 2018-05-17 RX ADMIN — PHENYLEPHRINE HYDROCHLORIDE 1 DROP: 25 SOLUTION/ DROPS OPHTHALMIC at 11:12

## 2018-05-17 RX ADMIN — Medication 10 MG: at 12:38

## 2018-05-17 RX ADMIN — TROPICAMIDE 1 DROP: 10 SOLUTION/ DROPS OPHTHALMIC at 10:57

## 2018-05-17 NOTE — ANESTHESIA PREPROCEDURE EVALUATION
Anesthesia Evaluation     . Pt has had prior anesthetic. Type: MAC and General    History of anesthetic complications   - PONV        ROS/MED HX    ENT/Pulmonary:     (+)tobacco use, Past use , . Other pulmonary disease h/o right sided lobectomy.    Neurologic:     (+)CVA date: 2013 without deficitsTIA     Cardiovascular:     (+) Dyslipidemia, hypertension----. : . . . :. . Previous cardiac testing date:results:date:5/12/16 results:. Patient experienced no chest pain throughout the procedure. Patient did have SOB and ST depression Positive stress test suggestive of ischemia date:10/9/17 results:Bradycardia with rate variation date: results:          METS/Exercise Tolerance:     Hematologic:         Musculoskeletal:   (+) arthritis, , , other musculoskeletal- lumbar radiculopathy      GI/Hepatic: Comment: H/O GI bleed - neg GI/hepatic ROS       Renal/Genitourinary:     (+) chronic renal disease, type: CRI, Pt has no history of transplant,       Endo:  - neg endo ROS       Psychiatric:  - neg psychiatric ROS       Infectious Disease:  - neg infectious disease ROS       Malignancy:      - no malignancy   Other:    (+) No chance of pregnancy C-spine cleared: N/A, no H/O Chronic Pain,  - neg other ROS                 Physical Exam  Normal systems: cardiovascular and pulmonary    Airway   Mallampati: II  TM distance: >3 FB  Neck ROM: full    Dental   (+) upper dentures and lower dentures    Cardiovascular   Rhythm and rate: regular and normal      Pulmonary    breath sounds clear to auscultation                    Anesthesia Plan      History & Physical Review  History and physical reviewed and following examination; no interval change.    ASA Status:  3 .    NPO Status:  > 8 hours    Plan for MAC Reason for MAC:  Deep or markedly invasive procedure (G8)         Postoperative Care  Postoperative pain management:  IV analgesics.      Consents  Anesthetic plan, risks, benefits and alternatives discussed with:   Patient.  Use of blood products discussed: No .   .                          .

## 2018-05-17 NOTE — ANESTHESIA POSTPROCEDURE EVALUATION
Patient: Keyla Mari    Procedure(s):  PHACOEMULSIFICATION WITH STANDARD INTRAOCULAR LENS IMPLANT LEFT EYE - Wound Class: I-Clean    Diagnosis:nuclear cataract  Diagnosis Additional Information: No value filed.    Anesthesia Type:  MAC    Note:  Anesthesia Post Evaluation    Patient location during evaluation: Phase 2 and Bedside  Patient participation: Able to fully participate in evaluation  Level of consciousness: awake  Pain management: adequate  Airway patency: patent  Cardiovascular status: acceptable and hemodynamically stable  Respiratory status: acceptable, room air and nonlabored ventilation  Hydration status: stable  PONV: none     Anesthetic complications: None    Comments: Patient was happy with the anesthesia care received and no anesthesia related complications were noted.  I will follow up with the patient again if it is needed.        Last vitals:  Vitals:    05/17/18 1102   BP: (!) 184/92   Resp: 20   Temp: 97.8  F (36.6  C)   SpO2: 97%         Electronically Signed By: CLEMENCIA Velasquez CRNA  May 17, 2018  1:06 PM

## 2018-05-17 NOTE — ANESTHESIA CARE TRANSFER NOTE
Patient: Keyla Mari    Procedure(s):  PHACOEMULSIFICATION WITH STANDARD INTRAOCULAR LENS IMPLANT LEFT EYE - Wound Class: I-Clean    Diagnosis: nuclear cataract  Diagnosis Additional Information: No value filed.    Anesthesia Type:   MAC     Note:  Airway :Room Air  Patient transferred to:Phase II  Handoff Report: Identifed the Patient, Identified the Reponsible Provider, Reviewed the pertinent medical history, Discussed the surgical course, Reviewed Intra-OP anesthesia mangement and issues during anesthesia, Set expectations for post-procedure period and Allowed opportunity for questions and acknowledgement of understanding      Vitals: (Last set prior to Anesthesia Care Transfer)    CRNA VITALS  5/17/2018 1232 - 5/17/2018 1305      5/17/2018             Pulse: 53    SpO2: 100 %    Resp Rate (observed): 22                Electronically Signed By: CLEMENCIA Velasquez CRNA  May 17, 2018  1:05 PM

## 2018-05-17 NOTE — DISCHARGE INSTRUCTIONS
POST CATARACT SURGERY EYE INSTRUCTIONS  DR. MIX           Eye Medications    VIGAMOX/OFLOXACIN (Tan Cap)    KETOROLAC (Segura Cap)    PREDNISOLONE (Pink or White Cap)    If you opted for the individual bottles of eye medications follow these instructions.    *Instill one drop from each bottle into the operative eye 3 times a day until each  bottle is empty.    *Wait 5 minutes between each drop.    If you opted for the one bottle containing all 3 eye medications, follow these instructions.    *Instill one drop into the operative eye 3 times a day until the bottle is empty.       - If your are currently being treated for glaucoma please continue your    medication as normally prescribed.     - Wear eye shield while sleeping for 3 days     - Refrain from heavy lifting, bending, straining, or strenuous activity for 1      week.     - Do not rub or push on the operative eye for 1 week (you may wipe the    eye lid(s) gently with a wet wash cloth to remove matter from                         eyelashes).     - You may bathe or shower, but do not get the eye wet for 1 month      (swimming, hot tubs or other water activities).     - Minor itching, scratching sensation, burning sensation, etc. is normal.     Report any severe eye pain or loss of vision.     - Please call our office at [phone] if you have questions or     concerns.  [facility]   Adult Discharge Orders & Instructions     For 24 hours after surgery    1. Get plenty of rest.  A responsible adult must stay with you for at least 24 hours after you leave the hospital.   2. Do not drive or use heavy equipment.  If you have weakness or tingling, don't drive or use heavy equipment until this feeling goes away.  3. Do not drink alcohol.  4. Avoid strenuous or risky activities.  Ask for help when climbing stairs.   5. You may feel lightheaded.  IF so, sit for a few minutes before standing.  Have someone help you get up.   6. If you have nausea  (feel sick to your stomach): Drink only clear liquids such as apple juice, ginger ale, broth or 7-Up.  Rest may also help.  Be sure to drink enough fluids.  Move to a regular diet as you feel able.  7. You may have a slight fever. Call the doctor if your fever is over 100 F (37.7 C) (taken under the tongue) or lasts longer than 24 hours.  8. You may have a dry mouth, a sore throat, muscle aches or trouble sleeping.  These should go away after 24 hours.  9. Do not make important or legal decisions.   Call your doctor for any of the followin.  Signs of infection (fever, growing tenderness at the surgery site, a large amount of drainage or bleeding, severe pain, foul-smelling drainage, redness, swelling).    2. It has been over 8 to 10 hours since surgery and you are still not able to urinate (pass water).    3.  Headache for over 24 hours.    4.  Numbness, tingling or weakness the day after surgery (if you had spinal anesthesia).  To contact a doctor, call ________________________________________

## 2018-05-17 NOTE — IP AVS SNAPSHOT
[facility] Phase II    [address]     [place] [address]    Phone:  [phone]                                       After Visit Summary   5/17/2018    Keyla Mari    MRN: [id number]           After Visit Summary Signature Page     I have received my discharge instructions, and my questions have been answered. I have discussed any challenges I see with this plan with the nurse or doctor.    ..........................................................................................................................................  Patient/Patient Representative Signature      ..........................................................................................................................................  Patient Representative Print Name and Relationship to Patient    ..................................................               ................................................  Date                                            Time    ..........................................................................................................................................  Reviewed by Signature/Title    ...................................................              ..............................................  Date                                                            Time

## 2018-05-17 NOTE — IP AVS SNAPSHOT
MRN:[id number]                      After Visit Summary   5/17/2018    Keyla Mari    MRN: [id number]           Thank you!     Thank you for choosing [facility] for your care. Our goal is always to provide you with excellent care. Hearing back from our patients is one way we can continue to improve our services. Please take a few minutes to complete the written survey that you may receive in the mail after you visit with us. Thank you!        Patient Information     Date Of Birth          1940        About your hospital stay     You were admitted on:  May 17, 2018 You last received care in the:  [facility] Phase II    You were discharged on:  May 17, 2018       Who to Call     For medical emergencies, please call 911.  For non-urgent questions about your medical care, please call your primary care provider or clinic, [phone]  For questions related to your surgery, please call your surgery clinic        Attending Provider     Provider Specialty    Chevy Mix MD Ophthalmology       Primary Care Provider Office Phone # Fax #    Weston Raines [phone]      Further instructions from your care team        POST CATARACT SURGERY EYE INSTRUCTIONS  DR. MIX           Eye Medications    VIGAMOX/OFLOXACIN (Tan Cap)    KETOROLAC (Segura Cap)    PREDNISOLONE (Pink or White Cap)    If you opted for the individual bottles of eye medications follow these instructions.    *Instill one drop from each bottle into the operative eye 3 times a day until each  bottle is empty.    *Wait 5 minutes between each drop.    If you opted for the one bottle containing all 3 eye medications, follow these instructions.    *Instill one drop into the operative eye 3 times a day until the bottle is empty.       - If your are currently being treated for glaucoma please continue your    medication as normally prescribed.     - Wear eye shield while sleeping for 3 days     - Refrain  from heavy lifting, bending, straining, or strenuous activity for 1      week.     - Do not rub or push on the operative eye for 1 week (you may wipe the    eye lid(s) gently with a wet wash cloth to remove matter from                         eyelashes).     - You may bathe or shower, but do not get the eye wet for 1 month      (swimming, hot tubs or other water activities).     - Minor itching, scratching sensation, burning sensation, etc. is normal.     Report any severe eye pain or loss of vision.     - Please call our office at [phone] if you have questions or     concerns.  [facility]   Adult Discharge Orders & Instructions     For 24 hours after surgery    1. Get plenty of rest.  A responsible adult must stay with you for at least 24 hours after you leave the hospital.   2. Do not drive or use heavy equipment.  If you have weakness or tingling, don't drive or use heavy equipment until this feeling goes away.  3. Do not drink alcohol.  4. Avoid strenuous or risky activities.  Ask for help when climbing stairs.   5. You may feel lightheaded.  IF so, sit for a few minutes before standing.  Have someone help you get up.   6. If you have nausea (feel sick to your stomach): Drink only clear liquids such as apple juice, ginger ale, broth or 7-Up.  Rest may also help.  Be sure to drink enough fluids.  Move to a regular diet as you feel able.  7. You may have a slight fever. Call the doctor if your fever is over 100 F (37.7 C) (taken under the tongue) or lasts longer than 24 hours.  8. You may have a dry mouth, a sore throat, muscle aches or trouble sleeping.  These should go away after 24 hours.  9. Do not make important or legal decisions.   Call your doctor for any of the followin.  Signs of infection (fever, growing tenderness at the surgery site, a large amount of drainage or bleeding, severe pain, foul-smelling drainage, redness, swelling).    2. It has been over 8 to 10 hours since  "surgery and you are still not able to urinate (pass water).    3.  Headache for over 24 hours.    4.  Numbness, tingling or weakness the day after surgery (if you had spinal anesthesia).  To contact a doctor, call ________________________________________    Pending Results     No orders found from 5/15/2018 to 2018.            Admission Information     Date & Time Provider Department Dept. Phone    2018 Chevy Neves MD [facility] Phase [phone]      Your Vitals Were     Blood Pressure Temperature Respirations Pulse Oximetry          164/82 97.8  F (36.6  C) (Oral) 18 98%        MyChart Information     [employer] lets you send messages to your doctor, view your test results, renew your prescriptions, schedule appointments and more. To sign up, go to [email or web address] . Click on \"Log in\" on the left side of the screen, which will take you to the Welcome page. Then click on \"Sign up Now\" on the right side of the page.     You will be asked to enter the access code listed below, as well as some personal information. Please follow the directions to create your username and password.     Your access code is: [email or web address]  Expires: 2018  8:53 AM     Your access code will  in 90 days. If you need help or a new code, please call your [facility] clinic or [phone].        Care EveryWhere ID     This is your Care EveryWhere ID. This could be used by other organizations to access your [facility] medical records  [id number]        Equal Access to Services     [facility] NENA : Preethi Mcbride, arely mancia, jannet ortega. So [facility] [phone].    ATENCIÓN: Si habla español, tiene a singh disposición servicios gratuitos de asistencia lingüística. Llame al [phone].    We comply with applicable federal civil rights laws and [place] laws. We do not discriminate on the basis of race, color, national origin, age, " disability, sex, sexual orientation, or gender identity.               Review of your medicines      CONTINUE these medicines which have NOT CHANGED        Dose / Directions    amLODIPine 5 MG tablet   Commonly known as:  NORVASC   Used for:  Hypertension goal BP (blood pressure) < 140/90, Hyperlipidemia LDL goal <100        TAKE ONE-HALF TABLET BY MOUTH EVERY DAY   Quantity:  45 tablet   Refills:  2       aspirin 81 MG tablet        Dose:  81 mg   Take 1 tablet (81 mg) by mouth daily   Refills:  0       atenolol 100 MG tablet   Commonly known as:  TENORMIN   Used for:  Hypertension goal BP (blood pressure) < 140/90        Dose:  100 mg   Take 1 tablet (100 mg) by mouth 2 times daily   Quantity:  180 tablet   Refills:  3       losartan 100 MG tablet   Commonly known as:  COZAAR   Used for:  Hypertension goal BP (blood pressure) < 150/90        TAKE ONE TABLET BY MOUTH ONCE DAILY   Quantity:  90 tablet   Refills:  1       simvastatin 20 MG tablet   Commonly known as:  ZOCOR   Used for:  Hyperlipidemia LDL goal <100        Dose:  20 mg   Take 1 tablet (20 mg) by mouth At Bedtime   Quantity:  90 tablet   Refills:  3                Protect others around you: Learn how to safely use, store and throw away your medicines at www.disposemymeds.org.             Medication List: This is a list of all your medications and when to take them. Check marks below indicate your daily home schedule. Keep this list as a reference.      Medications           Morning Afternoon Evening Bedtime As Needed    amLODIPine 5 MG tablet   Commonly known as:  NORVASC   TAKE ONE-HALF TABLET BY MOUTH EVERY DAY                                aspirin 81 MG tablet   Take 1 tablet (81 mg) by mouth daily                                atenolol 100 MG tablet   Commonly known as:  TENORMIN   Take 1 tablet (100 mg) by mouth 2 times daily                                losartan 100 MG tablet   Commonly known as:  COZAAR   TAKE ONE TABLET BY MOUTH ONCE DAILY                                 simvastatin 20 MG tablet   Commonly known as:  ZOCOR   Take 1 tablet (20 mg) by mouth At Bedtime

## 2018-07-20 ENCOUNTER — OFFICE VISIT (OUTPATIENT)
Dept: URGENT CARE | Facility: RETAIL CLINIC | Age: 78
End: 2018-07-20
Payer: COMMERCIAL

## 2018-07-20 VITALS
SYSTOLIC BLOOD PRESSURE: 156 MMHG | HEART RATE: 57 BPM | DIASTOLIC BLOOD PRESSURE: 77 MMHG | TEMPERATURE: 97.5 F | OXYGEN SATURATION: 98 %

## 2018-07-20 DIAGNOSIS — M79.89 SWELLING OF RIGHT LOWER EXTREMITY: Primary | ICD-10-CM

## 2018-07-20 PROCEDURE — 99213 OFFICE O/P EST LOW 20 MIN: CPT | Performed by: INTERNAL MEDICINE

## 2018-07-20 NOTE — MR AVS SNAPSHOT
"              After Visit Summary   2018    Keyla Mari    MRN: [id number]           Patient Information     Date Of Birth          1940        Visit Information        Provider Department      2018 10:00 AM Duy Campos MD [facility]        Today's Diagnoses     Swelling of right lower extremity    -  1       Follow-ups after your visit        Who to contact     You can reach your care team any time of the day by calling [phone].  Notification of test results:  If you have an abnormal lab result, we will notify you by phone as soon as possible.         Additional Information About Your Visit        MyChart Information     WeStore lets you send messages to your doctor, view your test results, renew your prescriptions, schedule appointments and more. To sign up, go to [email or web address] . Click on \"Log in\" on the left side of the screen, which will take you to the Welcome page. Then click on \"Sign up Now\" on the right side of the page.     You will be asked to enter the access code listed below, as well as some personal information. Please follow the directions to create your username and password.     Your access code is: [email or web address]  Expires: 2018  8:53 AM     Your access code will  in 90 days. If you need help or a new code, please call your [place] clinic or [phone].        Care EveryWhere ID     This is your Care EveryWhere ID. This could be used by other organizations to access your [place] medical records  [id number]        Your Vitals Were     Pulse Temperature Pulse Oximetry             57 97.5  F (36.4  C) (Oral) 98%          Blood Pressure from Last 3 Encounters:   18 156/77   18 (!) 137/96   18 (!) 148/98    Weight from Last 3 Encounters:   18 146 lb 2 oz (66.3 kg)   18 146 lb 2 oz (66.3 kg)   17 152 lb (68.9 kg)              Today, you had the following     No orders found for display       " Primary Care Provider Office Phone # Fax #    Weston Raines [phone] [address] [place] [address]        Equal Access to Services     DEVIKADANNIE NENA : Hadzahra trinh alicea anelo Sodavianali, waaxda luqadaha, qaybta kaalmada adejerardoda, jannet castrotracy arie. So [facility] [phone].    ATENCIÓN: Si habla español, tiene a singh disposición servicios gratuitos de asistencia lingüística. Llame al [phone].    We comply with applicable federal civil rights laws and [place] laws. We do not discriminate on the basis of race, color, national origin, age, disability, sex, sexual orientation, or gender identity.            Thank you!     Thank you for choosing [facility]  for your care. Our goal is always to provide you with excellent care. Hearing back from our patients is one way we can continue to improve our services. Please take a few minutes to complete the written survey that you may receive in the mail after your visit with us. Thank you!             Your Updated Medication List - Protect others around you: Learn how to safely use, store and throw away your medicines at www.disposemymeds.org.          This list is accurate as of 7/20/18  2:57 PM.  Always use your most recent med list.                   Brand Name Dispense Instructions for use Diagnosis    amLODIPine 5 MG tablet    NORVASC    45 tablet    TAKE ONE-HALF TABLET BY MOUTH EVERY DAY    Hypertension goal BP (blood pressure) < 140/90, Hyperlipidemia LDL goal <100       aspirin 81 MG tablet      Take 1 tablet (81 mg) by mouth daily        atenolol 100 MG tablet    TENORMIN    180 tablet    Take 1 tablet (100 mg) by mouth 2 times daily    Hypertension goal BP (blood pressure) < 140/90       losartan 100 MG tablet    COZAAR    90 tablet    TAKE ONE TABLET BY MOUTH ONCE DAILY    Hypertension goal BP (blood pressure) < 150/90       simvastatin 20 MG tablet    ZOCOR    90 tablet    Take 1 tablet (20  mg) by mouth At Bedtime    Hyperlipidemia LDL goal <100

## 2018-07-20 NOTE — PROGRESS NOTES
[facility] express Care Progress Note        Duy Campos MD, MPH  07/20/2018        History:      A pleasant 78 year old year old female is seen for right lower leg, ankle and foot swelling and pain since a week ago. However her symptoms have exacerbated since yesterday.No falling episode or trauma is referred.No recent surgery,travel or immobilization is referred. No smoking Hx. No chest pain or dyspnea is referred. She refers to Hx of gout.         Assessment and Plan:        Right lower leg swelling as well as right ankle and foot swelling and pain (non- traumatic):  She is directed to [facility] ER now for further evaluation and management. She is stable. She agrees w this plan. I discussed w the patient the importance of ruling out DVT and its potentially life threatening nature and other entities including gout flare up and she expresses understanding.                         Physical Exam:      /77  Pulse 57  Temp 97.5  F (36.4  C) (Oral)  SpO2 98%     Constitutional: Patient is in no distress The patient is pleasant and cooperative.   HEENT: Head:  Head is atraumatic, normocephalic.    Eyes: Pupils are equal, round and reactive to light and accomodation.  Sclera is non-icteric. No conjunctival injection, or exudate noted. Extraocular motion is intact. Visual acuity is intact bilaterally.  Ears:  External acoustic canals are patent and clear.  There is no erythema and bulging( exudate)  of the ( R/L ) tympanic membrane(s ).   Nose:  No Nasal congestion w/o drainage or mucosal ulceration is noted.  Throat:  Oral mucosa is moist.  No oral lesions are noted.  No posterior pharyngeal hyperemia or exudate noted.     Neck Supple.  There is no cervical lymphadenopathy.  No nuchal rigidity noted.  There is no meningismus.     Cardiovascular: Heart is regular to rate and rhythm.  No murmur is noted.     Chest. Chest Symmetrical, no soft tissues, swelling, or tenderness upon palpation    Lungs: Clear in the anterior and posterior pulmonary fields.   Abdomen: Soft and non-tender.    Back No flank tenderness is noted.   Extremeties Right lower leg ( Mild right calf swelling) as well as right ankle ( medial and lateral malleoli) and foot swelling ,erythema and pain with flexion and extension . She experiences right ankle and foot pain when bearing weight on the right foot but is able to ambulate on right foot. Peripheral pulses are present. No neurovascular compromise of the right lower extremity is noted.   Neuro: No focal deficit.   Skin No petechiae or purpura is noted.  There is no rash.   Mood Normal              Medications:        PRN Meds:          Data:      All new lab and imaging data was reviewed.   Results for orders placed or performed in visit on 04/26/18   Basic metabolic panel  (Ca, Cl, CO2, Creat, Gluc, K, Na, BUN)   Result Value Ref Range    Sodium 139 133 - 144 mmol/L    Potassium 4.3 3.4 - 5.3 mmol/L    Chloride 104 94 - 109 mmol/L    Carbon Dioxide 28 20 - 32 mmol/L    Anion Gap 7 3 - 14 mmol/L    Glucose 114 (H) 70 - 99 mg/dL    Urea Nitrogen 9 7 - 30 mg/dL    Creatinine 0.94 0.52 - 1.04 mg/dL    GFR Estimate 57 (L) >60 mL/min/1.7m2    GFR Estimate If Black 69 >60 mL/min/1.7m2    Calcium 8.6 8.5 - 10.1 mg/dL   Lipid panel reflex to direct LDL Fasting   Result Value Ref Range    Cholesterol 185 <200 mg/dL    Triglycerides 105 <150 mg/dL    HDL Cholesterol 48 (L) >49 mg/dL    LDL Cholesterol Calculated 116 (H) <100 mg/dL    Non HDL Cholesterol 137 (H) <130 mg/dL   ALT   Result Value Ref Range    ALT 21 0 - 50 U/L   Albumin Random Urine Quantitative with Creat Ratio   Result Value Ref Range    Creatinine Urine 100 mg/dL    Albumin Urine mg/L 27 mg/L    Albumin Urine mg/g Cr 27.40 (H) 0 - 25 mg/g Cr

## 2018-07-20 NOTE — PROGRESS NOTES
A pleasant 78 year old  female is seen for right lower leg ,ankle and foot swelling and pain since a week ago and exacerbated since yesterday. No chest pain or dyspnea is referred. No trauma or falling episode is referred. She is hemodynamically stable and no respiratory distress is noted. She is directed to [facility] ER for evaluation now. She agrees w this plan.

## 2018-07-31 DIAGNOSIS — I10 HYPERTENSION GOAL BP (BLOOD PRESSURE) < 150/90: ICD-10-CM

## 2018-07-31 NOTE — TELEPHONE ENCOUNTER
"Requested Prescriptions   Pending Prescriptions Disp Refills     losartan (COZAAR) 100 MG tablet [Pharmacy Med Name: LOSARTAN 100MG TABS] 90 tablet 1    Last Written Prescription Date:  1/30/18  Last Fill Quantity: 90,  # refills: 1   Last office visit: 4/26/2018 with prescribing provider:  4/26/18   Future Office Visit:     Sig: TAKE ONE TABLET BY MOUTH ONCE DAILY    Angiotensin-II Receptors Failed    7/31/2018 12:46 PM       Failed - Blood pressure under 140/90 in past 12 months    BP Readings from Last 3 Encounters:   07/20/18 156/77   05/17/18 (!) 137/96   05/03/18 (!) 148/98                Passed - Recent (12 mo) or future (30 days) visit within the authorizing provider's specialty    Patient had office visit in the last 12 months or has a visit in the next 30 days with authorizing provider or within the authorizing provider's specialty.  See \"Patient Info\" tab in inbasket, or \"Choose Columns\" in Meds & Orders section of the refill encounter.           Passed - Patient is age 18 or older       Passed - No active pregnancy on record       Passed - Normal serum creatinine on file in past 12 months    Recent Labs   Lab Test  04/26/18   0812   CR  0.94            Passed - Normal serum potassium on file in past 12 months    Recent Labs   Lab Test  04/26/18   0812   POTASSIUM  4.3                   Passed - No positive pregnancy test in past 12 months          " Alert-The patient is alert, awake and responds to voice. The patient is oriented to time, place, and person. The triage nurse is able to obtain subjective information.

## 2018-08-01 RX ORDER — LOSARTAN POTASSIUM 100 MG/1
TABLET ORAL
Qty: 90 TABLET | Refills: 1 | Status: SHIPPED | OUTPATIENT
Start: 2018-08-01 | End: 2019-01-30

## 2018-09-24 ENCOUNTER — TELEPHONE (OUTPATIENT)
Dept: FAMILY MEDICINE | Facility: CLINIC | Age: 78
End: 2018-09-24

## 2018-09-24 NOTE — TELEPHONE ENCOUNTER
Panel Management Review      Patient has the following on her problem list:     Hypertension   Last three blood pressure readings:  BP Readings from Last 3 Encounters:   07/20/18 156/77   05/17/18 (!) 137/96   05/03/18 (!) 148/98     Blood pressure: FAILED    HTN Guidelines:  Age 18-59 BP range:  Less than 140/90  Age 60-85 with Diabetes:  Less than 140/90  Age 60-85 without Diabetes:  less than 150/90      Composite cancer screening  Chart review shows that this patient is due/due soon for the following None  Summary:    Patient is due/failing the following:   BP CHECK    Action needed:   Patient needs nurse only appointment.    Type of outreach:    Phone, spoke to patient.  she will come in tomorrow    Questions for provider review:    None                                                                                                                                    Norma Kim, [facility]       Chart routed to none .

## 2018-09-25 ENCOUNTER — ALLIED HEALTH/NURSE VISIT (OUTPATIENT)
Dept: FAMILY MEDICINE | Facility: CLINIC | Age: 78
End: 2018-09-25
Payer: COMMERCIAL

## 2018-09-25 VITALS — HEART RATE: 60 BPM | DIASTOLIC BLOOD PRESSURE: 80 MMHG | SYSTOLIC BLOOD PRESSURE: 160 MMHG

## 2018-09-25 DIAGNOSIS — I10 HYPERTENSION GOAL BP (BLOOD PRESSURE) < 150/90: Primary | ICD-10-CM

## 2018-09-25 PROCEDURE — 99207 ZZC NO CHARGE NURSE ONLY: CPT

## 2018-09-25 NOTE — MR AVS SNAPSHOT
"              After Visit Summary   2018    Keyla Mari    MRN: [id number]           Patient Information     Date Of Birth          1940        Visit Information        Provider Department      2018 1:15 PM CASA HARRIS MA [facility]        Today's Diagnoses     Hypertension goal BP (blood pressure) < 150/90    -  1       Follow-ups after your visit        Who to contact     If you have questions or need follow up information about today's clinic visit or your schedule please contact [facility] directly at [phone].  Normal or non-critical lab and imaging results will be communicated to you by ReviewProhart, letter or phone within 4 business days after the clinic has received the results. If you do not hear from us within 7 days, please contact the clinic through [facility] or phone. If you have a critical or abnormal lab result, we will notify you by phone as soon as possible.  Submit refill requests through [employer] or call your pharmacy and they will forward the refill request to us. Please allow 3 business days for your refill to be completed.          Additional Information About Your Visit        MyChart Information     [employer] lets you send messages to your doctor, view your test results, renew your prescriptions, schedule appointments and more. To sign up, go to [email or web address] . Click on \"Log in\" on the left side of the screen, which will take you to the Welcome page. Then click on \"Sign up Now\" on the right side of the page.     You will be asked to enter the access code listed below, as well as some personal information. Please follow the directions to create your username and password.     Your access code is: [email or web address]  Expires: 2018  2:00 PM     Your access code will  in 90 days. If you need help or a new code, please call your [facility] or [phone].        Care EveryWhere ID     This is your Care EveryWhere ID. This could be " used by other organizations to access your [place] medical records  [id number]        Your Vitals Were     Pulse                   60            Blood Pressure from Last 3 Encounters:   09/25/18 160/80   07/20/18 156/77   05/17/18 (!) 137/96    Weight from Last 3 Encounters:   05/03/18 146 lb 2 oz (66.3 kg)   04/26/18 146 lb 2 oz (66.3 kg)   11/03/17 152 lb (68.9 kg)              Today, you had the following     No orders found for display       Primary Care Provider Office Phone # Fax #    Weston Raines [phone] [address] [place] [address]        Equal Access to Services     KATHY BARRETT : Hadii aad ku hadasho Sopablo, waaxda luqadaha, qaybta kaalmada adeegyada, jannet sargent. So [facility] [phone].    ATENCIÓN: Si habla español, tiene a singh disposición servicios gratuitos de asistencia lingüística. Llame al [phone].    We comply with applicable federal civil rights laws and [place] laws. We do not discriminate on the basis of race, color, national origin, age, disability, sex, sexual orientation, or gender identity.            Thank you!     Thank you for choosing [facility]  for your care. Our goal is always to provide you with excellent care. Hearing back from our patients is one way we can continue to improve our services. Please take a few minutes to complete the written survey that you may receive in the mail after your visit with us. Thank you!             Your Updated Medication List - Protect others around you: Learn how to safely use, store and throw away your medicines at www.disposemymeds.org.          This list is accurate as of 9/25/18  2:00 PM.  Always use your most recent med list.                   Brand Name Dispense Instructions for use Diagnosis    amLODIPine 5 MG tablet    NORVASC    45 tablet    TAKE ONE-HALF TABLET BY MOUTH EVERY DAY    Hypertension goal BP (blood pressure) < 140/90, Hyperlipidemia LDL  goal <100       aspirin 81 MG tablet      Take 1 tablet (81 mg) by mouth daily        atenolol 100 MG tablet    TENORMIN    180 tablet    Take 1 tablet (100 mg) by mouth 2 times daily    Hypertension goal BP (blood pressure) < 140/90       losartan 100 MG tablet    COZAAR    90 tablet    TAKE ONE TABLET BY MOUTH ONCE DAILY    Hypertension goal BP (blood pressure) < 150/90       simvastatin 20 MG tablet    ZOCOR    90 tablet    Take 1 tablet (20 mg) by mouth At Bedtime    Hyperlipidemia LDL goal <100

## 2018-09-25 NOTE — NURSING NOTE
Keyla Mari is a 78 year old patient who comes in today for a Blood Pressure check.  Initial BP:  /80 (BP Location: Right arm, Patient Position: Sitting, Cuff Size: Adult Regular)  Pulse 60     60  Disposition: provider notified while patient in the clinic and he is okay with results and patient left.  Kaylie Nieves MA 9/25/2018

## 2018-10-23 DIAGNOSIS — E78.5 HYPERLIPIDEMIA LDL GOAL <100: ICD-10-CM

## 2018-10-24 RX ORDER — SIMVASTATIN 20 MG
TABLET ORAL
Qty: 90 TABLET | Refills: 3 | Status: SHIPPED | OUTPATIENT
Start: 2018-10-24 | End: 2019-10-09

## 2018-10-24 NOTE — TELEPHONE ENCOUNTER
"Last Written Prescription Date:  8/7/17  Last Fill Quantity: 90,  # refills: 3   Last office visit: 4/26/2018 with prescribing provider:  Weston Raines   Future Office Visit:      Requested Prescriptions   Pending Prescriptions Disp Refills     simvastatin (ZOCOR) 20 MG tablet [Pharmacy Med Name: SIMVASTATIN 20MG TABS] 90 tablet 3     Sig: TAKE ONE TABLET BY MOUTH AT BEDTIME    Statins Protocol Passed    10/23/2018  4:48 PM       Passed - LDL on file in past 12 months    Recent Labs   Lab Test  04/26/18   0812   LDL  116*            Passed - No abnormal creatine kinase in past 12 months    No lab results found.            Passed - Recent (12 mo) or future (30 days) visit within the authorizing provider's specialty    Patient had office visit in the last 12 months or has a visit in the next 30 days with authorizing provider or within the authorizing provider's specialty.  See \"Patient Info\" tab in inbasket, or \"Choose Columns\" in Meds & Orders section of the refill encounter.             Passed - Patient is age 18 or older       Passed - No active pregnancy on record       Passed - No positive pregnancy test in past 12 months        Prescription approved per [facility].    Kay Cash RN    "

## 2018-10-30 ENCOUNTER — ALLIED HEALTH/NURSE VISIT (OUTPATIENT)
Dept: URGENT CARE | Facility: RETAIL CLINIC | Age: 78
End: 2018-10-30
Payer: COMMERCIAL

## 2018-10-30 DIAGNOSIS — Z23 NEED FOR PROPHYLACTIC VACCINATION AND INOCULATION AGAINST INFLUENZA: Primary | ICD-10-CM

## 2018-10-30 DIAGNOSIS — I10 HYPERTENSION GOAL BP (BLOOD PRESSURE) < 140/90: ICD-10-CM

## 2018-10-30 DIAGNOSIS — E78.5 HYPERLIPIDEMIA LDL GOAL <100: ICD-10-CM

## 2018-10-30 PROCEDURE — 99207 ZZC NO CHARGE NURSE ONLY: CPT | Performed by: PHYSICIAN ASSISTANT

## 2018-10-30 PROCEDURE — G0008 ADMIN INFLUENZA VIRUS VAC: HCPCS | Performed by: PHYSICIAN ASSISTANT

## 2018-10-30 PROCEDURE — 90662 IIV NO PRSV INCREASED AG IM: CPT | Performed by: PHYSICIAN ASSISTANT

## 2018-10-30 NOTE — MR AVS SNAPSHOT
"              After Visit Summary   10/30/2018    Keyla Mari    MRN: [id number]           Patient Information     Date Of Birth          1940        Visit Information        Provider Department      10/30/2018 2:00 PM Tabitha Moreira PA-C [facility]        Today's Diagnoses     Need for prophylactic vaccination and inoculation against influenza    -  1       Follow-ups after your visit        Who to contact     You can reach your care team any time of the day by calling [phone].  Notification of test results:  If you have an abnormal lab result, we will notify you by phone as soon as possible.         Additional Information About Your Visit        MyChart Information     [employer] lets you send messages to your doctor, view your test results, renew your prescriptions, schedule appointments and more. To sign up, go to [email or web address] . Click on \"Log in\" on the left side of the screen, which will take you to the Welcome page. Then click on \"Sign up Now\" on the right side of the page.     You will be asked to enter the access code listed below, as well as some personal information. Please follow the directions to create your username and password.     Your access code is: [email or web address]  Expires: 2018  2:00 PM     Your access code will  in 90 days. If you need help or a new code, please call your [place] clinic or [phone].        Care EveryWhere ID     This is your [facility] EveryWhere ID. This could be used by other organizations to access your [place] medical records  [id number]         Blood Pressure from Last 3 Encounters:   18 160/80   18 156/77   18 (!) 137/96    Weight from Last 3 Encounters:   18 146 lb 2 oz (66.3 kg)   18 146 lb 2 oz (66.3 kg)   17 152 lb (68.9 kg)              We Performed the Following     ADMIN INFLUENZA (For MEDICARE Patients ONLY) []     FLU VACCINE, INCREASED ANTIGEN, PRESV FREE, AGE 65+ " [51945]        Primary Care Provider Office Phone # Fax #    Weston Raines [phone] [address] [place] [address]        Equal Access to Services     KATHY BARRETT : Hadii trinh ku hadsultanao Soomaali, waaxda luqadaha, qaybta kaalmada adeegyada, jannet castrotracy arie. So [facility] [phone].    ATENCIÓN: Si habla español, tiene a singh disposición servicios gratuitos de asistencia lingüística. Llame al [phone].    We comply with applicable federal civil rights laws and [place] laws. We do not discriminate on the basis of race, color, national origin, age, disability, sex, sexual orientation, or gender identity.            Thank you!     Thank you for choosing [facility]  for your care. Our goal is always to provide you with excellent care. Hearing back from our patients is one way we can continue to improve our services. Please take a few minutes to complete the written survey that you may receive in the mail after your visit with us. Thank you!             Your Updated Medication List - Protect others around you: Learn how to safely use, store and throw away your medicines at www.disposemymeds.org.          This list is accurate as of 10/30/18  2:09 PM.  Always use your most recent med list.                   Brand Name Dispense Instructions for use Diagnosis    amLODIPine 5 MG tablet    NORVASC    45 tablet    TAKE ONE-HALF TABLET BY MOUTH EVERY DAY    Hypertension goal BP (blood pressure) < 140/90, Hyperlipidemia LDL goal <100       aspirin 81 MG tablet      Take 1 tablet (81 mg) by mouth daily        atenolol 100 MG tablet    TENORMIN    180 tablet    Take 1 tablet (100 mg) by mouth 2 times daily    Hypertension goal BP (blood pressure) < 140/90       losartan 100 MG tablet    COZAAR    90 tablet    TAKE ONE TABLET BY MOUTH ONCE DAILY    Hypertension goal BP (blood pressure) < 150/90       simvastatin 20 MG tablet    ZOCOR    90 tablet     TAKE ONE TABLET BY MOUTH AT BEDTIME    Hyperlipidemia LDL goal <100

## 2018-10-30 NOTE — PROGRESS NOTES
Injectable Influenza Immunization Documentation    1.  Is the person to be vaccinated sick today?   No    2. Does the person to be vaccinated have an allergy to a component   of the vaccine?   No  Egg Allergy Algorithm Link    3. Has the person to be vaccinated ever had a serious reaction   to influenza vaccine in the past?   No    4. Has the person to be vaccinated ever had Guillain-Barré syndrome?   No    Form completed by Rosalva KULKARNI  Patient verified, and informed to wait in clinic for up to 20 min. Patient will report any abnormal reactions.  Rosalva KULKARNI

## 2018-10-30 NOTE — TELEPHONE ENCOUNTER
"Routing refill request to provider for review/approval because:  Bp's ager > goal range   Adriana Nevarez RN, BSN      Norvasc  Last Written Prescription Date:  2/6/2018  Last Fill Quantity: 45,  # refills: 2   Last office visit: 9/25/2018 with prescribing provider:  4/26/2018   Future Office Visit:      Requested Prescriptions   Pending Prescriptions Disp Refills     amLODIPine (NORVASC) 5 MG tablet [Pharmacy Med Name: AMLODIPINE BESYLATE 5MG TABS] 45 tablet 2     Sig: TAKE ONE-HALF TABLET BY MOUTH EVERY DAY    Calcium Channel Blockers Protocol  Failed    10/30/2018  1:52 PM       Failed - Blood pressure under 140/90 in past 12 months    BP Readings from Last 3 Encounters:   09/25/18 160/80   07/20/18 156/77   05/17/18 (!) 137/96                Passed - Recent (12 mo) or future (30 days) visit within the authorizing provider's specialty    Patient had office visit in the last 12 months or has a visit in the next 30 days with authorizing provider or within the authorizing provider's specialty.  See \"Patient Info\" tab in inbasket, or \"Choose Columns\" in Meds & Orders section of the refill encounter.             Passed - Patient is age 18 or older       Passed - No active pregnancy on record       Passed - Normal serum creatinine on file in past 12 months    Recent Labs   Lab Test  04/26/18   0812   CR  0.94            Passed - No positive pregnancy test in past 12 months        Adriana Nevarez RN on 10/30/2018 at 4:34 PM  "

## 2018-10-31 RX ORDER — AMLODIPINE BESYLATE 5 MG/1
TABLET ORAL
Qty: 45 TABLET | Refills: 0 | Status: SHIPPED | OUTPATIENT
Start: 2018-10-31 | End: 2019-01-30

## 2018-10-31 NOTE — TELEPHONE ENCOUNTER
Please accept or deny in Dr. Raines's Absence.  He is out until Monday 11/05/18   Milena SUTTON

## 2019-01-30 ENCOUNTER — TELEPHONE (OUTPATIENT)
Dept: FAMILY MEDICINE | Facility: CLINIC | Age: 79
End: 2019-01-30

## 2019-01-30 DIAGNOSIS — I10 HYPERTENSION GOAL BP (BLOOD PRESSURE) < 150/90: ICD-10-CM

## 2019-01-30 DIAGNOSIS — E78.5 HYPERLIPIDEMIA LDL GOAL <100: ICD-10-CM

## 2019-01-30 DIAGNOSIS — I10 HYPERTENSION GOAL BP (BLOOD PRESSURE) < 140/90: ICD-10-CM

## 2019-01-30 RX ORDER — AMLODIPINE BESYLATE 5 MG/1
2.5 TABLET ORAL DAILY
Qty: 45 TABLET | Refills: 0 | Status: SHIPPED | OUTPATIENT
Start: 2019-01-30 | End: 2019-02-25

## 2019-01-30 RX ORDER — LOSARTAN POTASSIUM 100 MG/1
100 TABLET ORAL DAILY
Qty: 30 TABLET | Refills: 0 | Status: SHIPPED | OUTPATIENT
Start: 2019-01-30 | End: 2019-02-25

## 2019-01-30 NOTE — TELEPHONE ENCOUNTER
Please help her to follow up with Yanna in a month, before med runs out.  BP was high at the last visit; need lab done as well

## 2019-01-30 NOTE — TELEPHONE ENCOUNTER
"Routing to covering provider as PCP is currently out of clinic.     Cozaar  Last Written Prescription Date:  08/01/2018  Last Fill Quantity: 90,  # refills: 1   Last office visit: 04/26/2018 with prescribing provider:  Yanna   Future Office Visit:  None  Routing refill request to provider for review/approval because:  Blood pressures elevated above goal     Requested Prescriptions   Pending Prescriptions Disp Refills     losartan (COZAAR) 100 MG tablet [Pharmacy Med Name: LOSARTAN 100MG TABS] 90 tablet 1     Sig: TAKE ONE TABLET BY MOUTH ONCE DAILY    Angiotensin-II Receptors Failed - 1/30/2019 10:01 AM       Failed - Blood pressure under 140/90 in past 12 months    BP Readings from Last 3 Encounters:   09/25/18 160/80   07/20/18 156/77   05/17/18 (!) 137/96          Passed - Recent (12 mo) or future (30 days) visit within the authorizing provider's specialty    Patient had office visit in the last 12 months or has a visit in the next 30 days with authorizing provider or within the authorizing provider's specialty.  See \"Patient Info\" tab in inbasket, or \"Choose Columns\" in Meds & Orders section of the refill encounter.         Passed - Medication is active on med list       Passed - Patient is age 18 or older       Passed - No active pregnancy on record       Passed - Normal serum creatinine on file in past 12 months    Recent Labs   Lab Test 04/26/18  0812   CR 0.94          Passed - Normal serum potassium on file in past 12 months    Recent Labs   Lab Test 04/26/18  0812   POTASSIUM 4.3          Passed - No positive pregnancy test in past 12 months      Barbi Madden RN   "

## 2019-01-30 NOTE — TELEPHONE ENCOUNTER
"Norvasc  Last Written Prescription Date:  10/31/2018  Last Fill Quantity: 45,  # refills: 0   Last office visit: 04/26/2018 with prescribing provider:  Yanna   Future Office Visit:   Next 5 appointments (look out 90 days)    Feb 22, 2019  9:00 AM CST  [facility] Visit with Weston Raines MD  [facility] [address] [place] [id number] [phone]      Medication is being filled for 1 time refill only due to:  Patient has upcoming appointment with PCP.     Requested Prescriptions   Pending Prescriptions Disp Refills     amLODIPine (NORVASC) 5 MG tablet 45 tablet 0     Sig: Take 0.5 tablets (2.5 mg) by mouth daily    Calcium Channel Blockers Protocol  Failed - 1/30/2019  1:39 PM       Failed - Blood pressure under 140/90 in past 12 months    BP Readings from Last 3 Encounters:   09/25/18 160/80   07/20/18 156/77   05/17/18 (!) 137/96          Passed - Recent (12 mo) or future (30 days) visit within the authorizing provider's specialty    Patient had office visit in the last 12 months or has a visit in the next 30 days with authorizing provider or within the authorizing provider's specialty.  See \"Patient Info\" tab in inbasket, or \"Choose Columns\" in Meds & Orders section of the refill encounter.         Passed - Medication is active on med list       Passed - Patient is age 18 or older       Passed - No active pregnancy on record       Passed - Normal serum creatinine on file in past 12 months    Recent Labs   Lab Test 04/26/18  0812   CR 0.94          Passed - No positive pregnancy test in past 12 months      Barbi Madden RN   "

## 2019-02-03 ENCOUNTER — HOSPITAL ENCOUNTER (EMERGENCY)
Facility: CLINIC | Age: 79
Discharge: HOME | End: 2019-02-03
Attending: FAMILY MEDICINE | Admitting: FAMILY MEDICINE
Payer: COMMERCIAL

## 2019-02-03 ENCOUNTER — APPOINTMENT (OUTPATIENT)
Dept: CT IMAGING | Facility: CLINIC | Age: 79
End: 2019-02-03
Payer: COMMERCIAL

## 2019-02-03 VITALS
HEART RATE: 61 BPM | SYSTOLIC BLOOD PRESSURE: 190 MMHG | DIASTOLIC BLOOD PRESSURE: 85 MMHG | RESPIRATION RATE: 20 BRPM | OXYGEN SATURATION: 98 % | TEMPERATURE: 97.3 F

## 2019-02-03 DIAGNOSIS — R20.2 PARESTHESIAS: ICD-10-CM

## 2019-02-03 LAB
ALBUMIN SERPL-MCNC: 4 G/DL (ref 3.4–5)
ALP SERPL-CCNC: 123 U/L (ref 40–150)
ALT SERPL W P-5'-P-CCNC: 17 U/L (ref 0–50)
ANION GAP SERPL CALCULATED.3IONS-SCNC: 8 MMOL/L (ref 3–14)
AST SERPL W P-5'-P-CCNC: 15 U/L (ref 0–45)
BASOPHILS # BLD AUTO: 0.1 10E9/L (ref 0–0.2)
BASOPHILS NFR BLD AUTO: 0.9 %
BILIRUB SERPL-MCNC: 0.5 MG/DL (ref 0.2–1.3)
BUN SERPL-MCNC: 11 MG/DL (ref 7–30)
CALCIUM SERPL-MCNC: 8.4 MG/DL (ref 8.5–10.1)
CHLORIDE SERPL-SCNC: 106 MMOL/L (ref 94–109)
CO2 SERPL-SCNC: 27 MMOL/L (ref 20–32)
CREAT SERPL-MCNC: 0.83 MG/DL (ref 0.52–1.04)
CRP SERPL-MCNC: <2.9 MG/L (ref 0–8)
DIFFERENTIAL METHOD BLD: NORMAL
EOSINOPHIL NFR BLD AUTO: 1.1 %
ERYTHROCYTE [DISTWIDTH] IN BLOOD BY AUTOMATED COUNT: 13.2 % (ref 10–15)
ERYTHROCYTE [SEDIMENTATION RATE] IN BLOOD BY WESTERGREN METHOD: 6 MM/H (ref 0–30)
GFR SERPL CREATININE-BSD FRML MDRD: 66 ML/MIN/{1.73_M2}
GLUCOSE SERPL-MCNC: 106 MG/DL (ref 70–99)
HCT VFR BLD AUTO: 46.3 % (ref 35–47)
HGB BLD-MCNC: 15.3 G/DL (ref 11.7–15.7)
IMM GRANULOCYTES # BLD: 0 10E9/L (ref 0–0.4)
IMM GRANULOCYTES NFR BLD: 0.2 %
LACTATE BLD-SCNC: 1.4 MMOL/L (ref 0.7–2)
LYMPHOCYTES # BLD AUTO: 2 10E9/L (ref 0.8–5.3)
LYMPHOCYTES NFR BLD AUTO: 23 %
MCH RBC QN AUTO: 29.7 PG (ref 26.5–33)
MCHC RBC AUTO-ENTMCNC: 33 G/DL (ref 31.5–36.5)
MCV RBC AUTO: 90 FL (ref 78–100)
MONOCYTES # BLD AUTO: 0.7 10E9/L (ref 0–1.3)
MONOCYTES NFR BLD AUTO: 8.4 %
NEUTROPHILS # BLD AUTO: 5.6 10E9/L (ref 1.6–8.3)
NEUTROPHILS NFR BLD AUTO: 66.4 %
NRBC # BLD AUTO: 0 10*3/UL
NRBC BLD AUTO-RTO: 0 /100
PLATELET # BLD AUTO: 196 10E9/L (ref 150–450)
POTASSIUM SERPL-SCNC: 4 MMOL/L (ref 3.4–5.3)
PROT SERPL-MCNC: 7.5 G/DL (ref 6.8–8.8)
RBC # BLD AUTO: 5.16 10E12/L (ref 3.8–5.2)
SODIUM SERPL-SCNC: 141 MMOL/L (ref 133–144)
TROPONIN I SERPL-MCNC: <0.015 UG/L (ref 0–0.04)
TSH SERPL DL<=0.005 MIU/L-ACNC: 2.75 MU/L (ref 0.4–4)
WBC # BLD AUTO: 8.5 10E9/L (ref 4–11)

## 2019-02-03 PROCEDURE — 87086 URINE CULTURE/COLONY COUNT: CPT | Performed by: FAMILY MEDICINE

## 2019-02-03 PROCEDURE — 84484 ASSAY OF TROPONIN QUANT: CPT | Performed by: FAMILY MEDICINE

## 2019-02-03 PROCEDURE — 80053 COMPREHEN METABOLIC PANEL: CPT | Performed by: FAMILY MEDICINE

## 2019-02-03 PROCEDURE — 86140 C-REACTIVE PROTEIN: CPT | Performed by: FAMILY MEDICINE

## 2019-02-03 PROCEDURE — 83605 ASSAY OF LACTIC ACID: CPT | Performed by: FAMILY MEDICINE

## 2019-02-03 PROCEDURE — 85025 COMPLETE CBC W/AUTO DIFF WBC: CPT | Performed by: FAMILY MEDICINE

## 2019-02-03 PROCEDURE — 99284 EMERGENCY DEPT VISIT MOD MDM: CPT | Mod: Z6 | Performed by: FAMILY MEDICINE

## 2019-02-03 PROCEDURE — 85652 RBC SED RATE AUTOMATED: CPT | Performed by: FAMILY MEDICINE

## 2019-02-03 PROCEDURE — 87186 SC STD MICRODIL/AGAR DIL: CPT | Performed by: FAMILY MEDICINE

## 2019-02-03 PROCEDURE — 84443 ASSAY THYROID STIM HORMONE: CPT | Performed by: FAMILY MEDICINE

## 2019-02-03 PROCEDURE — 99284 EMERGENCY DEPT VISIT MOD MDM: CPT | Mod: 25 | Performed by: FAMILY MEDICINE

## 2019-02-03 PROCEDURE — 87088 URINE BACTERIA CULTURE: CPT | Performed by: FAMILY MEDICINE

## 2019-02-03 PROCEDURE — 70450 CT HEAD/BRAIN W/O DYE: CPT

## 2019-02-03 ASSESSMENT — ENCOUNTER SYMPTOMS
DIZZINESS: 0
CONSTIPATION: 0
CONFUSION: 0
VOMITING: 0
FEVER: 0
NAUSEA: 0
POLYDIPSIA: 0
SLEEP DISTURBANCE: 1
SPEECH DIFFICULTY: 0
HEADACHES: 0
CHILLS: 0
FATIGUE: 0
BACK PAIN: 0
PALPITATIONS: 0
LIGHT-HEADEDNESS: 0
COUGH: 0
SEIZURES: 0
ABDOMINAL PAIN: 0
WEAKNESS: 0
EYE REDNESS: 0
DIFFICULTY URINATING: 0
SORE THROAT: 0
CHEST TIGHTNESS: 0
NECK PAIN: 0
NUMBNESS: 1
EYE PAIN: 0
FACIAL ASYMMETRY: 0
SHORTNESS OF BREATH: 0
NERVOUS/ANXIOUS: 0
APPETITE CHANGE: 0
TREMORS: 0

## 2019-02-03 NOTE — ED AVS SNAPSHOT
[facility] Emergency Department  [address] [place] [address]  Phone:  [phone]  Fax:  [phone]                                    Keyla Mari   MRN: [id number]    Department:  [facility] Emergency Department   Date of Visit:  2/3/2019           After Visit Summary Signature Page    I have received my discharge instructions, and my questions have been answered. I have discussed any challenges I see with this plan with the nurse or doctor.    ..........................................................................................................................................  Patient/Patient Representative Signature      ..........................................................................................................................................  Patient Representative Print Name and Relationship to Patient    ..................................................               ................................................  Date                                   Time    ..........................................................................................................................................  Reviewed by Signature/Title    ...................................................              ..............................................  Date                                               Time          22EPIC Rev 08/18

## 2019-02-03 NOTE — ED TRIAGE NOTES
Was making dinner at 1600 today and began to have tingling all over.  Symptoms have continued.  Had TIA in the past.

## 2019-02-04 NOTE — ED PROVIDER NOTES
"  History     Chief Complaint   Patient presents with     Tingling     HPI  Keyla Mari is a 78 year old female with history of hypertension, lung mass, TIA, CKD, hyperlipidemia, gout history of GI bleed, history of proximal humerus fracture who is brought to the emergency department by her daughter who lives with her with complaint of tingling all over.  She feels a sense of numbness and tingling to her scalp face both arms and hands and both legs and feet her chest and her back.  The sensation is very symmetric.  It is not painful.  She had this for 3 or 4 hours yesterday and it resolved.  She states she has had \"TIAs\" in the past with very similar symptoms.  Her daughter states that she is acting her usual self.  There is no confusion.  Speech is clear and articulate.  She is not hyperventilating or anxious.  Patient and daughter deny any trouble with anxiety in the past.  She is on no new medications.  She takes no over the counters or herbal remedies.  She is on no special diets.  Denies any difficulty with her hearing or vision.  Denies tinnitus.  Denies any trouble walking or with her balance.  There is been no recent head injuries.  She does not feel ill denies fever chills.  She denies any headache.  Has no history of migraines.    Allergies:  Allergies   Allergen Reactions     Indomethacin GI Disturbance       Problem List:    Patient Active Problem List    Diagnosis Date Noted     Hypertension goal BP (blood pressure) < 150/90 08/07/2017     Priority: Medium     Nodule of right lung 06/05/2015     Priority: Medium     Lung mass, spiculated in the right upper lobe, 1.1 x 2.2 x 0.6 cm. 05/20/2015     Priority: Medium     HTN (hypertension) 05/13/2015     Priority: Medium     Mandibular soft tissue mass 05/13/2015     Priority: Medium     Absence of menstruation 07/31/2014     Priority: Medium     Transient cerebral ischemia 07/20/2014     Priority: Medium     Diagnosis updated by automated process. " Provider to review and confirm.       Cerebral infarction (H) 06/25/2013     Priority: Medium     Diagnosis updated by automated process. Provider to review and confirm.       Sleep disorder 10/10/2012     Priority: Medium     Advanced directives, counseling/discussion 07/09/2012     Priority: Medium     Has a copy already here in clinic she states.  7/9/2012        Proximal humerus fracture 03/07/2011     Priority: Medium     CKD (chronic kidney disease) stage 3, GFR 30-59 ml/min (H) 02/10/2011     Priority: Medium     Lumbar radiculopathy 02/09/2011     Priority: Medium     HYPERLIPIDEMIA LDL GOAL <100 10/31/2010     Priority: Medium     Gout 10/11/2010     Priority: Medium     Displacement of lumbar intervertebral disc without myelopathy 04/07/2008     Priority: Medium     Transient cerebral ischemia 01/24/2006     Priority: Medium     Problem list name updated by automated process. Provider to review          Past Medical History:    Past Medical History:   Diagnosis Date     Acute, but ill-defined, cerebrovascular disease      Closed fracture of unspecified part of neck of femur      CVA (cerebral infarction) 2005     Elevated blood pressure reading without diagnosis of hypertension      Hemorrhage of gastrointestinal tract, unspecified 08/13/2007     History of blood transfusion      HTN (hypertension)      Hyperlipidaemia      Other and unspecified hyperlipidemia      PONV (postoperative nausea and vomiting)      Unspecified cerebral artery occlusion with cerebral infarction        Past Surgical History:    Past Surgical History:   Procedure Laterality Date     COLONOSCOPY  08/13/07      COLONOSCOPY W BIOPSY  07/01/07      DILATION/CURETTAGE DIAG/THER NON OB  1984    D & C     LOBECTOMY LUNG Right 6/5/2015    Procedure: LOBECTOMY LUNG;  Surgeon: Yariel Lund MD;  Location: [facility] OR     PHACOEMULSIFICATION WITH STANDARD INTRAOCULAR LENS IMPLANT Right 5/3/2018    Procedure: PHACOEMULSIFICATION WITH  STANDARD INTRAOCULAR LENS IMPLANT;  PHACOEMULSIFICATION WITH STANDARD INTRAOCULAR LENS IMPLANT RIGHT;  Surgeon: Chevy Neves MD;  Location: [facility] OR     PHACOEMULSIFICATION WITH STANDARD INTRAOCULAR LENS IMPLANT Left 2018    Procedure: PHACOEMULSIFICATION WITH STANDARD INTRAOCULAR LENS IMPLANT;  PHACOEMULSIFICATION WITH STANDARD INTRAOCULAR LENS IMPLANT LEFT EYE;  Surgeon: Chevy Neves MD;  Location: [facility] OR     SURGICAL HISTORY OF -       Left leg fx--had pinning     THORACOTOMY Right 2015    Procedure: THORACOTOMY;  Surgeon: Yariel Lund MD;  Location: [facility] OR       Family History:    Family History   Problem Relation Age of Onset     Hypertension Father      Heart Disease Father         Fatal MI at 64     Diabetes Mother         Fatal complication of DM at 70     Hypertension Mother      Cancer Maternal Aunt         Stomach cancer x3     Gastrointestinal Disease Maternal Uncle         Bleeding ulcer - fatal     Diabetes Paternal Grandfather      Heart Disease Paternal Uncle         MI     Heart Disease Maternal Uncle         MI - fatal     Cancer Paternal Aunt         Breast     Circulatory Paternal Aunt         Aortic aneurysm     Breast Cancer Daughter 54       Social History:  Marital Status:   [5]  Social History     Tobacco Use     Smoking status: Former Smoker     Last attempt to quit: 3/16/1985     Years since quittin.9     Smokeless tobacco: Never Used   Substance Use Topics     Alcohol use: No     Alcohol/week: 0.0 oz     Drug use: No        Medications:      aspirin 81 MG tablet   amLODIPine (NORVASC) 5 MG tablet   atenolol (TENORMIN) 100 MG tablet   losartan (COZAAR) 100 MG tablet   simvastatin (ZOCOR) 20 MG tablet         Review of Systems   Constitutional: Negative for appetite change, chills, fatigue and fever.   HENT: Negative for congestion and sore throat.    Eyes: Negative for pain, redness and visual disturbance.   Respiratory: Negative for cough,  chest tightness and shortness of breath.    Cardiovascular: Negative for chest pain and palpitations.   Gastrointestinal: Negative for abdominal pain, constipation, nausea and vomiting.   Endocrine: Negative for polydipsia and polyuria.   Genitourinary: Negative for difficulty urinating.   Musculoskeletal: Negative for back pain and neck pain.   Skin: Negative for rash.   Allergic/Immunologic: Negative for immunocompromised state.   Neurological: Positive for numbness. Negative for dizziness, tremors, seizures, syncope, facial asymmetry, speech difficulty, weakness, light-headedness and headaches.   Psychiatric/Behavioral: Positive for sleep disturbance. Negative for confusion. The patient is not nervous/anxious.         She has chronic insomnia and sleep difficulties which are unchanged   All other systems reviewed and are negative.      Physical Exam   BP: 196/90  Heart Rate: 65  Temp: 97.8  F (36.6  C)  Resp: 16  SpO2: 98 %      Physical Exam   Constitutional: She is oriented to person, place, and time. She appears well-developed and well-nourished. No distress.   Alert thin pleasant 78-year-old female.  She is smiling and appears in no distress or any discomfort.  She appears adequately nourished and hydrated.  She is able to walk back to her room without assistance.  She is afebrile vital signs are stable.  Blood pressure elevated at 196/90.vs  /90   Temp 97.8  F (36.6  C) (Oral)   Resp 16   SpO2 98%   .   HENT:   Head: Normocephalic and atraumatic.   Right Ear: External ear normal.   Left Ear: External ear normal.   Nose: Nose normal.   Mouth/Throat: Oropharynx is clear and moist.   Eyes: Conjunctivae and EOM are normal. Pupils are equal, round, and reactive to light.   Neck: Normal range of motion.   Cardiovascular: Normal rate, regular rhythm, normal heart sounds and intact distal pulses.   Pulmonary/Chest: Effort normal and breath sounds normal.   Abdominal: Soft. Bowel sounds are normal. There is  no tenderness.   Musculoskeletal: Normal range of motion.   Neurological: She is alert and oriented to person, place, and time.   She is alert and oriented x3.  Her speech is clear and articulate.  She answers questions quickly and appropriately.  Her face is symmetric.  Her pupils are equal react and accommodate.  Extraocular movements intact without nystagmus.  Vision reported normal.  Hearing reported normal.  Sensation to light touch to her face and all 4 extremities is equal and intact.  Motor is 4/4 to all 4 extremities and very strong and vigorous.  Her balance station and gait are noted to be very steady and normal appearing.  Finger-nose-finger is normal.  Reflexes are brisk and equal.  No clonus    Skin: Skin is warm and dry. Capillary refill takes less than 2 seconds. No rash noted. No erythema. No pallor.   Psychiatric: She has a normal mood and affect. Her behavior is normal. Judgment and thought content normal.   She has not hyperventilating.  She does not appear anxious.   Nursing note and vitals reviewed.      ED Course       National Institutes of [employer] Stroke Scale  Exam Interval: Baseline   Score    Level of consciousness: (0)   Alert, keenly responsive    LOC questions: (0)   Answers both questions correctly    LOC commands: (0)   Performs both tasks correctly    Best gaze: (0)   Normal    Visual: (0)   No visual loss    Facial palsy: (0)   Normal symmetrical movements    Motor arm (left): (0)   No drift    Motor arm (right): (0)   No drift    Motor leg (left): (0)   No drift    Motor leg (right): (0)   No drift    Limb ataxia: (0)   Absent    Sensory: (0)   Normal- no sensory loss    Best language: (0)   Normal- no aphasia    Dysarthria: (0)   Normal    Extinction and inattention: (0)   No abnormality        Total Score:  0          Procedures             Results for orders placed or performed during the hospital encounter of 02/03/19   CT Head w/o Contrast    Narrative    CT SCAN OF THE HEAD  WITHOUT CONTRAST   2/3/2019 7:16 PM     HISTORY: Transient ischemic attack, initial exam. Generalized numbness  and tingling. No lateralized symptoms. NIH score of 0.    TECHNIQUE:  Axial images of the head and coronal reformations without  IV contrast material. Radiation dose for this scan was reduced using  automated exposure control, adjustment of the mA and/or kV according  to patient size, or iterative reconstruction technique.    COMPARISON: Head CT 10/9/2017    FINDINGS:  Mild volume loss is present. Patchy white matter hypoattenuation  likely represents chronic small vessel ischemic change. Old left  superior cerebellar infarct noted. No evidence of acute ischemia,  hemorrhage, mass, mass effect, or hydrocephalus. The visualized  calvarium, skull base, paranasal sinuses, and extracranial soft  tissues are unremarkable. Bilateral lens replacements are present, new  compared to 10/9/2017.      Impression    IMPRESSION:   No evidence of acute ischemia or hemorrhage. Volume loss and white  matter hypoattenuation likely reflecting chronic small vessel ischemic  change. Old left superior cerebellar infarct, unchanged.    TURNER BLANK MD   CBC with platelets differential   Result Value Ref Range    WBC 8.5 4.0 - 11.0 10e9/L    RBC Count 5.16 3.8 - 5.2 10e12/L    Hemoglobin 15.3 11.7 - 15.7 g/dL    Hematocrit 46.3 35.0 - 47.0 %    MCV 90 78 - 100 fl    MCH 29.7 26.5 - 33.0 pg    MCHC 33.0 31.5 - 36.5 g/dL    RDW 13.2 10.0 - 15.0 %    Platelet Count 196 150 - 450 10e9/L    Diff Method Automated Method     % Neutrophils 66.4 %    % Lymphocytes 23.0 %    % Monocytes 8.4 %    % Eosinophils 1.1 %    % Basophils 0.9 %    % Immature Granulocytes 0.2 %    Nucleated RBCs 0 0 /100    Absolute Neutrophil 5.6 1.6 - 8.3 10e9/L    Absolute Lymphocytes 2.0 0.8 - 5.3 10e9/L    Absolute Monocytes 0.7 0.0 - 1.3 10e9/L    Absolute Basophils 0.1 0.0 - 0.2 10e9/L    Abs Immature Granulocytes 0.0 0 - 0.4 10e9/L    Absolute Nucleated  RBC 0.0    Comprehensive metabolic panel   Result Value Ref Range    Sodium 141 133 - 144 mmol/L    Potassium 4.0 3.4 - 5.3 mmol/L    Chloride 106 94 - 109 mmol/L    Carbon Dioxide 27 20 - 32 mmol/L    Anion Gap 8 3 - 14 mmol/L    Glucose 106 (H) 70 - 99 mg/dL    Urea Nitrogen 11 7 - 30 mg/dL    Creatinine 0.83 0.52 - 1.04 mg/dL    GFR Estimate 66 >60 mL/min/[1.73_m2]    GFR Estimate If Black 77 >60 mL/min/[1.73_m2]    Calcium 8.4 (L) 8.5 - 10.1 mg/dL    Bilirubin Total 0.5 0.2 - 1.3 mg/dL    Albumin 4.0 3.4 - 5.0 g/dL    Protein Total 7.5 6.8 - 8.8 g/dL    Alkaline Phosphatase 123 40 - 150 U/L    ALT 17 0 - 50 U/L    AST 15 0 - 45 U/L   Lactic acid whole blood   Result Value Ref Range    Lactic Acid 1.4 0.7 - 2.0 mmol/L   Troponin I   Result Value Ref Range    Troponin I ES <0.015 0.000 - 0.045 ug/L   TSH   Result Value Ref Range    TSH 2.75 0.40 - 4.00 mU/L   CRP inflammation   Result Value Ref Range    CRP Inflammation <2.9 0.0 - 8.0 mg/L   Erythrocyte sedimentation rate auto   Result Value Ref Range    Sed Rate 6 0 - 30 mm/h         Critical Care time:  none               Results for orders placed or performed during the hospital encounter of 02/03/19 (from the past 24 hour(s))   CBC with platelets differential   Result Value Ref Range    WBC 8.5 4.0 - 11.0 10e9/L    RBC Count 5.16 3.8 - 5.2 10e12/L    Hemoglobin 15.3 11.7 - 15.7 g/dL    Hematocrit 46.3 35.0 - 47.0 %    MCV 90 78 - 100 fl    MCH 29.7 26.5 - 33.0 pg    MCHC 33.0 31.5 - 36.5 g/dL    RDW 13.2 10.0 - 15.0 %    Platelet Count 196 150 - 450 10e9/L    Diff Method Automated Method     % Neutrophils 66.4 %    % Lymphocytes 23.0 %    % Monocytes 8.4 %    % Eosinophils 1.1 %    % Basophils 0.9 %    % Immature Granulocytes 0.2 %    Nucleated RBCs 0 0 /100    Absolute Neutrophil 5.6 1.6 - 8.3 10e9/L    Absolute Lymphocytes 2.0 0.8 - 5.3 10e9/L    Absolute Monocytes 0.7 0.0 - 1.3 10e9/L    Absolute Basophils 0.1 0.0 - 0.2 10e9/L    Abs Immature Granulocytes  0.0 0 - 0.4 10e9/L    Absolute Nucleated RBC 0.0    Comprehensive metabolic panel   Result Value Ref Range    Sodium 141 133 - 144 mmol/L    Potassium 4.0 3.4 - 5.3 mmol/L    Chloride 106 94 - 109 mmol/L    Carbon Dioxide 27 20 - 32 mmol/L    Anion Gap 8 3 - 14 mmol/L    Glucose 106 (H) 70 - 99 mg/dL    Urea Nitrogen 11 7 - 30 mg/dL    Creatinine 0.83 0.52 - 1.04 mg/dL    GFR Estimate 66 >60 mL/min/[1.73_m2]    GFR Estimate If Black 77 >60 mL/min/[1.73_m2]    Calcium 8.4 (L) 8.5 - 10.1 mg/dL    Bilirubin Total 0.5 0.2 - 1.3 mg/dL    Albumin 4.0 3.4 - 5.0 g/dL    Protein Total 7.5 6.8 - 8.8 g/dL    Alkaline Phosphatase 123 40 - 150 U/L    ALT 17 0 - 50 U/L    AST 15 0 - 45 U/L   Lactic acid whole blood   Result Value Ref Range    Lactic Acid 1.4 0.7 - 2.0 mmol/L   Troponin I   Result Value Ref Range    Troponin I ES <0.015 0.000 - 0.045 ug/L   TSH   Result Value Ref Range    TSH 2.75 0.40 - 4.00 mU/L   CRP inflammation   Result Value Ref Range    CRP Inflammation <2.9 0.0 - 8.0 mg/L   Erythrocyte sedimentation rate auto   Result Value Ref Range    Sed Rate 6 0 - 30 mm/h   CT Head w/o Contrast    Narrative    CT SCAN OF THE HEAD WITHOUT CONTRAST   2/3/2019 7:16 PM     HISTORY: Transient ischemic attack, initial exam. Generalized numbness  and tingling. No lateralized symptoms. NIH score of 0.    TECHNIQUE:  Axial images of the head and coronal reformations without  IV contrast material. Radiation dose for this scan was reduced using  automated exposure control, adjustment of the mA and/or kV according  to patient size, or iterative reconstruction technique.    COMPARISON: Head CT 10/9/2017    FINDINGS:  Mild volume loss is present. Patchy white matter hypoattenuation  likely represents chronic small vessel ischemic change. Old left  superior cerebellar infarct noted. No evidence of acute ischemia,  hemorrhage, mass, mass effect, or hydrocephalus. The visualized  calvarium, skull base, paranasal sinuses, and  extracranial soft  tissues are unremarkable. Bilateral lens replacements are present, new  compared to 10/9/2017.      Impression    IMPRESSION:   No evidence of acute ischemia or hemorrhage. Volume loss and white  matter hypoattenuation likely reflecting chronic small vessel ischemic  change. Old left superior cerebellar infarct, unchanged.    TURNER BLANK MD       Medications - No data to display    Assessments & Plan (with Medical Decision Making)   MDM--78-year-old female with a history of TIAs and CVA who presents the emergency room with tingling sensation to her entire body.  She is on no new medications.  She is not ill.  She has a nonfocal neurological exam.  There are no other neurological deficits.  Motor is 4/4 to all 4 extremities.  Cranial nerves are all intact.  I discussed the findings of her normal CT scan and laboratories with the patient and her daughter.  I recommend she follow-up with her primary care physician if she has ongoing difficulties with this.  If she develops any strokelike symptoms as I discussed she should return to the emergency department.  She already takes a baby aspirin a day and I recommended nothing further.  She lives with her daughter and is in a safe environment.  Patient and daughter are comfortable with this evaluation treatment and discharge and the patient discharged in good condition.  The  I have reviewed the nursing notes.    I have reviewed the findings, diagnosis, plan and need for follow up with the patient.               Medication List      There are no discharge medications for this visit.         Final diagnoses:   Paresthesias       2/3/2019   [facility] EMERGENCY DEPARTMENT     Gauri, Trevor MARTI MD  02/03/19 2023

## 2019-02-05 LAB
BACTERIA SPEC CULT: ABNORMAL
BACTERIA SPEC CULT: ABNORMAL
Lab: ABNORMAL
SPECIMEN SOURCE: ABNORMAL

## 2019-02-05 NOTE — RESULT ENCOUNTER NOTE
Final Urine Culture Report on 2/5/19  Emergency Dept/Urgent Care discharge antibiotic prescribed: None;  On 2/4/19 per ED lab  Result protocol,   Rx for Sulfamethoxazole-Trimethoprim (Bactrim DS, Septra DS) 800-160 mg PO tablet, 1 tablet by mouth 2 times daily for 3 days sent to pharmacy  #1. Bacteria, >100,000 colonies/mL Escherichia coli, is SUSCEPTIBLE to Antibiotic.    As per [facility] ED Lab Result protocol, no change in antibiotic therapy.

## 2019-02-17 ENCOUNTER — APPOINTMENT (OUTPATIENT)
Dept: CT IMAGING | Facility: CLINIC | Age: 79
End: 2019-02-17
Attending: EMERGENCY MEDICINE
Payer: COMMERCIAL

## 2019-02-17 ENCOUNTER — HOSPITAL ENCOUNTER (EMERGENCY)
Facility: CLINIC | Age: 79
Discharge: HOME | End: 2019-02-17
Attending: EMERGENCY MEDICINE | Admitting: EMERGENCY MEDICINE
Payer: COMMERCIAL

## 2019-02-17 VITALS
SYSTOLIC BLOOD PRESSURE: 165 MMHG | BODY MASS INDEX: 24.98 KG/M2 | DIASTOLIC BLOOD PRESSURE: 78 MMHG | HEIGHT: 63 IN | TEMPERATURE: 96.1 F | OXYGEN SATURATION: 96 % | HEART RATE: 55 BPM | WEIGHT: 141 LBS | RESPIRATION RATE: 17 BRPM

## 2019-02-17 DIAGNOSIS — K57.32 DIVERTICULITIS OF COLON: ICD-10-CM

## 2019-02-17 LAB
ALBUMIN SERPL-MCNC: 3.5 G/DL (ref 3.4–5)
ALBUMIN UR-MCNC: NEGATIVE MG/DL
ALP SERPL-CCNC: 92 U/L (ref 40–150)
ALT SERPL W P-5'-P-CCNC: 17 U/L (ref 0–50)
ANION GAP SERPL CALCULATED.3IONS-SCNC: 9 MMOL/L (ref 3–14)
APPEARANCE UR: CLEAR
AST SERPL W P-5'-P-CCNC: 16 U/L (ref 0–45)
BACTERIA #/AREA URNS HPF: ABNORMAL /HPF
BASOPHILS # BLD AUTO: 0.1 10E9/L (ref 0–0.2)
BASOPHILS NFR BLD AUTO: 0.6 %
BILIRUB SERPL-MCNC: 0.9 MG/DL (ref 0.2–1.3)
BILIRUB UR QL STRIP: NEGATIVE
BUN SERPL-MCNC: 13 MG/DL (ref 7–30)
CALCIUM SERPL-MCNC: 9 MG/DL (ref 8.5–10.1)
CHLORIDE SERPL-SCNC: 104 MMOL/L (ref 94–109)
CO2 SERPL-SCNC: 26 MMOL/L (ref 20–32)
COLOR UR AUTO: ABNORMAL
CREAT SERPL-MCNC: 0.83 MG/DL (ref 0.52–1.04)
DIFFERENTIAL METHOD BLD: NORMAL
EOSINOPHIL NFR BLD AUTO: 1.1 %
ERYTHROCYTE [DISTWIDTH] IN BLOOD BY AUTOMATED COUNT: 13.2 % (ref 10–15)
GFR SERPL CREATININE-BSD FRML MDRD: 66 ML/MIN/{1.73_M2}
GLUCOSE SERPL-MCNC: 103 MG/DL (ref 70–99)
GLUCOSE UR STRIP-MCNC: NEGATIVE MG/DL
HCT VFR BLD AUTO: 44.3 % (ref 35–47)
HGB BLD-MCNC: 14.8 G/DL (ref 11.7–15.7)
HGB UR QL STRIP: NEGATIVE
IMM GRANULOCYTES # BLD: 0 10E9/L (ref 0–0.4)
IMM GRANULOCYTES NFR BLD: 0.2 %
KETONES UR STRIP-MCNC: 5 MG/DL
LEUKOCYTE ESTERASE UR QL STRIP: NEGATIVE
LIPASE SERPL-CCNC: 86 U/L (ref 73–393)
LYMPHOCYTES # BLD AUTO: 1.7 10E9/L (ref 0.8–5.3)
LYMPHOCYTES NFR BLD AUTO: 17.4 %
MCH RBC QN AUTO: 29.4 PG (ref 26.5–33)
MCHC RBC AUTO-ENTMCNC: 33.4 G/DL (ref 31.5–36.5)
MCV RBC AUTO: 88 FL (ref 78–100)
MONOCYTES # BLD AUTO: 0.8 10E9/L (ref 0–1.3)
MONOCYTES NFR BLD AUTO: 8.2 %
MUCOUS THREADS #/AREA URNS LPF: PRESENT /LPF
NEUTROPHILS # BLD AUTO: 7.1 10E9/L (ref 1.6–8.3)
NEUTROPHILS NFR BLD AUTO: 72.5 %
NITRATE UR QL: POSITIVE
NRBC # BLD AUTO: 0 10*3/UL
NRBC BLD AUTO-RTO: 0 /100
PH UR STRIP: 7 PH (ref 5–7)
PLATELET # BLD AUTO: 210 10E9/L (ref 150–450)
POTASSIUM SERPL-SCNC: 3.7 MMOL/L (ref 3.4–5.3)
PROT SERPL-MCNC: 7.6 G/DL (ref 6.8–8.8)
RBC # BLD AUTO: 5.03 10E12/L (ref 3.8–5.2)
RBC #/AREA URNS AUTO: <1 /HPF (ref 0–2)
SODIUM SERPL-SCNC: 139 MMOL/L (ref 133–144)
SOURCE: ABNORMAL
SP GR UR STRIP: 1 (ref 1–1.03)
SQUAMOUS #/AREA URNS AUTO: 1 /HPF (ref 0–1)
UROBILINOGEN UR STRIP-MCNC: 0 MG/DL (ref 0–2)
WBC # BLD AUTO: 9.8 10E9/L (ref 4–11)
WBC #/AREA URNS AUTO: 1 /HPF (ref 0–5)

## 2019-02-17 PROCEDURE — 74177 CT ABD & PELVIS W/CONTRAST: CPT

## 2019-02-17 PROCEDURE — 25000128 H RX IP 250 OP 636: Performed by: EMERGENCY MEDICINE

## 2019-02-17 PROCEDURE — 25000125 ZZHC RX 250: Performed by: EMERGENCY MEDICINE

## 2019-02-17 PROCEDURE — 25800030 ZZH RX IP 258 OP 636: Performed by: EMERGENCY MEDICINE

## 2019-02-17 PROCEDURE — 99284 EMERGENCY DEPT VISIT MOD MDM: CPT | Mod: Z6 | Performed by: EMERGENCY MEDICINE

## 2019-02-17 PROCEDURE — 99285 EMERGENCY DEPT VISIT HI MDM: CPT | Mod: 25 | Performed by: EMERGENCY MEDICINE

## 2019-02-17 PROCEDURE — 81001 URINALYSIS AUTO W/SCOPE: CPT | Performed by: EMERGENCY MEDICINE

## 2019-02-17 PROCEDURE — 80053 COMPREHEN METABOLIC PANEL: CPT | Performed by: EMERGENCY MEDICINE

## 2019-02-17 PROCEDURE — 96360 HYDRATION IV INFUSION INIT: CPT | Mod: 59 | Performed by: EMERGENCY MEDICINE

## 2019-02-17 PROCEDURE — 85025 COMPLETE CBC W/AUTO DIFF WBC: CPT | Performed by: EMERGENCY MEDICINE

## 2019-02-17 PROCEDURE — 83690 ASSAY OF LIPASE: CPT | Performed by: EMERGENCY MEDICINE

## 2019-02-17 RX ORDER — IOPAMIDOL 755 MG/ML
200 INJECTION, SOLUTION INTRAVASCULAR ONCE
Status: COMPLETED | OUTPATIENT
Start: 2019-02-17 | End: 2019-02-17

## 2019-02-17 RX ORDER — SODIUM CHLORIDE 9 MG/ML
1000 INJECTION, SOLUTION INTRAVENOUS CONTINUOUS
Status: DISCONTINUED | OUTPATIENT
Start: 2019-02-17 | End: 2019-02-17 | Stop reason: HOSPADM

## 2019-02-17 RX ADMIN — SODIUM CHLORIDE 60 ML: 9 INJECTION, SOLUTION INTRAVENOUS at 14:21

## 2019-02-17 RX ADMIN — SODIUM CHLORIDE 1000 ML: 9 INJECTION, SOLUTION INTRAVENOUS at 14:37

## 2019-02-17 RX ADMIN — SODIUM CHLORIDE 1000 ML: 9 INJECTION, SOLUTION INTRAVENOUS at 12:32

## 2019-02-17 RX ADMIN — IOPAMIDOL 75 ML: 755 INJECTION, SOLUTION INTRAVENOUS at 14:21

## 2019-02-17 ASSESSMENT — ENCOUNTER SYMPTOMS
DYSURIA: 0
HEMATURIA: 0
ABDOMINAL PAIN: 1
FREQUENCY: 0
DIFFICULTY URINATING: 0
NAUSEA: 0
BACK PAIN: 0
CONSTIPATION: 1

## 2019-02-17 ASSESSMENT — MIFFLIN-ST. JEOR: SCORE: 1078.7

## 2019-02-17 NOTE — ED AVS SNAPSHOT
[facility] Emergency Department  [address] [place] [address]  Phone:  [phone]  Fax:  [phone]                                    Keyla Mari   MRN: [id number]    Department:  [facility] Emergency Department   Date of Visit:  2/17/2019           After Visit Summary Signature Page    I have received my discharge instructions, and my questions have been answered. I have discussed any challenges I see with this plan with the nurse or doctor.    ..........................................................................................................................................  Patient/Patient Representative Signature      ..........................................................................................................................................  Patient Representative Print Name and Relationship to Patient    ..................................................               ................................................  Date                                   Time    ..........................................................................................................................................  Reviewed by Signature/Title    ...................................................              ..............................................  Date                                               Time          22EPIC Rev 08/18

## 2019-02-17 NOTE — ED PROVIDER NOTES
History     Chief Complaint   Patient presents with     Abdominal Pain     The history is provided by the patient.     Keyla Mari is a 78 year old female who presents to the ED complaining of abdominal pains. Patient states she has had generalized abdominal pains for the past 4 days.  Pain is moderate and sharp at times.  Her last bm was 4 days ago, the day the pain began. Patient's pain does not radiate into her chest or back. Patient has never had pain like this in the past. Patient denies history of abdominal surgeries. Patient denies urinary symptoms. No exposure to ill people. She is not nauseated. She has a history of a colonoscopy in 2007 when she was diagnosed with diverticulosis and colon polyp. Polypectomy was performed, the polyp was benign. Ulcer formed at the base of the polyp that was clipped.  Denies any other systemic symptoms.  No chest pain or shortness of breath.  No cough.  No treatment for pain prior to arrival.  Patient defers pain meds.    Allergies:  Allergies   Allergen Reactions     Indomethacin GI Disturbance       Problem List:    Patient Active Problem List    Diagnosis Date Noted     Hypertension goal BP (blood pressure) < 150/90 08/07/2017     Priority: Medium     Nodule of right lung 06/05/2015     Priority: Medium     Lung mass, spiculated in the right upper lobe, 1.1 x 2.2 x 0.6 cm. 05/20/2015     Priority: Medium     HTN (hypertension) 05/13/2015     Priority: Medium     Mandibular soft tissue mass 05/13/2015     Priority: Medium     Absence of menstruation 07/31/2014     Priority: Medium     Transient cerebral ischemia 07/20/2014     Priority: Medium     Diagnosis updated by automated process. Provider to review and confirm.       Cerebral infarction (H) 06/25/2013     Priority: Medium     Diagnosis updated by automated process. Provider to review and confirm.       Sleep disorder 10/10/2012     Priority: Medium     Advanced directives, counseling/discussion 07/09/2012      Priority: Medium     Has a copy already here in clinic she states.  7/9/2012        Proximal humerus fracture 03/07/2011     Priority: Medium     CKD (chronic kidney disease) stage 3, GFR 30-59 ml/min (H) 02/10/2011     Priority: Medium     Lumbar radiculopathy 02/09/2011     Priority: Medium     HYPERLIPIDEMIA LDL GOAL <100 10/31/2010     Priority: Medium     Gout 10/11/2010     Priority: Medium     Displacement of lumbar intervertebral disc without myelopathy 04/07/2008     Priority: Medium     Transient cerebral ischemia 01/24/2006     Priority: Medium     Problem list name updated by automated process. Provider to review          Past Medical History:    Past Medical History:   Diagnosis Date     Acute, but ill-defined, cerebrovascular disease      Closed fracture of unspecified part of neck of femur      CVA (cerebral infarction) 2005     Elevated blood pressure reading without diagnosis of hypertension      Hemorrhage of gastrointestinal tract, unspecified 08/13/2007     History of blood transfusion      HTN (hypertension)      Hyperlipidaemia      Other and unspecified hyperlipidemia      PONV (postoperative nausea and vomiting)      Unspecified cerebral artery occlusion with cerebral infarction        Past Surgical History:    Past Surgical History:   Procedure Laterality Date     COLONOSCOPY  08/13/07     HC COLONOSCOPY W BIOPSY  07/01/07     HC DILATION/CURETTAGE DIAG/THER NON OB  1984    D & C     LOBECTOMY LUNG Right 6/5/2015    Procedure: LOBECTOMY LUNG;  Surgeon: Yariel Lund MD;  Location: [facility] OR     PHACOEMULSIFICATION WITH STANDARD INTRAOCULAR LENS IMPLANT Right 5/3/2018    Procedure: PHACOEMULSIFICATION WITH STANDARD INTRAOCULAR LENS IMPLANT;  PHACOEMULSIFICATION WITH STANDARD INTRAOCULAR LENS IMPLANT RIGHT;  Surgeon: Chevy Neves MD;  Location: [facility] OR     PHACOEMULSIFICATION WITH STANDARD INTRAOCULAR LENS IMPLANT Left 5/17/2018    Procedure: PHACOEMULSIFICATION WITH STANDARD  INTRAOCULAR LENS IMPLANT;  PHACOEMULSIFICATION WITH STANDARD INTRAOCULAR LENS IMPLANT LEFT EYE;  Surgeon: Chevy Neves MD;  Location: [facility] OR     SURGICAL HISTORY OF -       Left leg fx--had pinning     THORACOTOMY Right 2015    Procedure: THORACOTOMY;  Surgeon: Yariel Lund MD;  Location: [facility] OR       Family History:    Family History   Problem Relation Age of Onset     Hypertension Father      Heart Disease Father         Fatal MI at 64     Diabetes Mother         Fatal complication of DM at 70     Hypertension Mother      Cancer Maternal Aunt         Stomach cancer x3     Gastrointestinal Disease Maternal Uncle         Bleeding ulcer - fatal     Diabetes Paternal Grandfather      Heart Disease Paternal Uncle         MI     Heart Disease Maternal Uncle         MI - fatal     Cancer Paternal Aunt         Breast     Circulatory Paternal Aunt         Aortic aneurysm     Breast Cancer Daughter 54       Social History:  Marital Status:   [5]  Social History     Tobacco Use     Smoking status: Former Smoker     Last attempt to quit: 3/16/1985     Years since quittin.9     Smokeless tobacco: Never Used   Substance Use Topics     Alcohol use: No     Alcohol/week: 0.0 oz     Drug use: No        Medications:      amLODIPine (NORVASC) 5 MG tablet   amoxicillin-clavulanate (AUGMENTIN) 875-125 MG tablet   aspirin 81 MG tablet   atenolol (TENORMIN) 100 MG tablet   losartan (COZAAR) 100 MG tablet   simvastatin (ZOCOR) 20 MG tablet         Review of Systems   Cardiovascular: Negative for chest pain.   Gastrointestinal: Positive for abdominal pain and constipation. Negative for nausea.   Genitourinary: Negative for decreased urine volume, difficulty urinating, dysuria, frequency, hematuria and urgency.   Musculoskeletal: Negative for back pain.   All other systems reviewed and are negative.      Physical Exam   BP: (!) 192/95  Pulse: 57  Temp: 96.1  F (35.6  C)  Resp: 17  Height: 160 cm (5'  "3\")  Weight: 64 kg (141 lb)  SpO2: 96 %      Physical Exam   Nursing note and vitals reviewed.  General alert cooperative female in mild distress.  HEENT reveals no scleral icterus.  Speech is clear concise.  Neck is supple.  Lungs are clear without adventitious sounds.  Cardiac auscultation was normal.  She had no CVA tenderness on percussion.  Abdomen revealed active bowel sounds and on palpation she is tender diffusely mostly noted in the upper abdomen.  No guarding or rebound.  No organomegaly.  No skin rash of the flank or abdomen.  No leg pain or swelling.    ED Course        Procedures    Results for orders placed or performed during the hospital encounter of 02/17/19 (from the past 24 hour(s))   CBC with platelets differential   Result Value Ref Range    WBC 9.8 4.0 - 11.0 10e9/L    RBC Count 5.03 3.8 - 5.2 10e12/L    Hemoglobin 14.8 11.7 - 15.7 g/dL    Hematocrit 44.3 35.0 - 47.0 %    MCV 88 78 - 100 fl    MCH 29.4 26.5 - 33.0 pg    MCHC 33.4 31.5 - 36.5 g/dL    RDW 13.2 10.0 - 15.0 %    Platelet Count 210 150 - 450 10e9/L    Diff Method Automated Method     % Neutrophils 72.5 %    % Lymphocytes 17.4 %    % Monocytes 8.2 %    % Eosinophils 1.1 %    % Basophils 0.6 %    % Immature Granulocytes 0.2 %    Nucleated RBCs 0 0 /100    Absolute Neutrophil 7.1 1.6 - 8.3 10e9/L    Absolute Lymphocytes 1.7 0.8 - 5.3 10e9/L    Absolute Monocytes 0.8 0.0 - 1.3 10e9/L    Absolute Basophils 0.1 0.0 - 0.2 10e9/L    Abs Immature Granulocytes 0.0 0 - 0.4 10e9/L    Absolute Nucleated RBC 0.0    Comprehensive metabolic panel   Result Value Ref Range    Sodium 139 133 - 144 mmol/L    Potassium 3.7 3.4 - 5.3 mmol/L    Chloride 104 94 - 109 mmol/L    Carbon Dioxide 26 20 - 32 mmol/L    Anion Gap 9 3 - 14 mmol/L    Glucose 103 (H) 70 - 99 mg/dL    Urea Nitrogen 13 7 - 30 mg/dL    Creatinine 0.83 0.52 - 1.04 mg/dL    GFR Estimate 66 >60 mL/min/[1.73_m2]    GFR Estimate If Black 76 >60 mL/min/[1.73_m2]    Calcium 9.0 8.5 - 10.1 " mg/dL    Bilirubin Total 0.9 0.2 - 1.3 mg/dL    Albumin 3.5 3.4 - 5.0 g/dL    Protein Total 7.6 6.8 - 8.8 g/dL    Alkaline Phosphatase 92 40 - 150 U/L    ALT 17 0 - 50 U/L    AST 16 0 - 45 U/L   Lipase   Result Value Ref Range    Lipase 86 73 - 393 U/L   UA reflex to Microscopic   Result Value Ref Range    Color Urine Straw     Appearance Urine Clear     Glucose Urine Negative NEG^Negative mg/dL    Bilirubin Urine Negative NEG^Negative    Ketones Urine 5 (A) NEG^Negative mg/dL    Specific Gravity Urine 1.003 1.003 - 1.035    Blood Urine Negative NEG^Negative    pH Urine 7.0 5.0 - 7.0 pH    Protein Albumin Urine Negative NEG^Negative mg/dL    Urobilinogen mg/dL 0.0 0.0 - 2.0 mg/dL    Nitrite Urine Positive (A) NEG^Negative    Leukocyte Esterase Urine Negative NEG^Negative    Source Unspecified Urine     RBC Urine <1 0 - 2 /HPF    WBC Urine 1 0 - 5 /HPF    Bacteria Urine Few (A) NEG^Negative /HPF    Squamous Epithelial /HPF Urine 1 0 - 1 /HPF    Mucous Urine Present (A) NEG^Negative /LPF   CT Abdomen Pelvis w Contrast    Narrative    CT ABDOMEN AND PELVIS WITH CONTRAST  2/17/2019 2:37 PM    HISTORY: Abdominal pain. Suspected diverticulitis.    COMPARISON: A PET/CT on 5/29/2015.    TECHNIQUE: Routine transverse CT imaging of the abdomen and pelvis was  performed following the uneventful administration of 75mL, Isovue 370  intravenous contrast. Radiation dose for this scan was reduced using  automated exposure control, adjustment of the mA and/or kV according  to patient size, or iterative reconstruction technique.    FINDINGS: The visualized lung bases are clear. The liver, spleen,  pancreas, gallbladder, adrenal glands, kidneys, and bladder are  normal. No enlarged lymph node or other abnormal mass is demonstrated.  No free fluid is seen. No free intraperitoneal gas is identified.  There are a few scattered diverticula of the sigmoid colon. There is  mild thickening of and inflammation adjacent to the wall of  the  sigmoid. No other gastrointestinal tract abnormality is seen. There is  a normal-appearing appendix. There is calcification in the vascular  structures. There are degenerative changes of the spine. No other  osseous abnormality is seen. No abdominal or pelvic wall pathology is  demonstrated.       Impression    IMPRESSION: Mild sigmoid diverticulitis with no evidence of abscess or  perforation.       Medications   0.9% sodium chloride BOLUS (0 mLs Intravenous Stopped 2/17/19 1339)     Followed by   sodium chloride 0.9% infusion (1,000 mLs Intravenous New Bag 2/17/19 1437)   iopamidol (ISOVUE-370) solution 200 mL (75 mLs Intravenous Given 2/17/19 1421)   sodium chloride (PF) 0.9% PF flush 3 mL (3 mLs Intravenous Given 2/17/19 1421)   sodium chloride 0.9 % bag 100mL for CT scan flush use (60 mLs Intravenous Given 2/17/19 1421)     IV established and blood work is ordered.  Patient given fluids.  Urinalysis is ordered.  At 1:20 PM on recheck patient is resting.  Fluids are infusing.  Discussed results for blood work being normal.  She is able to provide a urine sample.  Her pain has been improved somewhat and is now just dull ache without sharp component.  Assessments & Plan (with Medical Decision Making)   Keyla Mari is a 78 year old female who presents to the ED complaining of abdominal pains. Patient states she has had generalized abdominal pains for the past 4 days.  Pain is moderate and sharp at times.  Her last bm was 4 days ago, the day the pain began. Patient's pain does not radiate into her chest or back. Patient has never had pain like this in the past. Patient denies history of abdominal surgeries. Patient denies urinary symptoms. No exposure to ill people. She is not nauseated. She has a history of a colonoscopy in 2007 when she was diagnosed with diverticulosis and colon polyp. Polypectomy was performed, the polyp was benign. Ulcer formed at the base of the polyp that was clipped.  Denies any  other systemic symptoms.  No chest pain or shortness of breath.  No cough.  No treatment for pain prior to arrival.  Patient defers pain meds.  On presentation patient was afebrile and vitally stable.  Exam reveals no CVA tenderness.  Abdominal exam was diffusely tender and does not localize.  No guarding or rebound.  No organomegaly or masses.  Blood work was unremarkable.  Urinalysis was unremarkable.  With her history of diverticular disease by previous colonoscopy a CT was obtained and did show diverticulitis.  No abscess evident.  Patient is given information on diverticulitis and on dietary information regarding diverticular disease.  She is given Augmentin twice daily for 7 days.  She defers pain meds.  Reasons to return to the emergency room were discussed.  She states she has an appointment on Friday with her regular doctor and have asked her to keep this.  I have reviewed the nursing notes.    I have reviewed the findings, diagnosis, plan and need for follow up with the patient.          Medication List      Started    amoxicillin-clavulanate 875-125 MG tablet  Commonly known as:  AUGMENTIN  1 tablet, Oral, 2 TIMES DAILY        ASK your doctor about these medications    sulfamethoxazole-trimethoprim 800-160 MG tablet  Commonly known as:  BACTRIM DS/SEPTRA DS  1 tablet, Oral, 2 TIMES DAILY  Ask about: Should I take this medication?            Final diagnoses:   Diverticulitis of colon     This document serves as a record of services personally performed by Alejandro Alex MD. It was created on their behalf by Oscar Newell, a trained medical scribe. The creation of this record is based on the provider's personal observations and the statements of the patient. This document has been checked and approved by the attending provider.    Note: Chart documentation done in part with Dragon Voice Recognition software. Although reviewed after completion, some word and grammatical errors may remain.    2/17/2019    [facility] EMERGENCY DEPARTMENT     Alejandro Alex MD  02/17/19 9636

## 2019-02-17 NOTE — ED TRIAGE NOTES
Presents to ED with generalized abdominal pain. Reports pain since Thursday. Last BM was Thursday.

## 2019-02-25 ENCOUNTER — OFFICE VISIT (OUTPATIENT)
Dept: FAMILY MEDICINE | Facility: CLINIC | Age: 79
End: 2019-02-25
Payer: COMMERCIAL

## 2019-02-25 VITALS
TEMPERATURE: 96.8 F | WEIGHT: 136.38 LBS | RESPIRATION RATE: 12 BRPM | HEART RATE: 55 BPM | DIASTOLIC BLOOD PRESSURE: 80 MMHG | BODY MASS INDEX: 24.16 KG/M2 | SYSTOLIC BLOOD PRESSURE: 150 MMHG | OXYGEN SATURATION: 97 %

## 2019-02-25 DIAGNOSIS — E78.5 HYPERLIPIDEMIA LDL GOAL <100: ICD-10-CM

## 2019-02-25 DIAGNOSIS — N81.11 CYSTOCELE, MIDLINE: Primary | ICD-10-CM

## 2019-02-25 DIAGNOSIS — I10 HYPERTENSION GOAL BP (BLOOD PRESSURE) < 150/90: ICD-10-CM

## 2019-02-25 PROCEDURE — 99214 OFFICE O/P EST MOD 30 MIN: CPT | Performed by: FAMILY MEDICINE

## 2019-02-25 RX ORDER — AMLODIPINE BESYLATE 5 MG/1
2.5 TABLET ORAL DAILY
Qty: 45 TABLET | Refills: 3 | Status: SHIPPED | OUTPATIENT
Start: 2019-02-25 | End: 2019-10-09

## 2019-02-25 RX ORDER — LOSARTAN POTASSIUM 100 MG/1
100 TABLET ORAL DAILY
Qty: 90 TABLET | Refills: 3 | Status: SHIPPED | OUTPATIENT
Start: 2019-02-25 | End: 2020-02-25

## 2019-02-25 RX ORDER — ATENOLOL 100 MG/1
100 TABLET ORAL 2 TIMES DAILY
Qty: 180 TABLET | Refills: 3 | Status: SHIPPED | OUTPATIENT
Start: 2019-02-25 | End: 2020-03-11

## 2019-02-25 ASSESSMENT — PAIN SCALES - GENERAL: PAINLEVEL: NO PAIN (0)

## 2019-02-25 NOTE — PROGRESS NOTES
SUBJECTIVE:   Keyla Mari is a 78 year old female who presents to clinic today for the following health issues:      Hypertension Follow-up      Outpatient blood pressures are being checked at home.  Results are systolic 130-150, diastolic 80's.    Low Salt Diet: no added salt      Amount of exercise or physical activity: None    Problems taking medications regularly: Yes,  Problems with swallowing pills    Medication side effects: none    Diet: low salt            Problem list and histories reviewed & adjusted, as indicated.  Additional history: as documented  She has had a couple 1 for paresthesias.  She thought she may be having a stroke workup was completely negative.  Any symptoms since that time but then had to go back to the emergency room on the 17th for lower abdominal pain she was diagnosed with mild diverticulitis started on Augmentin it cleared very quickly she has had no further symptoms.  Here today for blood pressure recheck.  Her blood pressure today was slightly elevated recheck was 150/80.  I did tell her that is a tolerable blood pressure for someone her age the worst thing we can do is lower too much to cause her to fall and fracture something.  Patient states that her cystocele may be getting worse it is causing a deflection of her urinary stream.  It is not painful but she wanted know if she should have something done about it I did state maybe will get a second opinion from her urologist she has seen     Patient Active Problem List   Diagnosis     Transient cerebral ischemia     Displacement of lumbar intervertebral disc without myelopathy     Gout     HYPERLIPIDEMIA LDL GOAL <100     Lumbar radiculopathy     CKD (chronic kidney disease) stage 3, GFR 30-59 ml/min (H)     Proximal humerus fracture     Advanced directives, counseling/discussion     Sleep disorder     Cerebral infarction (H)     Transient cerebral ischemia     Absence of menstruation     HTN (hypertension)      Mandibular soft tissue mass     Lung mass, spiculated in the right upper lobe, 1.1 x 2.2 x 0.6 cm.     Nodule of right lung     Hypertension goal BP (blood pressure) < 150/90     Past Surgical History:   Procedure Laterality Date     COLONOSCOPY  07      COLONOSCOPY W BIOPSY  07      DILATION/CURETTAGE DIAG/THER NON OB  1984    D & C     LOBECTOMY LUNG Right 2015    Procedure: LOBECTOMY LUNG;  Surgeon: Yariel Lund MD;  Location: [facility] OR     PHACOEMULSIFICATION WITH STANDARD INTRAOCULAR LENS IMPLANT Right 5/3/2018    Procedure: PHACOEMULSIFICATION WITH STANDARD INTRAOCULAR LENS IMPLANT;  PHACOEMULSIFICATION WITH STANDARD INTRAOCULAR LENS IMPLANT RIGHT;  Surgeon: Chevy Neves MD;  Location: [facility] OR     PHACOEMULSIFICATION WITH STANDARD INTRAOCULAR LENS IMPLANT Left 2018    Procedure: PHACOEMULSIFICATION WITH STANDARD INTRAOCULAR LENS IMPLANT;  PHACOEMULSIFICATION WITH STANDARD INTRAOCULAR LENS IMPLANT LEFT EYE;  Surgeon: Chevy Neves MD;  Location: [facility]     SURGICAL HISTORY OF -       Left leg fx--had pinning     THORACOTOMY Right 2015    Procedure: THORACOTOMY;  Surgeon: Yariel Lund MD;  Location: [facility] OR       Social History     Tobacco Use     Smoking status: Former Smoker     Last attempt to quit: 3/16/1985     Years since quittin.9     Smokeless tobacco: Never Used   Substance Use Topics     Alcohol use: No     Alcohol/week: 0.0 oz     Family History   Problem Relation Age of Onset     Hypertension Father      Heart Disease Father         Fatal MI at 64     Diabetes Mother         Fatal complication of DM at 70     Hypertension Mother      Cancer Maternal Aunt         Stomach cancer x3     Gastrointestinal Disease Maternal Uncle         Bleeding ulcer - fatal     Diabetes Paternal Grandfather      Heart Disease Paternal Uncle         MI     Heart Disease Maternal Uncle         MI - fatal     Cancer Paternal Aunt         Breast      Circulatory Paternal Aunt         Aortic aneurysm     Breast Cancer Daughter 54         Current Outpatient Medications   Medication Sig Dispense Refill     amLODIPine (NORVASC) 5 MG tablet Take 0.5 tablets (2.5 mg) by mouth daily 45 tablet 3     aspirin 81 MG tablet Take 1 tablet (81 mg) by mouth daily       atenolol (TENORMIN) 100 MG tablet Take 1 tablet (100 mg) by mouth 2 times daily 180 tablet 3     losartan (COZAAR) 100 MG tablet Take 1 tablet (100 mg) by mouth daily Please follow up before med runs out 90 tablet 3     simvastatin (ZOCOR) 20 MG tablet TAKE ONE TABLET BY MOUTH AT BEDTIME 90 tablet 3     Recent Labs   Lab Test 02/17/19  1231 02/03/19  1844 04/26/18  0812  08/03/17  0853 05/11/16  1057   LDL  --   --  116*  --  67 70   HDL  --   --  48*  --  55 50   TRIG  --   --  105  --  108 113   ALT 17 17 21   < >  --  22   CR 0.83 0.83 0.94   < >  --  0.90   GFRESTIMATED 66 66 57*   < >  --  61   GFRESTBLACK 76 77 69   < >  --  73   POTASSIUM 3.7 4.0 4.3   < >  --  3.3*   TSH  --  2.75  --   --   --   --     < > = values in this interval not displayed.        Reviewed and updated as needed this visit by clinical staff       Reviewed and updated as needed this visit by Provider         ROS:  Constitutional, HEENT, cardiovascular, pulmonary, gi and gu systems are negative, except as otherwise noted.    OBJECTIVE:     /80   Pulse 55   Temp 96.8  F (36  C) (Tympanic)   Resp 12   Wt 61.9 kg (136 lb 6 oz)   SpO2 97%   BMI 24.16 kg/m    Body mass index is 24.16 kg/m .   GENERAL: healthy, alert and no distress  RESP: lungs clear to auscultation - no rales, rhonchi or wheezes  CV: regular rate and rhythm, normal S1 S2, no S3 or S4, no murmur, click or rub, no peripheral edema and peripheral pulses strong  ABDOMEN: soft, nontender, no hepatosplenomegaly, no masses and bowel sounds normal    Diagnostic Test Results:  none     ASSESSMENT:       PLAN:   1. Hypertension goal BP (blood pressure) < 150/90  Again  a change of medications at this time.  Her blood pressure acceptable for age.  - losartan (COZAAR) 100 MG tablet; Take 1 tablet (100 mg) by mouth daily Please follow up before med runs out  Dispense: 90 tablet; Refill: 3    - amLODIPine (NORVASC) 5 MG tablet; Take 0.5 tablets (2.5 mg) by mouth daily  Dispense: 45 tablet; Refill: 3  - atenolol (TENORMIN) 100 MG tablet; Take 1 tablet (100 mg) by mouth 2 times daily  Dispense: 180 tablet; Refill: 3        -    2.  Cystocele, midline now causing deflection of her urinary stream.  Further workup and treatment plan as per urology  - UROLOGY ADULT REFERRAL            Weston Raines MD, MD  [facility]

## 2019-05-18 ENCOUNTER — APPOINTMENT (OUTPATIENT)
Dept: CT IMAGING | Facility: CLINIC | Age: 79
End: 2019-05-18
Attending: NURSE PRACTITIONER
Payer: COMMERCIAL

## 2019-05-18 ENCOUNTER — HOSPITAL ENCOUNTER (EMERGENCY)
Facility: CLINIC | Age: 79
Discharge: HOME | End: 2019-05-19
Attending: NURSE PRACTITIONER | Admitting: NURSE PRACTITIONER
Payer: COMMERCIAL

## 2019-05-18 DIAGNOSIS — R42 LIGHT-HEADED FEELING: ICD-10-CM

## 2019-05-18 DIAGNOSIS — I10 BENIGN ESSENTIAL HYPERTENSION: ICD-10-CM

## 2019-05-18 LAB
ALBUMIN SERPL-MCNC: 4.1 G/DL (ref 3.4–5)
ALP SERPL-CCNC: 122 U/L (ref 40–150)
ALT SERPL W P-5'-P-CCNC: 24 U/L (ref 0–50)
ANION GAP SERPL CALCULATED.3IONS-SCNC: 7 MMOL/L (ref 3–14)
AST SERPL W P-5'-P-CCNC: 15 U/L (ref 0–45)
BASOPHILS # BLD AUTO: 0.1 10E9/L (ref 0–0.2)
BASOPHILS NFR BLD AUTO: 0.8 %
BILIRUB SERPL-MCNC: 0.5 MG/DL (ref 0.2–1.3)
BUN SERPL-MCNC: 17 MG/DL (ref 7–30)
CALCIUM SERPL-MCNC: 9.2 MG/DL (ref 8.5–10.1)
CHLORIDE SERPL-SCNC: 107 MMOL/L (ref 94–109)
CO2 SERPL-SCNC: 27 MMOL/L (ref 20–32)
CREAT SERPL-MCNC: 0.98 MG/DL (ref 0.52–1.04)
DIFFERENTIAL METHOD BLD: NORMAL
EOSINOPHIL NFR BLD AUTO: 2 %
ERYTHROCYTE [DISTWIDTH] IN BLOOD BY AUTOMATED COUNT: 13.4 % (ref 10–15)
GFR SERPL CREATININE-BSD FRML MDRD: 54 ML/MIN/{1.73_M2}
GLUCOSE SERPL-MCNC: 122 MG/DL (ref 70–99)
HCT VFR BLD AUTO: 45.4 % (ref 35–47)
HGB BLD-MCNC: 15.3 G/DL (ref 11.7–15.7)
IMM GRANULOCYTES # BLD: 0 10E9/L (ref 0–0.4)
IMM GRANULOCYTES NFR BLD: 0.1 %
LYMPHOCYTES # BLD AUTO: 2.5 10E9/L (ref 0.8–5.3)
LYMPHOCYTES NFR BLD AUTO: 31.8 %
MAGNESIUM SERPL-MCNC: 2.5 MG/DL (ref 1.6–2.3)
MCH RBC QN AUTO: 30.4 PG (ref 26.5–33)
MCHC RBC AUTO-ENTMCNC: 33.7 G/DL (ref 31.5–36.5)
MCV RBC AUTO: 90 FL (ref 78–100)
MONOCYTES # BLD AUTO: 0.8 10E9/L (ref 0–1.3)
MONOCYTES NFR BLD AUTO: 9.7 %
NEUTROPHILS # BLD AUTO: 4.4 10E9/L (ref 1.6–8.3)
NEUTROPHILS NFR BLD AUTO: 55.6 %
NRBC # BLD AUTO: 0 10*3/UL
NRBC BLD AUTO-RTO: 0 /100
NT-PROBNP SERPL-MCNC: 286 PG/ML (ref 0–1800)
PLATELET # BLD AUTO: 199 10E9/L (ref 150–450)
POTASSIUM SERPL-SCNC: 4.3 MMOL/L (ref 3.4–5.3)
PROT SERPL-MCNC: 7.5 G/DL (ref 6.8–8.8)
RBC # BLD AUTO: 5.04 10E12/L (ref 3.8–5.2)
SODIUM SERPL-SCNC: 141 MMOL/L (ref 133–144)
TROPONIN I SERPL-MCNC: <0.015 UG/L (ref 0–0.04)
WBC # BLD AUTO: 7.8 10E9/L (ref 4–11)

## 2019-05-18 PROCEDURE — 25000128 H RX IP 250 OP 636: Performed by: NURSE PRACTITIONER

## 2019-05-18 PROCEDURE — 83735 ASSAY OF MAGNESIUM: CPT | Performed by: NURSE PRACTITIONER

## 2019-05-18 PROCEDURE — 80053 COMPREHEN METABOLIC PANEL: CPT | Performed by: NURSE PRACTITIONER

## 2019-05-18 PROCEDURE — 93010 ELECTROCARDIOGRAM REPORT: CPT | Mod: Z6 | Performed by: NURSE PRACTITIONER

## 2019-05-18 PROCEDURE — 84484 ASSAY OF TROPONIN QUANT: CPT | Performed by: NURSE PRACTITIONER

## 2019-05-18 PROCEDURE — 70450 CT HEAD/BRAIN W/O DYE: CPT

## 2019-05-18 PROCEDURE — 85025 COMPLETE CBC W/AUTO DIFF WBC: CPT | Performed by: NURSE PRACTITIONER

## 2019-05-18 PROCEDURE — 83880 ASSAY OF NATRIURETIC PEPTIDE: CPT | Performed by: NURSE PRACTITIONER

## 2019-05-18 PROCEDURE — 99285 EMERGENCY DEPT VISIT HI MDM: CPT | Mod: 25 | Performed by: NURSE PRACTITIONER

## 2019-05-18 PROCEDURE — 93005 ELECTROCARDIOGRAM TRACING: CPT

## 2019-05-18 PROCEDURE — 25000132 ZZH RX MED GY IP 250 OP 250 PS 637: Performed by: NURSE PRACTITIONER

## 2019-05-18 PROCEDURE — 96374 THER/PROPH/DIAG INJ IV PUSH: CPT

## 2019-05-18 PROCEDURE — 99285 EMERGENCY DEPT VISIT HI MDM: CPT | Mod: 25

## 2019-05-18 PROCEDURE — 96361 HYDRATE IV INFUSION ADD-ON: CPT

## 2019-05-18 RX ORDER — SIMVASTATIN 20 MG
20 TABLET ORAL ONCE
Status: COMPLETED | OUTPATIENT
Start: 2019-05-18 | End: 2019-05-18

## 2019-05-18 RX ORDER — ATENOLOL 25 MG/1
100 TABLET ORAL ONCE
Status: COMPLETED | OUTPATIENT
Start: 2019-05-18 | End: 2019-05-18

## 2019-05-18 RX ORDER — LABETALOL HYDROCHLORIDE 5 MG/ML
10 INJECTION, SOLUTION INTRAVENOUS ONCE
Status: COMPLETED | OUTPATIENT
Start: 2019-05-18 | End: 2019-05-18

## 2019-05-18 RX ORDER — LABETALOL HYDROCHLORIDE 5 MG/ML
20 INJECTION, SOLUTION INTRAVENOUS ONCE
Status: DISCONTINUED | OUTPATIENT
Start: 2019-05-18 | End: 2019-05-18 | Stop reason: DRUGHIGH

## 2019-05-18 RX ADMIN — SIMVASTATIN 20 MG: 20 TABLET, FILM COATED ORAL at 22:54

## 2019-05-18 RX ADMIN — ATENOLOL 100 MG: 25 TABLET ORAL at 22:54

## 2019-05-18 RX ADMIN — SODIUM CHLORIDE 500 ML: 9 INJECTION, SOLUTION INTRAVENOUS at 22:53

## 2019-05-18 RX ADMIN — LABETALOL HYDROCHLORIDE 10 MG: 5 INJECTION INTRAVENOUS at 21:43

## 2019-05-18 ASSESSMENT — ENCOUNTER SYMPTOMS
DIARRHEA: 0
HEADACHES: 0
WEAKNESS: 0
CARDIOVASCULAR NEGATIVE: 1
FATIGUE: 0
LIGHT-HEADEDNESS: 1
NUMBNESS: 0
NAUSEA: 0
FEVER: 0
CHILLS: 0
DIFFICULTY URINATING: 0
ABDOMINAL PAIN: 0
FACIAL ASYMMETRY: 0
BRUISES/BLEEDS EASILY: 1
BLOOD IN STOOL: 0
CONSTIPATION: 0
TREMORS: 0
TROUBLE SWALLOWING: 0
NECK STIFFNESS: 0
SINUS PRESSURE: 0
ARTHRALGIAS: 1
HEMATURIA: 0
DIAPHORESIS: 0
DYSURIA: 0
AGITATION: 0
VOMITING: 0
CONFUSION: 0
RESPIRATORY NEGATIVE: 1
SPEECH DIFFICULTY: 0

## 2019-05-18 NOTE — ED AVS SNAPSHOT
[facility] Emergency Department  [address] [place] [address]  Phone:  [phone]  Fax:  [phone]                                    Keyla Mari   MRN: [id number]    Department:  [facility] Emergency Department   Date of Visit:  5/18/2019           After Visit Summary Signature Page    I have received my discharge instructions, and my questions have been answered. I have discussed any challenges I see with this plan with the nurse or doctor.    ..........................................................................................................................................  Patient/Patient Representative Signature      ..........................................................................................................................................  Patient Representative Print Name and Relationship to Patient    ..................................................               ................................................  Date                                   Time    ..........................................................................................................................................  Reviewed by Signature/Title    ...................................................              ..............................................  Date                                               Time          22EPIC Rev 08/18

## 2019-05-19 VITALS
OXYGEN SATURATION: 94 % | HEART RATE: 67 BPM | WEIGHT: 140 LBS | BODY MASS INDEX: 24.8 KG/M2 | DIASTOLIC BLOOD PRESSURE: 90 MMHG | SYSTOLIC BLOOD PRESSURE: 176 MMHG | TEMPERATURE: 97.9 F | RESPIRATION RATE: 10 BRPM

## 2019-05-19 LAB — TROPONIN I SERPL-MCNC: <0.015 UG/L (ref 0–0.04)

## 2019-05-19 NOTE — ED PROVIDER NOTES
"  History     Chief Complaint   Patient presents with     Dizziness     HPI  Keyla Mari is a 79 year old female who has hx of HTN, cerebral infarction without residual affects, CKD stage 3, HLD, TIAs presenting with onset of continual feeling \"light-headed\" since 1800 this evening. She reports she had similar episode during the winter and was evaluated and nothing acute was noted.  She reports as she was washing her dishes this evening she sudden became light-headed and she walked to the chair and sat down. She noted that despite sitting for awhile feeling was not going away.  She reports she presently is  Feeling a little better and notes it worsens with movement. She denies feeling herself and or the room spinning and she reports she does not feel off balanced.   She denies headache, change in vision and hearing, chest pain, heart palpitations, diaphoresis, nausea and vomiting, recent falls, slurred speech, facial weakness, extremity weakness, extremity paresthesias.     It is noted her BP readings are significantly elevated on examination despite rechecking on right and left arms remains 190/100.  She reports her BP generally is 140's over 90's.  She has taken her morning doses of BP medications.     Appears she has had a NM stress test 5/11/2016 RESULTS:  Impression  1.  Myocardial perfusion imaging using single isotope technique  demonstrated normal myocardial perfusion.  2. Gated images demonstrated normal LV systolic contraction.  .  The left ventricular EF was 78% post stress  3. No previous study was available for comparison.     US carotid Bilateral 7/9/2013 which did not show significant carotid stenosis      PCP: Weston Raines       Allergies:  Allergies   Allergen Reactions     Indomethacin GI Disturbance       Problem List:    Patient Active Problem List    Diagnosis Date Noted     Cystocele, midline 02/25/2019     Priority: Medium     Hypertension goal BP (blood pressure) < 150/90 08/07/2017 "     Priority: Medium     Cerebral infarction (H) 06/25/2013     Priority: Medium     Diagnosis updated by automated process. Provider to review and confirm.       Advanced directives, counseling/discussion 07/09/2012     Priority: Medium     Has a copy already here in clinic she states.  7/9/2012        CKD (chronic kidney disease) stage 3, GFR 30-59 ml/min (H) 02/10/2011     Priority: Medium     Lumbar radiculopathy 02/09/2011     Priority: Medium     HYPERLIPIDEMIA LDL GOAL <100 10/31/2010     Priority: Medium     Gout 10/11/2010     Priority: Medium     Displacement of lumbar intervertebral disc without myelopathy 04/07/2008     Priority: Medium     Transient cerebral ischemia 01/24/2006     Priority: Medium     Problem list name updated by automated process. Provider to review          Past Medical History:    Past Medical History:   Diagnosis Date     Acute, but ill-defined, cerebrovascular disease      Closed fracture of unspecified part of neck of femur      CVA (cerebral infarction) 2005     Elevated blood pressure reading without diagnosis of hypertension      Hemorrhage of gastrointestinal tract, unspecified 08/13/2007     History of blood transfusion      HTN (hypertension)      Hyperlipidaemia      Other and unspecified hyperlipidemia      PONV (postoperative nausea and vomiting)      Unspecified cerebral artery occlusion with cerebral infarction        Past Surgical History:    Past Surgical History:   Procedure Laterality Date     COLONOSCOPY  08/13/07     HC COLONOSCOPY W BIOPSY  07/01/07     HC DILATION/CURETTAGE DIAG/THER NON OB  1984    D & C     LOBECTOMY LUNG Right 6/5/2015    Procedure: LOBECTOMY LUNG;  Surgeon: Yariel Lund MD;  Location:  OR     PHACOEMULSIFICATION WITH STANDARD INTRAOCULAR LENS IMPLANT Right 5/3/2018    Procedure: PHACOEMULSIFICATION WITH STANDARD INTRAOCULAR LENS IMPLANT;  PHACOEMULSIFICATION WITH STANDARD INTRAOCULAR LENS IMPLANT RIGHT;  Surgeon: Chevy Neves  MD Gustavo;  Location: [facility] OR     PHACOEMULSIFICATION WITH STANDARD INTRAOCULAR LENS IMPLANT Left 2018    Procedure: PHACOEMULSIFICATION WITH STANDARD INTRAOCULAR LENS IMPLANT;  PHACOEMULSIFICATION WITH STANDARD INTRAOCULAR LENS IMPLANT LEFT EYE;  Surgeon: Chevy Neves MD;  Location: [facility] OR     SURGICAL HISTORY OF -       Left leg fx--had pinning     THORACOTOMY Right 2015    Procedure: THORACOTOMY;  Surgeon: Yariel Lund MD;  Location: [facility] OR       Family History:    Family History   Problem Relation Age of Onset     Hypertension Father      Heart Disease Father         Fatal MI at 64     Diabetes Mother         Fatal complication of DM at 70     Hypertension Mother      Cancer Maternal Aunt         Stomach cancer x3     Gastrointestinal Disease Maternal Uncle         Bleeding ulcer - fatal     Diabetes Paternal Grandfather      Heart Disease Paternal Uncle         MI     Heart Disease Maternal Uncle         MI - fatal     Cancer Paternal Aunt         Breast     Circulatory Paternal Aunt         Aortic aneurysm     Breast Cancer Daughter 54       Social History:  Marital Status:   [5]  Social History     Tobacco Use     Smoking status: Former Smoker     Last attempt to quit: 3/16/1985     Years since quittin.1     Smokeless tobacco: Never Used   Substance Use Topics     Alcohol use: No     Alcohol/week: 0.0 oz     Drug use: No        Medications:      amLODIPine (NORVASC) 5 MG tablet   aspirin 81 MG tablet   atenolol (TENORMIN) 100 MG tablet   losartan (COZAAR) 100 MG tablet   simvastatin (ZOCOR) 20 MG tablet         Review of Systems   Constitutional: Negative for chills, diaphoresis, fatigue and fever.   HENT: Negative for congestion, sinus pressure and trouble swallowing.    Eyes: Negative for visual disturbance.   Respiratory: Negative.    Cardiovascular: Negative.    Gastrointestinal: Negative for abdominal pain, blood in stool, constipation, diarrhea, nausea and  vomiting.   Endocrine: Positive for cold intolerance.   Genitourinary: Negative for difficulty urinating, dysuria and hematuria.   Musculoskeletal: Positive for arthralgias. Negative for neck stiffness.   Skin: Negative.    Neurological: Positive for light-headedness. Negative for tremors, syncope, facial asymmetry, speech difficulty, weakness, numbness and headaches.   Hematological: Bruises/bleeds easily.   Psychiatric/Behavioral: Negative for agitation and confusion.       Physical Exam   BP: (!) 186/110  Pulse: 71  Temp: 97.9  F (36.6  C)  Resp: 16  Weight: 63.5 kg (140 lb)  SpO2: 97 %      Physical Exam   Constitutional: She is oriented to person, place, and time. She appears well-developed and well-nourished. No distress.   Pleasant elderly female in no distress   HENT:   Head: Normocephalic and atraumatic.   Mouth/Throat: Oropharynx is clear and moist.   Eyes: Pupils are equal, round, and reactive to light. Conjunctivae and EOM are normal.   Neck: Normal range of motion and phonation normal. Neck supple. Carotid bruit is not present.   Cardiovascular: Normal rate, regular rhythm, normal heart sounds and intact distal pulses.   Pulmonary/Chest: Effort normal and breath sounds normal.   Abdominal: Soft. Bowel sounds are normal.   Musculoskeletal: She exhibits no edema.   Neurological: She is alert and oriented to person, place, and time. She has normal strength. No cranial nerve deficit or sensory deficit. Coordination and gait normal. GCS eye subscore is 4. GCS verbal subscore is 5. GCS motor subscore is 6.   Ambulated with walker around unit without significant difficulty. She was a little lightheaded upon standing and was negative for +orthostatic Bps.    Skin: Skin is warm and dry. Capillary refill takes less than 2 seconds. She is not diaphoretic.   Psychiatric: She has a normal mood and affect. Her behavior is normal.   Nursing note and vitals reviewed.      ED Course  2217: BP improved after Labetalol.  Patient remains neurologically intact. She reports she is OK with lying down but when standing gets light headed like she is going to fall out. Awaiting orthostatics. Plan to ambulate thereafter with pulse ox/HR monitor on.  Will repeat troponin as well. She reports she has not been drinking much fluids. Will give 500ml bolus and her evening BP medication and zocor.   2230: Discussed with patient negative CT for acute stroke.     0035: Discussed with patient labs, CT, orthostatics, troponin which are unremarkable for acute process. She is initially light-headed with standing. Instructed to slowly arise from sitting to standing and wait a few seconds prior to starting gait.  Gait was steady without ataxia and no sign of intolerance during ambulation.  Instructed to come back to ER for worsening symptoms and uncontrolled BP above her baseline.         Procedures               EKG Interpretation:      Interpreted by Kacy Durham  Time reviewed: 2055  Symptoms at time of EKG: light-headed feeling   Rhythm: normal sinus   Rate: normal  Axis: normal  Ectopy: none  Conduction: normal  ST Segments/ T Waves: No ST-T wave changes  Q Waves: none  Comparison to prior: changes noted from 10/19/2017    Clinical Impression: normal EKG          Critical Care time:  none               Results for orders placed or performed during the hospital encounter of 05/18/19 (from the past 24 hour(s))   CBC with platelets differential   Result Value Ref Range    WBC 7.8 4.0 - 11.0 10e9/L    RBC Count 5.04 3.8 - 5.2 10e12/L    Hemoglobin 15.3 11.7 - 15.7 g/dL    Hematocrit 45.4 35.0 - 47.0 %    MCV 90 78 - 100 fl    MCH 30.4 26.5 - 33.0 pg    MCHC 33.7 31.5 - 36.5 g/dL    RDW 13.4 10.0 - 15.0 %    Platelet Count 199 150 - 450 10e9/L    Diff Method Automated Method     % Neutrophils 55.6 %    % Lymphocytes 31.8 %    % Monocytes 9.7 %    % Eosinophils 2.0 %    % Basophils 0.8 %    % Immature Granulocytes 0.1 %    Nucleated RBCs 0 0 /100     Absolute Neutrophil 4.4 1.6 - 8.3 10e9/L    Absolute Lymphocytes 2.5 0.8 - 5.3 10e9/L    Absolute Monocytes 0.8 0.0 - 1.3 10e9/L    Absolute Basophils 0.1 0.0 - 0.2 10e9/L    Abs Immature Granulocytes 0.0 0 - 0.4 10e9/L    Absolute Nucleated RBC 0.0    Comprehensive metabolic panel   Result Value Ref Range    Sodium 141 133 - 144 mmol/L    Potassium 4.3 3.4 - 5.3 mmol/L    Chloride 107 94 - 109 mmol/L    Carbon Dioxide 27 20 - 32 mmol/L    Anion Gap 7 3 - 14 mmol/L    Glucose 122 (H) 70 - 99 mg/dL    Urea Nitrogen 17 7 - 30 mg/dL    Creatinine 0.98 0.52 - 1.04 mg/dL    GFR Estimate 54 (L) >60 mL/min/[1.73_m2]    GFR Estimate If Black 62 >60 mL/min/[1.73_m2]    Calcium 9.2 8.5 - 10.1 mg/dL    Bilirubin Total 0.5 0.2 - 1.3 mg/dL    Albumin 4.1 3.4 - 5.0 g/dL    Protein Total 7.5 6.8 - 8.8 g/dL    Alkaline Phosphatase 122 40 - 150 U/L    ALT 24 0 - 50 U/L    AST 15 0 - 45 U/L   Troponin I   Result Value Ref Range    Troponin I ES <0.015 0.000 - 0.045 ug/L   Nt probnp inpatient (BNP)   Result Value Ref Range    N-Terminal Pro BNP Inpatient 286 0 - 1,800 pg/mL   Magnesium   Result Value Ref Range    Magnesium 2.5 (H) 1.6 - 2.3 mg/dL   CT Head w/o Contrast    Narrative    CT SCAN OF THE HEAD WITHOUT CONTRAST   5/18/2019 10:16 PM     HISTORY: TIA, initial exam. Significant elevation in /110 with  persistent feeling lightheaded/dizzy since 1800. Neurologically  intact.    TECHNIQUE:  Axial images of the head and coronal reformations without  IV contrast material. Radiation dose for this scan was reduced using  automated exposure control, adjustment of the mA and/or kV according  to patient size, or iterative reconstruction technique.    COMPARISON: 2/3/2019    FINDINGS:  There is generalized atrophy of the brain.  There is low  attenuation in the white matter of the cerebral hemispheres consistent  with sequelae of small vessel ischemic disease. There is no evidence  of intracranial hemorrhage, mass, acute infarct or  anomaly.     The visualized portions of the sinuses and mastoids appear normal.  There is no evidence of trauma.      Impression    IMPRESSION:   1. No acute abnormality.  2. Atrophy of the brain.  White matter changes consistent with  sequelae of small vessel ischemic disease.  3. No change.      TURNER TORRES MD   Troponin I (now)   Result Value Ref Range    Troponin I ES <0.015 0.000 - 0.045 ug/L       Medications   labetalol (NORMODYNE/TRANDATE) injection 10 mg (10 mg Intravenous Given 5/18/19 2143)   0.9% sodium chloride BOLUS (0 mLs Intravenous Stopped 5/18/19 1377)   atenolol (TENORMIN) tablet 100 mg (100 mg Oral Given 5/18/19 2254)   simvastatin (ZOCOR) tablet 20 mg (20 mg Oral Given 5/18/19 2254)            I have reviewed the nursing notes.    I have reviewed the findings, diagnosis, plan and need for follow up with the patient.          Medication List      There are no discharge medications for this visit.         Final diagnoses:   Light-headed feeling   Benign essential hypertension       5/18/2019   [facility] EMERGENCY DEPARTMENT     Kacy Durham, CLEMENCIA CNP  05/19/19 0041

## 2019-05-19 NOTE — DISCHARGE INSTRUCTIONS
EKG was normal  Troponin (heart enzyme) negative x2  Head CT did not show acute cause for symptoms and labs are stable    Monitor BP at home. Call Weston Raines on Monday for follow up regarding symptoms.  Come back to ER if worsening symptoms or Bp is not being controlled by your regular medications at your baseline blood pressure.

## 2019-08-26 ENCOUNTER — TELEPHONE (OUTPATIENT)
Dept: FAMILY MEDICINE | Facility: CLINIC | Age: 79
End: 2019-08-26

## 2019-08-26 NOTE — TELEPHONE ENCOUNTER
Panel Management Review      Patient has the following on her problem list:     Hypertension   Last three blood pressure readings:  BP Readings from Last 3 Encounters:   05/19/19 176/90   02/25/19 150/80   02/17/19 165/78     Blood pressure: FAILED    HTN Guidelines:  Less than 140/90      Composite cancer screening  Chart review shows that this patient is due/due soon for the following None  Summary:    Patient is due/failing the following:   BP CHECK and PHYSICAL    Action needed:   Patient needs office visit for physical or Patient needs nurse only appointment.    Type of outreach:    Phone, spoke to patient.  scheduled her for a physical for 10/09/19    Questions for provider review:    None                                                                                                                                    Norma Kim, [facility]       Chart routed to none .

## 2019-10-09 ENCOUNTER — OFFICE VISIT (OUTPATIENT)
Dept: FAMILY MEDICINE | Facility: CLINIC | Age: 79
End: 2019-10-09
Payer: COMMERCIAL

## 2019-10-09 VITALS
HEIGHT: 62 IN | DIASTOLIC BLOOD PRESSURE: 88 MMHG | HEART RATE: 54 BPM | OXYGEN SATURATION: 98 % | WEIGHT: 140.38 LBS | BODY MASS INDEX: 25.83 KG/M2 | TEMPERATURE: 97 F | SYSTOLIC BLOOD PRESSURE: 180 MMHG | RESPIRATION RATE: 14 BRPM

## 2019-10-09 DIAGNOSIS — I10 HYPERTENSION GOAL BP (BLOOD PRESSURE) < 150/90: ICD-10-CM

## 2019-10-09 DIAGNOSIS — N18.30 CKD (CHRONIC KIDNEY DISEASE) STAGE 3, GFR 30-59 ML/MIN (H): ICD-10-CM

## 2019-10-09 DIAGNOSIS — Z00.00 ENCOUNTER FOR MEDICARE ANNUAL WELLNESS EXAM: Primary | ICD-10-CM

## 2019-10-09 DIAGNOSIS — E78.5 HYPERLIPIDEMIA LDL GOAL <130: ICD-10-CM

## 2019-10-09 LAB
ANION GAP SERPL CALCULATED.3IONS-SCNC: 5 MMOL/L (ref 3–14)
BUN SERPL-MCNC: 10 MG/DL (ref 7–30)
CALCIUM SERPL-MCNC: 9.7 MG/DL (ref 8.5–10.1)
CHLORIDE SERPL-SCNC: 106 MMOL/L (ref 94–109)
CHOLEST SERPL-MCNC: 140 MG/DL
CO2 SERPL-SCNC: 29 MMOL/L (ref 20–32)
CREAT SERPL-MCNC: 0.91 MG/DL (ref 0.52–1.04)
CREAT UR-MCNC: 67 MG/DL
GFR SERPL CREATININE-BSD FRML MDRD: 59 ML/MIN/{1.73_M2}
GLUCOSE SERPL-MCNC: 106 MG/DL (ref 70–99)
HDLC SERPL-MCNC: 65 MG/DL
LDLC SERPL CALC-MCNC: 60 MG/DL
MICROALBUMIN UR-MCNC: 27 MG/L
MICROALBUMIN/CREAT UR: 39.97 MG/G CR (ref 0–25)
NONHDLC SERPL-MCNC: 75 MG/DL
POTASSIUM SERPL-SCNC: 4.2 MMOL/L (ref 3.4–5.3)
SODIUM SERPL-SCNC: 140 MMOL/L (ref 133–144)
TRIGL SERPL-MCNC: 74 MG/DL

## 2019-10-09 PROCEDURE — 80061 LIPID PANEL: CPT | Performed by: FAMILY MEDICINE

## 2019-10-09 PROCEDURE — 90662 IIV NO PRSV INCREASED AG IM: CPT | Performed by: FAMILY MEDICINE

## 2019-10-09 PROCEDURE — G0008 ADMIN INFLUENZA VIRUS VAC: HCPCS | Performed by: FAMILY MEDICINE

## 2019-10-09 PROCEDURE — 36415 COLL VENOUS BLD VENIPUNCTURE: CPT | Performed by: FAMILY MEDICINE

## 2019-10-09 PROCEDURE — 82043 UR ALBUMIN QUANTITATIVE: CPT | Performed by: FAMILY MEDICINE

## 2019-10-09 PROCEDURE — 99213 OFFICE O/P EST LOW 20 MIN: CPT | Mod: 25 | Performed by: FAMILY MEDICINE

## 2019-10-09 PROCEDURE — 99397 PER PM REEVAL EST PAT 65+ YR: CPT | Mod: 25 | Performed by: FAMILY MEDICINE

## 2019-10-09 PROCEDURE — 80048 BASIC METABOLIC PNL TOTAL CA: CPT | Performed by: FAMILY MEDICINE

## 2019-10-09 RX ORDER — SIMVASTATIN 20 MG
TABLET ORAL
Qty: 90 TABLET | Refills: 3 | Status: SHIPPED | OUTPATIENT
Start: 2019-10-09 | End: 2020-10-09

## 2019-10-09 RX ORDER — AMLODIPINE BESYLATE 5 MG/1
5 TABLET ORAL DAILY
Qty: 90 TABLET | Refills: 3 | Status: SHIPPED | OUTPATIENT
Start: 2019-10-09 | End: 2020-03-11 | Stop reason: DRUGHIGH

## 2019-10-09 ASSESSMENT — ENCOUNTER SYMPTOMS
MYALGIAS: 0
SORE THROAT: 0
BREAST MASS: 0
DYSURIA: 0
HEMATOCHEZIA: 0
COUGH: 0
NAUSEA: 0
HEMATURIA: 0
ARTHRALGIAS: 0
DIARRHEA: 0
DIZZINESS: 0
FREQUENCY: 0
HEADACHES: 0
PARESTHESIAS: 0
SHORTNESS OF BREATH: 1
CHILLS: 0
HEARTBURN: 0
PALPITATIONS: 0
ABDOMINAL PAIN: 0
CONSTIPATION: 0
NERVOUS/ANXIOUS: 0
EYE PAIN: 0
WEAKNESS: 0
JOINT SWELLING: 0
FEVER: 0

## 2019-10-09 ASSESSMENT — MIFFLIN-ST. JEOR: SCORE: 1061.34

## 2019-10-09 ASSESSMENT — ACTIVITIES OF DAILY LIVING (ADL): CURRENT_FUNCTION: NO ASSISTANCE NEEDED

## 2019-10-09 ASSESSMENT — PAIN SCALES - GENERAL: PAINLEVEL: NO PAIN (0)

## 2019-10-09 NOTE — LETTER
[address] [id number] [phone]        October 10, 2019    Keyla Mari  [address] [facility] [address]          Dear Keyla,    LAB RESULTS:     The results of your recent Tests were All your labs look fine .  If you have any further questions or problems, please contact our office at [phone].        Sincerely,        Weston Raines MD

## 2019-10-09 NOTE — PROGRESS NOTES
"SUBJECTIVE:   Keyla Mari is a 79 year old female who presents for Preventive Visit.      Are you in the first 12 months of your Medicare coverage?  No    Healthy Habits:     In general, how would you rate your overall health?  Good    Frequency of exercise:  6-7 days/week    Duration of exercise:  15-30 minutes    Do you usually eat at least 4 servings of fruit and vegetables a day, include whole grains    & fiber and avoid regularly eating high fat or \"junk\" foods?  Yes    Taking medications regularly:  Yes    Medication side effects:  None    Ability to successfully perform activities of daily living:  No assistance needed    Home Safety:  Lack of grab bars in the bathroom    Hearing Impairment:  No hearing concerns    In the past 6 months, have you been bothered by leaking of urine?  No    In general, how would you rate your overall mental or emotional health?  Good      PHQ-2 Total Score: 0    Additional concerns today:  No    Do you feel safe in your environment? Yes    Do you have a Health Care Directive? Yes: Advance Directive has been received and scanned.      Fall risk  Fallen 2 or more times in the past year?: No  Any fall with injury in the past year?: No    Cognitive Screening   1) Repeat 3 items (Leader, Season, Table)      2) Clock draw:   NORMAL  3) 3 item recall:   Recalls 2 objects   Results: NORMAL clock, 1-2 items recalled: COGNITIVE IMPAIRMENT LESS LIKELY    Mini-CogTM Copyright YESENIA Griggs. Licensed by the author for use in [facility]; reprinted with permission (madhav@.[facility]). All rights reserved.      Do you have sleep apnea, excessive snoring or daytime drowsiness?: no    Reviewed and updated as needed this visit by clinical staff  Tobacco  Allergies  Meds  Med Hx  Surg Hx  Fam Hx  Soc Hx        Reviewed and updated as needed this visit by Provider        Social History     Tobacco Use     Smoking status: Former Smoker     Packs/day: 0.00     Last attempt to quit: " 3/16/1985     Years since quittin.5     Smokeless tobacco: Never Used   Substance Use Topics     Alcohol use: No     Alcohol/week: 0.0 standard drinks     If you drink alcohol do you typically have >3 drinks per day or >7 drinks per week? No    Alcohol Use 10/9/2019   Prescreen: >3 drinks/day or >7 drinks/week? No               Current providers sharing in care for this patient include:   Patient Care Team:  Weston Raines MD as PCP - General  Weston Raines MD as Assigned PCP    The following health maintenance items are reviewed in Epic and correct as of today:  Health Maintenance   Topic Date Due     ZOSTER IMMUNIZATION (1 of 2) 1988     MEDICARE ANNUAL WELLNESS VISIT  2008     LIPID  2019     MICROALBUMIN  2019     INFLUENZA VACCINE (1) 2019     DTAP/TDAP/TD IMMUNIZATION (2 - Td) 2020 (Originally 2019)     FALL RISK ASSESSMENT  2020     BMP  2020     CREATININE  2020     ADVANCE CARE PLANNING  2022     DEXA  Completed     PHQ-2  Completed     PNEUMOCOCCAL IMMUNIZATION 65+ LOW/MEDIUM RISK  Completed     IPV IMMUNIZATION  Aged Out     MENINGITIS IMMUNIZATION  Aged Out     BP Readings from Last 3 Encounters:   10/09/19 (!) 180/88   19 176/90   19 150/80    Wt Readings from Last 3 Encounters:   10/09/19 63.7 kg (140 lb 6 oz)   19 63.5 kg (140 lb)   19 61.9 kg (136 lb 6 oz)                  Patient Active Problem List   Diagnosis     Transient cerebral ischemia     Displacement of lumbar intervertebral disc without myelopathy     Gout     HYPERLIPIDEMIA LDL GOAL <100     Lumbar radiculopathy     CKD (chronic kidney disease) stage 3, GFR 30-59 ml/min (H)     Advanced directives, counseling/discussion     Cerebral infarction (H)     Hypertension goal BP (blood pressure) < 150/90     Cystocele, midline     Past Surgical History:   Procedure Laterality Date     COLONOSCOPY  07     HC COLONOSCOPY W BIOPSY  07      HC DILATION/CURETTAGE DIAG/THER NON OB  1984    D & C     LOBECTOMY LUNG Right 2015    Procedure: LOBECTOMY LUNG;  Surgeon: Yariel Lund MD;  Location: [facility]     PHACOEMULSIFICATION WITH STANDARD INTRAOCULAR LENS IMPLANT Right 5/3/2018    Procedure: PHACOEMULSIFICATION WITH STANDARD INTRAOCULAR LENS IMPLANT;  PHACOEMULSIFICATION WITH STANDARD INTRAOCULAR LENS IMPLANT RIGHT;  Surgeon: Chevy Neves MD;  Location: [facility] OR     PHACOEMULSIFICATION WITH STANDARD INTRAOCULAR LENS IMPLANT Left 2018    Procedure: PHACOEMULSIFICATION WITH STANDARD INTRAOCULAR LENS IMPLANT;  PHACOEMULSIFICATION WITH STANDARD INTRAOCULAR LENS IMPLANT LEFT EYE;  Surgeon: Chevy Neves MD;  Location: [facility] OR     SURGICAL HISTORY OF -       Left leg fx--had pinning     THORACOTOMY Right 2015    Procedure: THORACOTOMY;  Surgeon: Yariel Lund MD;  Location: [facility] OR       Social History     Tobacco Use     Smoking status: Former Smoker     Packs/day: 0.00     Last attempt to quit: 3/16/1985     Years since quittin.5     Smokeless tobacco: Never Used   Substance Use Topics     Alcohol use: No     Alcohol/week: 0.0 standard drinks     Family History   Problem Relation Age of Onset     Hypertension Father      Heart Disease Father         Fatal MI at 64     Diabetes Mother         Fatal complication of DM at 70     Hypertension Mother      Cancer Maternal Aunt         Stomach cancer x3     Gastrointestinal Disease Maternal Uncle         Bleeding ulcer - fatal     Diabetes Paternal Grandfather      Heart Disease Paternal Uncle         MI     Heart Disease Maternal Uncle         MI - fatal     Cancer Paternal Aunt         Breast     Circulatory Paternal Aunt         Aortic aneurysm     Breast Cancer Daughter 54         Current Outpatient Medications   Medication Sig Dispense Refill     amLODIPine (NORVASC) 5 MG tablet Take 1 tablet (5 mg) by mouth daily 90 tablet 3     aspirin 81 MG tablet Take 1  "tablet (81 mg) by mouth daily       atenolol (TENORMIN) 100 MG tablet Take 1 tablet (100 mg) by mouth 2 times daily 180 tablet 3     losartan (COZAAR) 100 MG tablet Take 1 tablet (100 mg) by mouth daily Please follow up before med runs out 90 tablet 3     simvastatin (ZOCOR) 20 MG tablet TAKE ONE TABLET BY MOUTH AT BEDTIME 90 tablet 3         Review of Systems   Constitutional: Negative for chills and fever.   HENT: Negative for congestion, ear pain, hearing loss and sore throat.    Eyes: Negative for pain and visual disturbance.   Respiratory: Positive for shortness of breath. Negative for cough.    Cardiovascular: Negative for chest pain, palpitations and peripheral edema.   Gastrointestinal: Negative for abdominal pain, constipation, diarrhea, heartburn, hematochezia and nausea.   Breasts:  Negative for tenderness, breast mass and discharge.   Genitourinary: Negative for dysuria, frequency, genital sores, hematuria, pelvic pain, urgency, vaginal bleeding and vaginal discharge.   Musculoskeletal: Negative for arthralgias, joint swelling and myalgias.   Skin: Negative for rash.   Neurological: Negative for dizziness, weakness, headaches and paresthesias.   Psychiatric/Behavioral: Negative for mood changes. The patient is not nervous/anxious.      Constitutional, HEENT, cardiovascular, pulmonary, gi and gu systems are negative, except as otherwise noted.  The patient has had history of CVA in the past and TIAs.  She has had some intermittent dizziness where she has been seen in the emergency room.  She does have occasional dizziness but not had episode for a long time her blood pressure is elevated again today.  We did attempt to raise her Norvasc in the past and her pressure got low.  But she is only on 2.5 mg daily at this time so I do think it is time that we increase this cautiously.    OBJECTIVE:   BP (!) 180/88   Pulse 54   Temp 97  F (36.1  C) (Tympanic)   Resp 14   Ht 1.585 m (5' 2.4\")   Wt 63.7 kg (140 " "lb 6 oz)   SpO2 98%   BMI 25.35 kg/m   Estimated body mass index is 25.35 kg/m  as calculated from the following:    Height as of this encounter: 1.585 m (5' 2.4\").    Weight as of this encounter: 63.7 kg (140 lb 6 oz).  Physical Exam  GENERAL: healthy, alert and no distress  EYES: Eyes grossly normal to inspection, PERRL and conjunctivae and sclerae normal  HENT: ear canals and TM's normal, nose and mouth without ulcers or lesions  NECK: no adenopathy, no asymmetry, masses, or scars and thyroid normal to palpation  RESP: lungs clear to auscultation - no rales, rhonchi or wheezes  CV: regular rate and rhythm, normal S1 S2, no S3 or S4, no murmur, click or rub, no peripheral edema and peripheral pulses strong  ABDOMEN: soft, nontender, no hepatosplenomegaly, no masses and bowel sounds normal    MS: no gross musculoskeletal defects noted, no edema  NEURO: Normal strength and tone, mentation intact and speech normal  BACK: no CVA tenderness, no paralumbar tenderness  PSYCH: mentation appears normal, affect normal/bright    Results for orders placed or performed in visit on 10/09/19   Lipid panel reflex to direct LDL Fasting   Result Value Ref Range    Cholesterol 140 <200 mg/dL    Triglycerides 74 <150 mg/dL    HDL Cholesterol 65 >49 mg/dL    LDL Cholesterol Calculated 60 <100 mg/dL    Non HDL Cholesterol 75 <130 mg/dL   Albumin Random Urine Quantitative with Creat Ratio   Result Value Ref Range    Creatinine Urine 67 mg/dL    Albumin Urine mg/L 27 mg/L    Albumin Urine mg/g Cr 39.97 (H) 0 - 25 mg/g Cr   Basic metabolic panel  (Ca, Cl, CO2, Creat, Gluc, K, Na, BUN)   Result Value Ref Range    Sodium 140 133 - 144 mmol/L    Potassium 4.2 3.4 - 5.3 mmol/L    Chloride 106 94 - 109 mmol/L    Carbon Dioxide 29 20 - 32 mmol/L    Anion Gap 5 3 - 14 mmol/L    Glucose 106 (H) 70 - 99 mg/dL    Urea Nitrogen 10 7 - 30 mg/dL    Creatinine 0.91 0.52 - 1.04 mg/dL    GFR Estimate 59 (L) >60 mL/min/[1.73_m2]    GFR Estimate If Black " "69 >60 mL/min/[1.73_m2]    Calcium 9.7 8.5 - 10.1 mg/dL         ASSESSMENT / PLAN:   1. Encounter for Medicare annual wellness exam    - HC FLU VACCINE, INCREASED ANTIGEN, PRESV FREE [89979]  - amLODIPine (NORVASC) 5 MG tablet; Take 1 tablet (5 mg) by mouth daily  Dispense: 90 tablet; Refill: 3    2. Hyperlipidemia LDL goal <130    - stable     3. Hypertension goal BP (blood pressure) < 150/90  Crease her amlodipine to 5 mg daily.  She does have fairly labile pressures it is extremely high or goes extremely low so we need to be very cautious I do not want her to get dizzy and fall.  If we still have issues with blood pressure control I will have her see internal medicine for second opinion.  She will follow-up in the next couple of weeks for blood pressure recheck.  - Albumin Random Urine Quantitative with Creat Ratio  - amLODIPine (NORVASC) 5 MG tablet; Take 1 tablet (5 mg) by mouth daily  Dispense: 90 tablet; Refill: 3  - Basic metabolic panel  (Ca, Cl, CO2, Creat, Gluc, K, Na, BUN)    4. CKD (chronic kidney disease) stage 3, GFR 30-59 ml/min (H)    Stable       COUNSELING:  Reviewed preventive health counseling, as reflected in patient instructions       Regular exercise       Healthy diet/nutrition       Fall risk prevention    Estimated body mass index is 25.35 kg/m  as calculated from the following:    Height as of this encounter: 1.585 m (5' 2.4\").    Weight as of this encounter: 63.7 kg (140 lb 6 oz).         reports that she quit smoking about 34 years ago. She smoked 0.00 packs per day. She has never used smokeless tobacco.      Appropriate preventive services were discussed with this patient, including applicable screening as appropriate for cardiovascular disease, diabetes, osteopenia/osteoporosis, and glaucoma.  As appropriate for age/gender, discussed screening for colorectal cancer, prostate cancer, breast cancer, and cervical cancer. Checklist reviewing preventive services available has been given to " the patient.    Reviewed patients plan of care and provided an AVS. The Basic Care Plan (routine screening as documented in Health Maintenance) for Keyla meets the Care Plan requirement. This Care Plan has been established and reviewed with the Patient.    Counseling Resources:  ATP IV Guidelines  Pooled Cohorts Equation Calculator  Breast Cancer Risk Calculator  FRAX Risk Assessment  ICSI Preventive Guidelines  Dietary Guidelines for Americans, 2010  USDA's MyPlate  ASA Prophylaxis  Lung CA Screening    Weston Raines MD, MD  [facility]    Identified Health Risks:

## 2019-10-09 NOTE — PATIENT INSTRUCTIONS
Patient Education   Personalized Prevention Plan  You are due for the preventive services outlined below.  Your care team is available to assist you in scheduling these services.  If you have already completed any of these items, please share that information with your care team to update in your medical record.  Health Maintenance Due   Topic Date Due     Zoster (Shingles) Vaccine (1 of 2) 05/07/1988     Annual Wellness Visit  06/25/2008     Cholesterol Lab  04/26/2019     Kidney Microalbumin Urine Test  04/26/2019     Flu Vaccine (1) 09/01/2019

## 2019-10-11 ENCOUNTER — ALLIED HEALTH/NURSE VISIT (OUTPATIENT)
Dept: FAMILY MEDICINE | Facility: CLINIC | Age: 79
End: 2019-10-11
Payer: COMMERCIAL

## 2019-10-11 VITALS — DIASTOLIC BLOOD PRESSURE: 84 MMHG | SYSTOLIC BLOOD PRESSURE: 150 MMHG | HEART RATE: 60 BPM

## 2019-10-11 DIAGNOSIS — I10 HTN (HYPERTENSION): Primary | ICD-10-CM

## 2019-10-11 PROCEDURE — 99207 ZZC NO CHARGE NURSE ONLY: CPT

## 2019-10-11 ASSESSMENT — PAIN SCALES - GENERAL: PAINLEVEL: NO PAIN (0)

## 2019-10-11 NOTE — PROGRESS NOTES
BP Readings from Last 6 Encounters:   10/11/19 (!) 150/84   10/09/19 (!) 180/88   05/19/19 176/90   02/25/19 150/80   02/17/19 165/78   02/03/19 190/85     Pt came in for BP recheck today. First reading was 160/84 and s2nd reading was 150/84.  Pt states she is taking a full pill of amlodipine.    Should pt continue to take the full pill until she see's Dr. Bolaños? Please advise.  ................Jayson Robbins LPN,   October 11, 2019,      10:09 AM,   [facility]

## 2020-02-25 ENCOUNTER — TELEPHONE (OUTPATIENT)
Dept: FAMILY MEDICINE | Facility: CLINIC | Age: 80
End: 2020-02-25

## 2020-02-25 DIAGNOSIS — I10 HYPERTENSION GOAL BP (BLOOD PRESSURE) < 150/90: ICD-10-CM

## 2020-02-25 RX ORDER — LOSARTAN POTASSIUM 100 MG/1
TABLET ORAL
Qty: 30 TABLET | Refills: 0 | Status: SHIPPED | OUTPATIENT
Start: 2020-02-25 | End: 2020-03-11

## 2020-02-25 NOTE — TELEPHONE ENCOUNTER
Patient informed.  Are you able to see her in the next month before she runs out of meds or do you want us to schedule her in the next available spot?  Sunitha Gastelum, CMA

## 2020-02-25 NOTE — TELEPHONE ENCOUNTER
"PCP is currently out of office, routing to covering provider.     Cozaar  Last Written Prescription Date:  02/25/2019  Last Fill Quantity: 90,  # refills: 3   Last office visit: 10/09/2019 with prescribing provider:  Yanna   Future Office Visit:  None  Routing refill request to provider for review/approval because:  Blood pressures elevating blood pressures.     Requested Prescriptions   Pending Prescriptions Disp Refills     losartan (COZAAR) 100 MG tablet [Pharmacy Med Name: LOSARTAN POTASSIUM 100MG TABS] 90 tablet 3     Sig: TAKE ONE TABLET BY MOUTH ONCE DAILY       Angiotensin-II Receptors Failed - 2/25/2020  2:51 PM        Failed - Last blood pressure under 140/90 in past 12 months     BP Readings from Last 3 Encounters:   10/11/19 (!) 150/84   10/09/19 (!) 180/88   05/19/19 176/90           Passed - Recent (12 mo) or future (30 days) visit within the authorizing provider's specialty     Patient has had an office visit with the authorizing provider or a provider within the authorizing providers department within the previous 12 mos or has a future within next 30 days. See \"Patient Info\" tab in inbasket, or \"Choose Columns\" in Meds & Orders section of the refill encounter.          Passed - Medication is active on med list        Passed - Patient is age 18 or older        Passed - No active pregnancy on record        Passed - Normal serum creatinine on file in past 12 months     Recent Labs   Lab Test 10/09/19  1208   CR 0.91           Passed - Normal serum potassium on file in past 12 months     Recent Labs   Lab Test 10/09/19  1208   POTASSIUM 4.2           Passed - No positive pregnancy test in past 12 months      Barbi Madden RN   "

## 2020-02-27 NOTE — TELEPHONE ENCOUNTER
3. Hypertension goal BP (blood pressure) < 150/90  Crease her amlodipine to 5 mg daily.  She does have fairly labile pressures it is extremely high or goes extremely low so we need to be very cautious I do not want her to get dizzy and fall.  If we still have issues with blood pressure control I will have her see internal medicine for second opinion.  She will follow-up in the next couple of   weeks for blood pressure recheck.    Patient is scheduled for follow up with Dr. Raines on her BP-she cancelled Dr. Bolaños as she got sick and never rescheduled.   Milena SUTTON

## 2020-03-11 ENCOUNTER — OFFICE VISIT (OUTPATIENT)
Dept: FAMILY MEDICINE | Facility: CLINIC | Age: 80
End: 2020-03-11
Payer: COMMERCIAL

## 2020-03-11 ENCOUNTER — TELEPHONE (OUTPATIENT)
Dept: FAMILY MEDICINE | Facility: CLINIC | Age: 80
End: 2020-03-11

## 2020-03-11 VITALS
HEART RATE: 58 BPM | TEMPERATURE: 97.5 F | WEIGHT: 143.4 LBS | OXYGEN SATURATION: 99 % | RESPIRATION RATE: 16 BRPM | HEIGHT: 63 IN | DIASTOLIC BLOOD PRESSURE: 80 MMHG | SYSTOLIC BLOOD PRESSURE: 180 MMHG | BODY MASS INDEX: 25.41 KG/M2

## 2020-03-11 DIAGNOSIS — I10 HYPERTENSION GOAL BP (BLOOD PRESSURE) < 150/90: Primary | ICD-10-CM

## 2020-03-11 LAB
ANION GAP SERPL CALCULATED.3IONS-SCNC: 4 MMOL/L (ref 3–14)
BUN SERPL-MCNC: 11 MG/DL (ref 7–30)
CALCIUM SERPL-MCNC: 9.2 MG/DL (ref 8.5–10.1)
CHLORIDE SERPL-SCNC: 105 MMOL/L (ref 94–109)
CO2 SERPL-SCNC: 30 MMOL/L (ref 20–32)
CREAT SERPL-MCNC: 0.86 MG/DL (ref 0.52–1.04)
GFR SERPL CREATININE-BSD FRML MDRD: 63 ML/MIN/{1.73_M2}
GLUCOSE SERPL-MCNC: 116 MG/DL (ref 70–99)
POTASSIUM SERPL-SCNC: 4.2 MMOL/L (ref 3.4–5.3)
SODIUM SERPL-SCNC: 139 MMOL/L (ref 133–144)

## 2020-03-11 PROCEDURE — 36415 COLL VENOUS BLD VENIPUNCTURE: CPT | Performed by: FAMILY MEDICINE

## 2020-03-11 PROCEDURE — 99214 OFFICE O/P EST MOD 30 MIN: CPT | Performed by: FAMILY MEDICINE

## 2020-03-11 PROCEDURE — 80048 BASIC METABOLIC PNL TOTAL CA: CPT | Performed by: FAMILY MEDICINE

## 2020-03-11 RX ORDER — AMLODIPINE BESYLATE 10 MG/1
10 TABLET ORAL DAILY
Qty: 90 TABLET | Refills: 3 | Status: SHIPPED | OUTPATIENT
Start: 2020-03-11 | End: 2020-09-29 | Stop reason: DRUGHIGH

## 2020-03-11 RX ORDER — ATENOLOL 100 MG/1
100 TABLET ORAL 2 TIMES DAILY
Qty: 180 TABLET | Refills: 3 | Status: SHIPPED | OUTPATIENT
Start: 2020-03-11 | End: 2021-03-17

## 2020-03-11 RX ORDER — LOSARTAN POTASSIUM 100 MG/1
100 TABLET ORAL DAILY
Qty: 90 TABLET | Refills: 3 | Status: SHIPPED | OUTPATIENT
Start: 2020-03-11 | End: 2021-03-17

## 2020-03-11 ASSESSMENT — MIFFLIN-ST. JEOR: SCORE: 1090.94

## 2020-03-11 NOTE — PROGRESS NOTES
Subjective     Keyla Mari is a 79 year old female who presents to clinic today for the following health issues:    HPI   Hyperlipidemia Follow-Up      Are you regularly taking any medication or supplement to lower your cholesterol? YES      Are you having muscle aches or other side effects that you think could be caused by your cholesterol lowering medication?  No    Hypertension Follow-up      Do you check your blood pressure regularly outside of the clinic? Yes     Are you following a low salt diet? Yes    Are your blood pressures ever more than 140 on the top number (systolic) OR more   than 90 on the bottom number (diastolic), for example 140/90? Yes      How many servings of fruits and vegetables do you eat daily?  4 or more    On average, how many sweetened beverages do you drink each day (Examples: soda, juice, sweet tea, etc.  Do NOT count diet or artificially sweetened beverages)?   0    How many days per week do you exercise enough to make your heart beat faster? 3 or less    How many minutes a day do you exercise enough to make your heart beat faster? 9 or less    How many days per week do you miss taking your medication? 0        Patient Active Problem List   Diagnosis     Transient cerebral ischemia     Displacement of lumbar intervertebral disc without myelopathy     Gout     HYPERLIPIDEMIA LDL GOAL <100     Lumbar radiculopathy     CKD (chronic kidney disease) stage 3, GFR 30-59 ml/min (H)     Advanced directives, counseling/discussion     Cerebral infarction (H)     Hypertension goal BP (blood pressure) < 150/90     Cystocele, midline     Past Surgical History:   Procedure Laterality Date     COLONOSCOPY  08/13/07     HC COLONOSCOPY W BIOPSY  07/01/07     HC DILATION/CURETTAGE DIAG/THER NON OB  1984    D & C     LOBECTOMY LUNG Right 6/5/2015    Procedure: LOBECTOMY LUNG;  Surgeon: Yariel Lund MD;  Location: [facility] OR     PHACOEMULSIFICATION WITH STANDARD INTRAOCULAR LENS IMPLANT Right  5/3/2018    Procedure: PHACOEMULSIFICATION WITH STANDARD INTRAOCULAR LENS IMPLANT;  PHACOEMULSIFICATION WITH STANDARD INTRAOCULAR LENS IMPLANT RIGHT;  Surgeon: Chevy Neves MD;  Location: [facility] OR     PHACOEMULSIFICATION WITH STANDARD INTRAOCULAR LENS IMPLANT Left 2018    Procedure: PHACOEMULSIFICATION WITH STANDARD INTRAOCULAR LENS IMPLANT;  PHACOEMULSIFICATION WITH STANDARD INTRAOCULAR LENS IMPLANT LEFT EYE;  Surgeon: Chevy Neves MD;  Location: [facility] OR     SURGICAL HISTORY OF -       Left leg fx--had pinning     THORACOTOMY Right 2015    Procedure: THORACOTOMY;  Surgeon: Yariel Lund MD;  Location:  OR       Social History     Tobacco Use     Smoking status: Former Smoker     Packs/day: 0.00     Last attempt to quit: 3/16/1985     Years since quittin.0     Smokeless tobacco: Never Used   Substance Use Topics     Alcohol use: No     Alcohol/week: 0.0 standard drinks     Family History   Problem Relation Age of Onset     Hypertension Father      Heart Disease Father         Fatal MI at 64     Diabetes Mother         Fatal complication of DM at 70     Hypertension Mother      Cancer Maternal Aunt         Stomach cancer x3     Gastrointestinal Disease Maternal Uncle         Bleeding ulcer - fatal     Diabetes Paternal Grandfather      Heart Disease Paternal Uncle         MI     Heart Disease Maternal Uncle         MI - fatal     Cancer Paternal Aunt         Breast     Circulatory Paternal Aunt         Aortic aneurysm     Breast Cancer Daughter 54         Current Outpatient Medications   Medication Sig Dispense Refill     amLODIPine (NORVASC) 5 MG tablet Take 1 tablet (5 mg) by mouth daily 90 tablet 3     aspirin 81 MG tablet Take 1 tablet (81 mg) by mouth daily       atenolol (TENORMIN) 100 MG tablet Take 1 tablet (100 mg) by mouth 2 times daily 180 tablet 3     losartan (COZAAR) 100 MG tablet TAKE ONE TABLET BY MOUTH ONCE DAILY 30 tablet 0     simvastatin (ZOCOR) 20 MG  "tablet TAKE ONE TABLET BY MOUTH AT BEDTIME 90 tablet 3       Reviewed and updated as needed this visit by Provider         Review of Systems   ROS COMP: Constitutional, HEENT, cardiovascular, pulmonary, gi and gu systems are negative, except as otherwise noted.      Objective    BP (!) 180/88   Pulse 58   Temp 97.5  F (36.4  C) (Temporal)   Resp 16   Ht 1.61 m (5' 3.4\")   Wt 65 kg (143 lb 6.4 oz)   LMP  (LMP Unknown)   SpO2 99%   BMI 25.08 kg/m    Body mass index is 25.08 kg/m .  Physical Exam   GENERAL: healthy, alert and no distress  NECK: no adenopathy, no asymmetry, masses, or scars and thyroid normal to palpation  RESP: lungs clear to auscultation - no rales, rhonchi or wheezes  CV: regular rate and rhythm, normal S1 S2, no S3 or S4, no murmur, click or rub, no peripheral edema and peripheral pulses strong  PSYCH: mentation appears normal, affect normal/bright    Diagnostic Test Results:  Results for orders placed or performed in visit on 03/11/20 (from the past 24 hour(s))   Basic metabolic panel  (Ca, Cl, CO2, Creat, Gluc, K, Na, BUN)   Result Value Ref Range    Sodium 139 133 - 144 mmol/L    Potassium 4.2 3.4 - 5.3 mmol/L    Chloride 105 94 - 109 mmol/L    Carbon Dioxide 30 20 - 32 mmol/L    Anion Gap 4 3 - 14 mmol/L    Glucose 116 (H) 70 - 99 mg/dL    Urea Nitrogen 11 7 - 30 mg/dL    Creatinine 0.86 0.52 - 1.04 mg/dL    GFR Estimate 63 >60 mL/min/[1.73_m2]    GFR Estimate If Black 74 >60 mL/min/[1.73_m2]    Calcium 9.2 8.5 - 10.1 mg/dL           Assessment & Plan   Assessment      Plan  1. Hypertension goal BP (blood pressure) < 150/90  Will increase  her amlodipine to 10 mg.  Check blood pressures in about 2 to 4 weeks.  - amLODIPine (NORVASC) 10 MG tablet; Take 1 tablet (10 mg) by mouth daily  Dispense: 90 tablet; Refill: 3  - losartan (COZAAR) 100 MG tablet; Take 1 tablet (100 mg) by mouth daily  Dispense: 90 tablet; Refill: 3  - atenolol (TENORMIN) 100 MG tablet; Take 1 tablet (100 mg) by mouth " 2 times daily  Dispense: 180 tablet; Refill: 3  - Basic metabolic panel  (Ca, Cl, CO2, Creat, Gluc, K, Na, BUN)    Andrews studies showed a mildly elevated glucose not concerned with that at this time.            Weston Raines MD  [facility]

## 2020-03-11 NOTE — TELEPHONE ENCOUNTER
Called and set this up.  Norma Kim, [facility]        Per RF- she needs follow up appt for bp recheck with him in 2-3 weeks.

## 2020-03-11 NOTE — NURSING NOTE
Her bp recheck was missed by staff. Yanna had me call her and set her up for a follow up appt for hypertension recheck.  Norma Kim, [facility]

## 2020-03-17 ENCOUNTER — VIRTUAL VISIT (OUTPATIENT)
Dept: FAMILY MEDICINE | Facility: CLINIC | Age: 80
End: 2020-03-17
Payer: COMMERCIAL

## 2020-03-17 DIAGNOSIS — I10 HYPERTENSION GOAL BP (BLOOD PRESSURE) < 140/90: Primary | ICD-10-CM

## 2020-03-17 PROCEDURE — 99442 ZZC PHYSICIAN TELEPHONE EVALUATION 11-20 MIN: CPT | Performed by: FAMILY MEDICINE

## 2020-03-17 NOTE — PROGRESS NOTES
"Keyla Mari is a 79 year old female who is being evaluated via a billable telephone visit.      The patient has been notified of following:     \"This telephone visit will be conducted via a call between you and your physician/provider. We have found that certain health care needs can be provided without the need for a physical exam.  This service lets us provide the care you need with a short phone conversation.  If a prescription is necessary we can send it directly to your pharmacy.  If lab work is needed we can place an order for that and you can then stop by our lab to have the test done at a later time.    If during the course of the call the physician/provider feels a telephone visit is not appropriate, you will not be charged for this service.\"       Keyla Mari complains of      Patient does have some mild foot swelling now that we have gone to 10 mg of the amlodipine.  She is going to try it for another week make sure it does not get worse.  If it stays persistent I am to have her cut back to 5 mg daily because she had no symptoms or foot swelling at 5 mg.  Then she will contact me by phone.  No headache no blurred vision no any other symptoms associated with elevated blood pressure.  With her age I did tell her shelter in place her family is doing all her grocery shopping she is not going out and about she is going to stay in her home which is the safest.  I did tell her not to invite anyone into her home as people can be asymptomatic and still carry the virus.  Has been practicing this already.      Chief Complaint   Patient presents with     Telephone       I have reviewed and updated the patient's Past Medical History, Social History, Family History and Medication List.    ALLERGIES  Indomethacin    Norma Kim, [facility]   (MA signature)    Additional provider notes:       Assessment/Plan:  (I10) Hypertension goal BP (blood pressure) < 150/90  (primary encounter " diagnosis)    Plan:   Patient will stay on her 10 mg for another week or 2 if her foot swelling persists I will have her cut back to 5 she had no swelling with 5 mg then we will have to reassess for alternative medication but we may need to start those over the phone as she is 79 years old and I do not want her coming into the clinic.  She will contact me in a week.    I have reviewed the note as documented above.  This accurately captures the substance of my conversation with the patient.      Phone call contact time  Call Started at 1330  Call Ended at 1342     Weston Raines MD

## 2020-09-29 ENCOUNTER — OFFICE VISIT (OUTPATIENT)
Dept: FAMILY MEDICINE | Facility: CLINIC | Age: 80
End: 2020-09-29
Payer: COMMERCIAL

## 2020-09-29 ENCOUNTER — HOSPITAL ENCOUNTER (OUTPATIENT)
Dept: ULTRASOUND IMAGING | Facility: CLINIC | Age: 80
Discharge: HOME | End: 2020-09-29
Attending: FAMILY MEDICINE | Admitting: FAMILY MEDICINE
Payer: COMMERCIAL

## 2020-09-29 VITALS
TEMPERATURE: 97.5 F | WEIGHT: 146 LBS | BODY MASS INDEX: 25.54 KG/M2 | DIASTOLIC BLOOD PRESSURE: 80 MMHG | SYSTOLIC BLOOD PRESSURE: 152 MMHG | OXYGEN SATURATION: 97 % | HEART RATE: 60 BPM | RESPIRATION RATE: 14 BRPM

## 2020-09-29 DIAGNOSIS — M79.89 SWELLING OF CALF: Primary | ICD-10-CM

## 2020-09-29 DIAGNOSIS — M79.89 SWELLING OF CALF: ICD-10-CM

## 2020-09-29 DIAGNOSIS — L98.9 SCALP LESION: ICD-10-CM

## 2020-09-29 DIAGNOSIS — I10 HYPERTENSION GOAL BP (BLOOD PRESSURE) < 150/90: ICD-10-CM

## 2020-09-29 LAB
CRP SERPL-MCNC: <2.9 MG/L (ref 0–8)
D DIMER PPP FEU-MCNC: 0.3 UG/ML FEU (ref 0–0.5)
ERYTHROCYTE [SEDIMENTATION RATE] IN BLOOD BY WESTERGREN METHOD: 7 MM/H (ref 0–30)

## 2020-09-29 PROCEDURE — 86140 C-REACTIVE PROTEIN: CPT | Performed by: FAMILY MEDICINE

## 2020-09-29 PROCEDURE — 99214 OFFICE O/P EST MOD 30 MIN: CPT | Performed by: FAMILY MEDICINE

## 2020-09-29 PROCEDURE — 36415 COLL VENOUS BLD VENIPUNCTURE: CPT | Performed by: FAMILY MEDICINE

## 2020-09-29 PROCEDURE — 85379 FIBRIN DEGRADATION QUANT: CPT | Performed by: FAMILY MEDICINE

## 2020-09-29 PROCEDURE — 93971 EXTREMITY STUDY: CPT | Mod: RT

## 2020-09-29 PROCEDURE — 85652 RBC SED RATE AUTOMATED: CPT | Performed by: FAMILY MEDICINE

## 2020-09-29 RX ORDER — AMLODIPINE BESYLATE 5 MG/1
5 TABLET ORAL DAILY
Qty: 90 TABLET | Refills: 3 | Status: SHIPPED | OUTPATIENT
Start: 2020-09-29 | End: 2021-09-22

## 2020-09-29 ASSESSMENT — PAIN SCALES - GENERAL: PAINLEVEL: MODERATE PAIN (5)

## 2020-09-29 NOTE — PROGRESS NOTES
Subjective     Keyla Mari is a 80 year old female who presents to clinic today for the following health issues:    HPI       Musculoskeletal problem/pain  Onset/Duration: 3 weeks  Description  Location: leg - right  Joint Swelling: YES  Redness: no  Pain: YES  Warmth: YES  Intensity:  5/10  Progression of Symptoms:  same  Accompanying signs and symptoms:   Fevers: no  Numbness/tingling/weakness: no  History  Trauma to the area: no  Recent illness:  no  Previous similar problem: no  Previous evaluation:  no  Precipitating or alleviating factors:  Aggravating factors include: standing and lifting  Therapies tried and outcome: ice    Patient is here today she fell a few weeks ago and over the past couple weeks has noticed some swelling in her right calf notes aggravating to walk because of the swelling.  She is not had a history of DVT.  No history of cancer to put her at risk for coagulopathy.  So has a lesion on her scalp which is been growing on the left side.  Complaints at this time.      Review of Systems   Constitutional, HEENT, cardiovascular, pulmonary, gi and gu systems are negative, except as otherwise noted.      Objective    BP (!) 152/80   Pulse 60   Temp 97.5  F (36.4  C) (Temporal)   Resp 14   Wt 66.2 kg (146 lb)   LMP  (LMP Unknown)   SpO2 97%   BMI 25.54 kg/m    Body mass index is 25.54 kg/m .  Physical Exam   GENERAL: healthy, alert and no distress  MS: Patient has some slight swelling of the right calf.  Slightly tender to touch.  Homans sign was negative.  SKIN:  patient has a hypertrophic lesion measuring about 1 x 2 cm with increased vascularity consistent with a basal cell carcinoma or a squamous cell carcinoma in appearance    Right lower extremity Doppler negative for DVT        Assessment & Plan     Swelling of calf  No evidence of a DVT we will treat this with warm heat at night for the next week to 2 weeks it should resolve with time slowly.  - US Lower Extremity Venous Duplex  Right; Future  - ESR: Erythrocyte sedimentation rate  - CRP, inflammation  - D dimer, quantitative    Hypertension goal BP (blood pressure) < 150/90  Slightly elevated but normal for age on medication change her medication at this time  - amLODIPine (NORVASC) 5 MG tablet; Take 1 tablet (5 mg) by mouth daily    Scalp lesion  I do believe this represents a skin cancer we will get her into dermatology for evaluation and excision.  I do believe she will need a Mohs procedure  - DERMATOLOGY ADULT REFERRAL; Future       Weston Raines MD  [facility]

## 2020-10-05 ENCOUNTER — OFFICE VISIT (OUTPATIENT)
Dept: DERMATOLOGY | Facility: CLINIC | Age: 80
End: 2020-10-05
Attending: FAMILY MEDICINE
Payer: COMMERCIAL

## 2020-10-05 ENCOUNTER — TELEPHONE (OUTPATIENT)
Dept: DERMATOLOGY | Facility: CLINIC | Age: 80
End: 2020-10-05

## 2020-10-05 VITALS — SYSTOLIC BLOOD PRESSURE: 198 MMHG | HEART RATE: 60 BPM | OXYGEN SATURATION: 98 % | DIASTOLIC BLOOD PRESSURE: 96 MMHG

## 2020-10-05 DIAGNOSIS — C44.319 BASAL CELL CARCINOMA (BCC) OF FOREHEAD: ICD-10-CM

## 2020-10-05 DIAGNOSIS — L82.1 SEBORRHEIC KERATOSIS: ICD-10-CM

## 2020-10-05 DIAGNOSIS — L57.0 AK (ACTINIC KERATOSIS): ICD-10-CM

## 2020-10-05 DIAGNOSIS — D18.01 ANGIOMA OF SKIN: ICD-10-CM

## 2020-10-05 DIAGNOSIS — L81.4 LENTIGO: ICD-10-CM

## 2020-10-05 DIAGNOSIS — C44.41 BASAL CELL CARCINOMA (BCC) OF SCALP: Primary | ICD-10-CM

## 2020-10-05 DIAGNOSIS — L98.9 SCALP LESION: ICD-10-CM

## 2020-10-05 DIAGNOSIS — D23.9 DERMAL NEVUS: ICD-10-CM

## 2020-10-05 PROCEDURE — 11102 TANGNTL BX SKIN SINGLE LES: CPT | Performed by: DERMATOLOGY

## 2020-10-05 PROCEDURE — 99203 OFFICE O/P NEW LOW 30 MIN: CPT | Mod: 25 | Performed by: DERMATOLOGY

## 2020-10-05 PROCEDURE — 11103 TANGNTL BX SKIN EA SEP/ADDL: CPT | Performed by: DERMATOLOGY

## 2020-10-05 PROCEDURE — 88331 PATH CONSLTJ SURG 1 BLK 1SPC: CPT | Performed by: DERMATOLOGY

## 2020-10-05 NOTE — NURSING NOTE
"Initial BP (!) 198/96   Pulse 60   LMP  (LMP Unknown)   SpO2 98%  Estimated body mass index is 25.54 kg/m  as calculated from the following:    Height as of 3/11/20: 1.61 m (5' 3.4\").    Weight as of 9/29/20: 66.2 kg (146 lb). .    "

## 2020-10-05 NOTE — PROGRESS NOTES
Keyla Mari is a 80 year old year old female patient here today for spot on scalp and face.   .  Patient states this has been present for a while.  Patient reports the following symptoms:  Not healing.  Patient reports the following previous treatments none.  These treatments did not work.  Patient reports the following modifying factors none.  Associated symptoms: none.  Patient has no other skin complaints today.  Remainder of the HPI, Meds, PMH, Allergies, FH, and SH was reviewed in chart.      Past Medical History:   Diagnosis Date     Acute, but ill-defined, cerebrovascular disease      Closed fracture of unspecified part of neck of femur     Hip fracture     CVA (cerebral infarction) 2005     Elevated blood pressure reading without diagnosis of hypertension      Hemorrhage of gastrointestinal tract, unspecified 08/13/2007    Transfer to [facility]     History of blood transfusion     after colonoscopy     HTN (hypertension)      Hyperlipidaemia      Other and unspecified hyperlipidemia      PONV (postoperative nausea and vomiting)      Unspecified cerebral artery occlusion with cerebral infarction        Past Surgical History:   Procedure Laterality Date     COLONOSCOPY  08/13/07     HC COLONOSCOPY W BIOPSY  07/01/07      DILATION/CURETTAGE DIAG/THER NON OB  1984    D & C     LOBECTOMY LUNG Right 6/5/2015    Procedure: LOBECTOMY LUNG;  Surgeon: Yariel Lund MD;  Location: [facility] OR     PHACOEMULSIFICATION WITH STANDARD INTRAOCULAR LENS IMPLANT Right 5/3/2018    Procedure: PHACOEMULSIFICATION WITH STANDARD INTRAOCULAR LENS IMPLANT;  PHACOEMULSIFICATION WITH STANDARD INTRAOCULAR LENS IMPLANT RIGHT;  Surgeon: Chevy Neves MD;  Location: [facility] OR     PHACOEMULSIFICATION WITH STANDARD INTRAOCULAR LENS IMPLANT Left 5/17/2018    Procedure: PHACOEMULSIFICATION WITH STANDARD INTRAOCULAR LENS IMPLANT;  PHACOEMULSIFICATION WITH STANDARD INTRAOCULAR LENS IMPLANT LEFT EYE;  Surgeon: Chevy Neves  MD Gustavo;  Location: [facility] OR     SURGICAL HISTORY OF -       Left leg fx--had pinning     THORACOTOMY Right 2015    Procedure: THORACOTOMY;  Surgeon: aYriel Lund MD;  Location:  OR        Family History   Problem Relation Age of Onset     Hypertension Father      Heart Disease Father         Fatal MI at 64     Diabetes Mother         Fatal complication of DM at 70     Hypertension Mother      Cancer Maternal Aunt         Stomach cancer x3     Gastrointestinal Disease Maternal Uncle         Bleeding ulcer - fatal     Diabetes Paternal Grandfather      Heart Disease Paternal Uncle         MI     Heart Disease Maternal Uncle         MI - fatal     Cancer Paternal Aunt         Breast     Circulatory Paternal Aunt         Aortic aneurysm     Breast Cancer Daughter 54       Social History     Socioeconomic History     Marital status:      Spouse name: Not on file     Number of children: Not on file     Years of education: Not on file     Highest education level: Not on file   Occupational History     Not on file   Social Needs     Financial resource strain: Not on file     Food insecurity     Worry: Not on file     Inability: Not on file     Transportation needs     Medical: Not on file     Non-medical: Not on file   Tobacco Use     Smoking status: Former Smoker     Packs/day: 0.00     Quit date: 3/16/1985     Years since quittin.5     Smokeless tobacco: Never Used   Substance and Sexual Activity     Alcohol use: No     Alcohol/week: 0.0 standard drinks     Drug use: No     Sexual activity: Never   Lifestyle     Physical activity     Days per week: Not on file     Minutes per session: Not on file     Stress: Not on file   Relationships     Social connections     Talks on phone: Not on file     Gets together: Not on file     Attends Yarsanism service: Not on file     Active member of club or organization: Not on file     Attends meetings of clubs or organizations: Not on file      Relationship status: Not on file     Intimate partner violence     Fear of current or ex partner: Not on file     Emotionally abused: Not on file     Physically abused: Not on file     Forced sexual activity: Not on file   Other Topics Concern     Parent/sibling w/ CABG, MI or angioplasty before 65F 55M? Not Asked   Social History Narrative     Not on file       Outpatient Encounter Medications as of 10/5/2020   Medication Sig Dispense Refill     amLODIPine (NORVASC) 5 MG tablet Take 1 tablet (5 mg) by mouth daily 90 tablet 3     aspirin 81 MG tablet Take 1 tablet (81 mg) by mouth daily       atenolol (TENORMIN) 100 MG tablet Take 1 tablet (100 mg) by mouth 2 times daily 180 tablet 3     losartan (COZAAR) 100 MG tablet Take 1 tablet (100 mg) by mouth daily 90 tablet 3     simvastatin (ZOCOR) 20 MG tablet TAKE ONE TABLET BY MOUTH AT BEDTIME 90 tablet 3     No facility-administered encounter medications on file as of 10/5/2020.              Review Of Systems  Skin: As above  Eyes: negative  Ears/Nose/Throat: negative  Respiratory: No shortness of breath, dyspnea on exertion, cough, or hemoptysis  Cardiovascular: negative  Gastrointestinal: negative  Genitourinary: negative  Musculoskeletal: negative  Neurologic: negative  Psychiatric: negative  Hematologic/Lymphatic/Immunologic: negative  Endocrine: negative      O:   NAD, WDWN, Alert & Oriented, Mood & Affect wnl, Vitals stable   Here today alone   BP (!) 198/96   Pulse 60   LMP  (LMP Unknown)   SpO2 98%    General appearance normal   Vitals stable   Alert, oriented and in no acute distress      Following lymph nodes palpated: Occipital, Cervical, Supraclavicular no lad   Vertex scalp 1.3cm pink pearly papule    Mid forehead white scarred 1.1cm red pink pearly papule    R forehead 8mm scaly papule        Stuck on papules and brown macules on trunk and ext   Red papules on trunk  Flesh colored papules on trunk     The remainder of expanded problem focused exam was  normal; the following areas were examined:  scalp/hair, conjunctiva/lids, face, neck, lips, chest, digits/nails, RUE, LUE.      Eyes: Conjunctivae/lids:Normal     ENT: Lips, buccal mucosa, tongue: normal    MSK:Normal    Cardiovascular: peripheral edema none    Pulm: Breathing Normal    Lymph Nodes: No Head and Neck Lymphadenopathy     Neuro/Psych: Orientation:Alert and Orientedx3 ; Mood/Affect:normal       MICRO:     Vertex scalp:Orthokeratosis of epidermis with a proliferation of nests of basaloid cells, with peripheral palisading and a haphazard arrangement in the center extending into the dermis, forming nodules.  The tumor cells have hyperchromatic nuclei. Poor cytoplasm and intercellular bridging.    Mid forehead:Orthokeratosis of epidermis with a proliferation of nests of basaloid cells, with peripheral palisading and a haphazard arrangement in the center extending into the dermis, forming nodules.  The tumor cells have hyperchromatic nuclei. Poor cytoplasm and intercellular bridging.    R forehead:There is hyperkeratosis and focal parakeratosis of the epidermis, overlying atypical keratinocytes,  by areas of orthokeratosis, there are scattered basal atypical keratinocytes: with varying degrees of overlying loss of maturation, hyperchromatism, pleomorphism, increased and abnormal mitoses, dyskeratosis.  The dermis shows a variable inflammatory infiltrate.   A/P:  1. Seborrheic keratosis, lentigo, angioma, dermal nevus  2. R/o basal cell carcinoma   TANGENTIAL BIOPSY IN HOUSE:  After consent, anesthesia with LEC and prep, tangential excision performed and dx above confirmed with frozen section histology.  No complications and routine wound care.      I have personally reviewed all specimens and/or slides and used them with my medical judgement to determine or confirm the final diagnosis.     Patient told result   Vertex scalp basal cell carcinoma schedule excision  Mid forheead basal cell carcinoma  schedule excision  r forehead actinic keratosis cryo       BENIGN LESIONS DISCUSSED WITH PATIENT:  I discussed the specifics of tumor, prognosis, and genetics of benign lesions.  I explained that treatment of these lesions would be purely cosmetic and not medically neccessary.  I discussed with patient different removal options including excision, cautery and /or laser.      Nature and genetics of benign skin lesions dicussed with patient.  Signs and Symptoms of skin cancer discussed with patient.  ABCDEs of melanoma reviewed with patient.  Patient encouraged to perform monthly skin exams.  UV precautions reviewed with patient.  Skin care regimen reviewed with patient: Eliminate harsh soaps, i.e. Dial, zest, irsih spring; Mild soaps such as Cetaphil or Dove sensitive skin, avoid hot or cold showers, aggressive use of emollients including vanicream, cetaphil or cerave discussed with patient.    Risks of non-melanoma skin cancer discussed with patient   Return to clinic next apopt

## 2020-10-05 NOTE — PATIENT INSTRUCTIONS
Wound Care Instructions     FOR SUPERFICIAL WOUNDS     [facility] [phone]    [facility] [phone]                       AFTER 24 HOURS YOU SHOULD REMOVE THE BANDAGE AND BEGIN DAILY DRESSING CHANGES AS FOLLOWS:     1) Remove Dressing.     2) Clean and dry the area with tap water using a Q-tip or sterile gauze pad.     3) Apply Vaseline, Aquaphor, Polysporin ointment or Bacitracin ointment over entire wound.  Do NOT use Neosporin ointment.     4) Cover the wound with a band-aid, or a sterile non-stick gauze pad and micropore paper tape      REPEAT THESE INSTRUCTIONS AT LEAST ONCE A DAY UNTIL THE WOUND HAS COMPLETELY HEALED.    It is an old wives tale that a wound heals better when it is exposed to air and allowed to dry out. The wound will heal faster with a better cosmetic result if it is kept moist with ointment and covered with a bandage.    **Do not let the wound dry out.**      Supplies Needed:      *Cotton tipped applicators (Q-tips)    *Polysporin Ointment or Bacitracin Ointment (NOT NEOSPORIN)    *Band-aids or non-stick gauze pads and micropore paper tape.      PATIENT INFORMATION:    During the healing process you will notice a number of changes. All wounds develop a small halo of redness surrounding the wound.  This means healing is occurring. Severe itching with extensive redness usually indicates sensitivity to the ointment or bandage tape used to dress the wound.  You should call our office if this develops.      Swelling  and/or discoloration around your surgical site is common, particularly when performed around the eye.    All wounds normally drain.  The larger the wound the more drainage there will be.  After 7-10 days, you will notice the wound beginning to shrink and new skin will begin to grow.  The wound is healed when you can see skin has formed over the entire area.  A healed wound has a healthy, shiny look to the surface and is red to dark pink in color  to normalize.  Wounds may take approximately 4-6 weeks to heal.  Larger wounds may take 6-8 weeks.  After the wound is healed you may discontinue dressing changes.    You may experience a sensation of tightness as your wound heals. This is normal and will gradually subside.    Your healed wound may be sensitive to temperature changes. This sensitivity improves with time, but if you re having a lot of discomfort, try to avoid temperature extremes.    Patients frequently experience itching after their wound appears to have healed because of the continue healing under the skin.  Plain Vaseline will help relieve the itching.        POSSIBLE COMPLICATIONS    BLEEDIN. Leave the bandage in place.  2. Use tightly rolled up gauze or a cloth to apply direct pressure over the bandage for 30  minutes.  3. Reapply pressure for an additional 30 minutes if necessary  4. Use additional gauze and tape to maintain pressure once the bleeding has stopped.

## 2020-10-05 NOTE — LETTER
[facility] [place]  [address] [facility] [facility] [address]  Phone: [phone]    October 5, 2020    Keyla KATHARINE Kamaljit                                                                                                              [address] [facility] [address]            Dear Ms. Ankushmeghana,    You are scheduled for Mohs Surgery on:    Tuesday October 13 th at 7:45 am.   Tuesday October 20 th at 8:00 am.    Please check in at Dermatology Clinic.   (2nd Floor, last  Clinic on right up staircase or elevator -past OB/GYN clinic)    You don't need to arrive more than 5-10 minutes prior to your appointment time.     Be sure to eat a good breakfast and bathe and wash your hair prior to Surgery.    If you are taking any anti-coagulants that are prescribed by your Doctor (such as Coumadin/warfarin, Plavix, Aspirin, Ibuprofen), please continue taking them.     However, If you are taking anti-coagulants over the counter without  a Doctor's order for a Medical condition, please discontinue them 10 days prior to Surgery.      Please wear loose comfortable clothing as it could possibly be 4-6 hours until your surgery is completed depending upon how many layers of tissue need to be removed.     Wi-fi access is available.     Thank you,      Diego Coon MD/ Erma Shankar RN

## 2020-10-05 NOTE — LETTER
10/5/2020         RE: Keyla Mari  [address] [facility] [address]        Dear Colleague,    Thank you for referring your patient, Keyla Mari, to the [facility]. Please see a copy of my visit note below.    Keyla Mari is a 80 year old year old female patient here today for spot on scalp and face.   .  Patient states this has been present for a while.  Patient reports the following symptoms:  Not healing.  Patient reports the following previous treatments none.  These treatments did not work.  Patient reports the following modifying factors none.  Associated symptoms: none.  Patient has no other skin complaints today.  Remainder of the HPI, Meds, PMH, Allergies, FH, and SH was reviewed in chart.      Past Medical History:   Diagnosis Date     Acute, but ill-defined, cerebrovascular disease      Closed fracture of unspecified part of neck of femur     Hip fracture     CVA (cerebral infarction) 2005     Elevated blood pressure reading without diagnosis of hypertension      Hemorrhage of gastrointestinal tract, unspecified 08/13/2007    Transfer to [facility]     History of blood transfusion     after colonoscopy     HTN (hypertension)      Hyperlipidaemia      Other and unspecified hyperlipidemia      PONV (postoperative nausea and vomiting)      Unspecified cerebral artery occlusion with cerebral infarction        Past Surgical History:   Procedure Laterality Date     COLONOSCOPY  08/13/07     HC COLONOSCOPY W BIOPSY  07/01/07     HC DILATION/CURETTAGE DIAG/THER NON OB  1984    D & C     LOBECTOMY LUNG Right 6/5/2015    Procedure: LOBECTOMY LUNG;  Surgeon: Yariel Lund MD;  Location: [facility]     PHACOEMULSIFICATION WITH STANDARD INTRAOCULAR LENS IMPLANT Right 5/3/2018    Procedure: PHACOEMULSIFICATION WITH STANDARD INTRAOCULAR LENS IMPLANT;  PHACOEMULSIFICATION WITH STANDARD INTRAOCULAR LENS IMPLANT RIGHT;  Surgeon: Chevy Neves MD;   Location: [facility] OR     PHACOEMULSIFICATION WITH STANDARD INTRAOCULAR LENS IMPLANT Left 2018    Procedure: PHACOEMULSIFICATION WITH STANDARD INTRAOCULAR LENS IMPLANT;  PHACOEMULSIFICATION WITH STANDARD INTRAOCULAR LENS IMPLANT LEFT EYE;  Surgeon: Chevy Neves MD;  Location: [facility] OR     SURGICAL HISTORY OF -       Left leg fx--had pinning     THORACOTOMY Right 2015    Procedure: THORACOTOMY;  Surgeon: Yariel Lund MD;  Location: [facility] OR        Family History   Problem Relation Age of Onset     Hypertension Father      Heart Disease Father         Fatal MI at 64     Diabetes Mother         Fatal complication of DM at 70     Hypertension Mother      Cancer Maternal Aunt         Stomach cancer x3     Gastrointestinal Disease Maternal Uncle         Bleeding ulcer - fatal     Diabetes Paternal Grandfather      Heart Disease Paternal Uncle         MI     Heart Disease Maternal Uncle         MI - fatal     Cancer Paternal Aunt         Breast     Circulatory Paternal Aunt         Aortic aneurysm     Breast Cancer Daughter 54       Social History     Socioeconomic History     Marital status:      Spouse name: Not on file     Number of children: Not on file     Years of education: Not on file     Highest education level: Not on file   Occupational History     Not on file   Social Needs     Financial resource strain: Not on file     Food insecurity     Worry: Not on file     Inability: Not on file     Transportation needs     Medical: Not on file     Non-medical: Not on file   Tobacco Use     Smoking status: Former Smoker     Packs/day: 0.00     Quit date: 3/16/1985     Years since quittin.5     Smokeless tobacco: Never Used   Substance and Sexual Activity     Alcohol use: No     Alcohol/week: 0.0 standard drinks     Drug use: No     Sexual activity: Never   Lifestyle     Physical activity     Days per week: Not on file     Minutes per session: Not on file     Stress: Not on file    Relationships     Social connections     Talks on phone: Not on file     Gets together: Not on file     Attends Jain service: Not on file     Active member of club or organization: Not on file     Attends meetings of clubs or organizations: Not on file     Relationship status: Not on file     Intimate partner violence     Fear of current or ex partner: Not on file     Emotionally abused: Not on file     Physically abused: Not on file     Forced sexual activity: Not on file   Other Topics Concern     Parent/sibling w/ CABG, MI or angioplasty before 65F 55M? Not Asked   Social History Narrative     Not on file       Outpatient Encounter Medications as of 10/5/2020   Medication Sig Dispense Refill     amLODIPine (NORVASC) 5 MG tablet Take 1 tablet (5 mg) by mouth daily 90 tablet 3     aspirin 81 MG tablet Take 1 tablet (81 mg) by mouth daily       atenolol (TENORMIN) 100 MG tablet Take 1 tablet (100 mg) by mouth 2 times daily 180 tablet 3     losartan (COZAAR) 100 MG tablet Take 1 tablet (100 mg) by mouth daily 90 tablet 3     simvastatin (ZOCOR) 20 MG tablet TAKE ONE TABLET BY MOUTH AT BEDTIME 90 tablet 3     No facility-administered encounter medications on file as of 10/5/2020.              Review Of Systems  Skin: As above  Eyes: negative  Ears/Nose/Throat: negative  Respiratory: No shortness of breath, dyspnea on exertion, cough, or hemoptysis  Cardiovascular: negative  Gastrointestinal: negative  Genitourinary: negative  Musculoskeletal: negative  Neurologic: negative  Psychiatric: negative  Hematologic/Lymphatic/Immunologic: negative  Endocrine: negative      O:   NAD, WDWN, Alert & Oriented, Mood & Affect wnl, Vitals stable   Here today alone   BP (!) 198/96   Pulse 60   LMP  (LMP Unknown)   SpO2 98%    General appearance normal   Vitals stable   Alert, oriented and in no acute distress      Following lymph nodes palpated: Occipital, Cervical, Supraclavicular no lad   Vertex scalp 1.3cm pink pearly  papule    Mid forehead white scarred 1.1cm red pink pearly papule    R forehead 8mm scaly papule        Stuck on papules and brown macules on trunk and ext   Red papules on trunk  Flesh colored papules on trunk     The remainder of expanded problem focused exam was normal; the following areas were examined:  scalp/hair, conjunctiva/lids, face, neck, lips, chest, digits/nails, RUE, LUE.      Eyes: Conjunctivae/lids:Normal     ENT: Lips, buccal mucosa, tongue: normal    MSK:Normal    Cardiovascular: peripheral edema none    Pulm: Breathing Normal    Lymph Nodes: No Head and Neck Lymphadenopathy     Neuro/Psych: Orientation:Alert and Orientedx3 ; Mood/Affect:normal       MICRO:     Vertex scalp:Orthokeratosis of epidermis with a proliferation of nests of basaloid cells, with peripheral palisading and a haphazard arrangement in the center extending into the dermis, forming nodules.  The tumor cells have hyperchromatic nuclei. Poor cytoplasm and intercellular bridging.    Mid forehead:Orthokeratosis of epidermis with a proliferation of nests of basaloid cells, with peripheral palisading and a haphazard arrangement in the center extending into the dermis, forming nodules.  The tumor cells have hyperchromatic nuclei. Poor cytoplasm and intercellular bridging.    R forehead:There is hyperkeratosis and focal parakeratosis of the epidermis, overlying atypical keratinocytes,  by areas of orthokeratosis, there are scattered basal atypical keratinocytes: with varying degrees of overlying loss of maturation, hyperchromatism, pleomorphism, increased and abnormal mitoses, dyskeratosis.  The dermis shows a variable inflammatory infiltrate.   A/P:  1. Seborrheic keratosis, lentigo, angioma, dermal nevus  2. R/o basal cell carcinoma   TANGENTIAL BIOPSY IN HOUSE:  After consent, anesthesia with LEC and prep, tangential excision performed and dx above confirmed with frozen section histology.  No complications and routine wound  care.      I have personally reviewed all specimens and/or slides and used them with my medical judgement to determine or confirm the final diagnosis.     Patient told result   Vertex scalp basal cell carcinoma schedule excision  Mid forheead basal cell carcinoma schedule excision  r forehead actinic keratosis cryo       BENIGN LESIONS DISCUSSED WITH PATIENT:  I discussed the specifics of tumor, prognosis, and genetics of benign lesions.  I explained that treatment of these lesions would be purely cosmetic and not medically neccessary.  I discussed with patient different removal options including excision, cautery and /or laser.      Nature and genetics of benign skin lesions dicussed with patient.  Signs and Symptoms of skin cancer discussed with patient.  ABCDEs of melanoma reviewed with patient.  Patient encouraged to perform monthly skin exams.  UV precautions reviewed with patient.  Skin care regimen reviewed with patient: Eliminate harsh soaps, i.e. Dial, zest, irsih spring; Mild soaps such as Cetaphil or Dove sensitive skin, avoid hot or cold showers, aggressive use of emollients including vanicream, cetaphil or cerave discussed with patient.    Risks of non-melanoma skin cancer discussed with patient   Return to clinic next apopt        Again, thank you for allowing me to participate in the care of your patient.        Sincerely,        Diego Coon MD

## 2020-10-05 NOTE — TELEPHONE ENCOUNTER
----- Message from Diego Coon MD sent at 10/5/2020  1:49 PM CDT -----  Vertex scalp basal cell carcinoma schedule excision  Mid forheead basal cell carcinoma schedule excision  r forehead actinic keratosis cryo

## 2020-10-06 NOTE — TELEPHONE ENCOUNTER
Patient notified. Patient verbalized understanding. Scheduled for MOHS Surgery x 2. Mohs brochure/Pre-op letter sent.   Erma Shankar RN

## 2020-10-09 DIAGNOSIS — E78.5 HYPERLIPIDEMIA LDL GOAL <130: Primary | ICD-10-CM

## 2020-10-09 RX ORDER — SIMVASTATIN 20 MG
TABLET ORAL
Qty: 90 TABLET | Refills: 3 | Status: SHIPPED | OUTPATIENT
Start: 2020-10-09 | End: 2021-10-07

## 2020-10-09 NOTE — TELEPHONE ENCOUNTER
Pending Prescriptions:                       Disp   Refills    simvastatin (ZOCOR) 20 MG tablet [Pharmacy*90 tab*3        Sig: TAKE ONE TABLET BY MOUTH AT BEDTIME    Last Written Prescription Date:  10/9/19  Last Fill Quantity: 90,  # refills: 3   Last office visit: 9/29/2020 with prescribing provider:  Yanna   Future Office Visit:      Routing refill request to provider for review/approval because:  Labs not current:  ldl    Rosalva Patel RN on 10/9/2020 at 10:08 AM

## 2020-10-12 NOTE — PROGRESS NOTES
Surgical Office Location :   [facility]  [address] [place] [address]

## 2020-10-13 ENCOUNTER — OFFICE VISIT (OUTPATIENT)
Dept: DERMATOLOGY | Facility: CLINIC | Age: 80
End: 2020-10-13
Payer: COMMERCIAL

## 2020-10-13 VITALS — HEART RATE: 53 BPM | SYSTOLIC BLOOD PRESSURE: 181 MMHG | DIASTOLIC BLOOD PRESSURE: 97 MMHG | OXYGEN SATURATION: 98 %

## 2020-10-13 DIAGNOSIS — L57.0 AK (ACTINIC KERATOSIS): Primary | ICD-10-CM

## 2020-10-13 DIAGNOSIS — C44.41 BASAL CELL CARCINOMA OF SCALP: ICD-10-CM

## 2020-10-13 PROCEDURE — 17311 MOHS 1 STAGE H/N/HF/G: CPT | Performed by: DERMATOLOGY

## 2020-10-13 PROCEDURE — 17312 MOHS ADDL STAGE: CPT | Performed by: DERMATOLOGY

## 2020-10-13 PROCEDURE — 13121 CMPLX RPR S/A/L 2.6-7.5 CM: CPT | Mod: 59 | Performed by: DERMATOLOGY

## 2020-10-13 PROCEDURE — 17000 DESTRUCT PREMALG LESION: CPT | Mod: 51 | Performed by: DERMATOLOGY

## 2020-10-13 NOTE — PATIENT INSTRUCTIONS
Stapled wound care     SCALP        ? No strenuous activity for 48 hours    ? Take Tylenol as needed for discomfort.                                                .         ? Do not drink alcoholic beverages for 48 hours.    ? Keep the pressure bandage in place for 24 hours. If the bandage becomes blood tinged or loose, reinforce it with gauze and tape.        (Refer to the reverse side of this page for management of bleeding).    ? Remove bandage in 24 hours and begin wound care as follows:     1. Clean area with tap water using a Q tip or gauze pad, (shower / bathe normally)  2. Dry wound with Q tip or gauze pad  3. Apply Aquaphor, Vaseline, Polysporin or Bacitracin Ointment with a Q tip    Do NOT use Neosporin Ointment *  4. Cover the wound with a band-aid or nonstick gauze pad and paper tape.  5. Repeat wound care once a day until wound is completely healed.    It is an old wives tale that a wound heals better when it is exposed to air and allowed to dry out. The wound will heal faster with a better cosmetic result if it is kept moist with ointment and covered with a bandage.  Do not let the wound dry out.      Supplies Needed:                Qtips or gauze pads                Polysporin or Bacitracin Ointment                Bandaids or nonstick gauze pads and paper tape    Wound care kits and brown paper tape are available for purchase at   the pharmacy.    BLEEDIN. Use tightly rolled up gauze or cloth to apply direct pressure over the bandage for 20   minutes.  2. Reapply pressure for an additional 20 minutes if necessary  3. Call the office or go to the nearest emergency room if pressure fails to stop the bleeding.  4. Use additional gauze and tape to maintain pressure once the bleeding has stopped.  5. Begin wound care 24 hours after surgery as directed.                  WOUND HEALING    1. One week after surgery a pink / red halo will form around the outside of the wound.   This is new  skin.  2. The center of the wound will appear yellowish white and produce some drainage.  3. The pink halo will slowly migrate in toward the center of the wound until the wound is covered with new shiny pink skin.  4. There will be no more drainage when the wound is completely healed.  5. It will take six months to one year for the redness to fade.  6. The scar may be itchy, tight and sensitive to extreme temperatures for a year after the surgery.  7. Massaging the area several times a day for several minutes after the wound is completely healed will help the scar soften and normalize faster. Begin massage only after healing is complete.      In case of emergency call: Dr Coon: [phone]     [facility]: [phone]    [facility]: [phone]

## 2020-10-13 NOTE — PROGRESS NOTES
Keyla Mari is a 80 year old year old female patient here today for evaluation and managment of basal cell carcinoma and actinic keratosis.  Patient has no other skin complaints today.  Remainder of the HPI, Meds, PMH, Allergies, FH, and SH was reviewed in chart.      Past Medical History:   Diagnosis Date     Acute, but ill-defined, cerebrovascular disease      Closed fracture of unspecified part of neck of femur     Hip fracture     CVA (cerebral infarction) 2005     Elevated blood pressure reading without diagnosis of hypertension      Hemorrhage of gastrointestinal tract, unspecified 08/13/2007    Transfer to [facility]     History of blood transfusion     after colonoscopy     HTN (hypertension)      Hyperlipidaemia      Other and unspecified hyperlipidemia      PONV (postoperative nausea and vomiting)      Unspecified cerebral artery occlusion with cerebral infarction        Past Surgical History:   Procedure Laterality Date     COLONOSCOPY  08/13/07     HC COLONOSCOPY W BIOPSY  07/01/07      DILATION/CURETTAGE DIAG/THER NON OB  1984    D & C     LOBECTOMY LUNG Right 6/5/2015    Procedure: LOBECTOMY LUNG;  Surgeon: Yariel Lund MD;  Location: [facility] OR     PHACOEMULSIFICATION WITH STANDARD INTRAOCULAR LENS IMPLANT Right 5/3/2018    Procedure: PHACOEMULSIFICATION WITH STANDARD INTRAOCULAR LENS IMPLANT;  PHACOEMULSIFICATION WITH STANDARD INTRAOCULAR LENS IMPLANT RIGHT;  Surgeon: Chevy Neves MD;  Location: [facility] OR     PHACOEMULSIFICATION WITH STANDARD INTRAOCULAR LENS IMPLANT Left 5/17/2018    Procedure: PHACOEMULSIFICATION WITH STANDARD INTRAOCULAR LENS IMPLANT;  PHACOEMULSIFICATION WITH STANDARD INTRAOCULAR LENS IMPLANT LEFT EYE;  Surgeon: Chevy Neves MD;  Location: [facility] OR     SURGICAL HISTORY OF -       Left leg fx--had pinning     THORACOTOMY Right 6/5/2015    Procedure: THORACOTOMY;  Surgeon: Yariel Lund MD;  Location: [facility] OR        Family History   Problem  Relation Age of Onset     Hypertension Father      Heart Disease Father         Fatal MI at 64     Diabetes Mother         Fatal complication of DM at 70     Hypertension Mother      Cancer Maternal Aunt         Stomach cancer x3     Gastrointestinal Disease Maternal Uncle         Bleeding ulcer - fatal     Diabetes Paternal Grandfather      Heart Disease Paternal Uncle         MI     Heart Disease Maternal Uncle         MI - fatal     Cancer Paternal Aunt         Breast     Circulatory Paternal Aunt         Aortic aneurysm     Breast Cancer Daughter 54       Social History     Socioeconomic History     Marital status:      Spouse name: Not on file     Number of children: Not on file     Years of education: Not on file     Highest education level: Not on file   Occupational History     Not on file   Social Needs     Financial resource strain: Not on file     Food insecurity     Worry: Not on file     Inability: Not on file     Transportation needs     Medical: Not on file     Non-medical: Not on file   Tobacco Use     Smoking status: Former Smoker     Packs/day: 0.00     Quit date: 3/16/1985     Years since quittin.6     Smokeless tobacco: Never Used   Substance and Sexual Activity     Alcohol use: No     Alcohol/week: 0.0 standard drinks     Drug use: No     Sexual activity: Never   Lifestyle     Physical activity     Days per week: Not on file     Minutes per session: Not on file     Stress: Not on file   Relationships     Social connections     Talks on phone: Not on file     Gets together: Not on file     Attends Mu-ism service: Not on file     Active member of club or organization: Not on file     Attends meetings of clubs or organizations: Not on file     Relationship status: Not on file     Intimate partner violence     Fear of current or ex partner: Not on file     Emotionally abused: Not on file     Physically abused: Not on file     Forced sexual activity: Not on file   Other Topics Concern      Parent/sibling w/ CABG, MI or angioplasty before 65F 55M? Not Asked   Social History Narrative     Not on file       Outpatient Encounter Medications as of 10/13/2020   Medication Sig Dispense Refill     amLODIPine (NORVASC) 5 MG tablet Take 1 tablet (5 mg) by mouth daily 90 tablet 3     aspirin 81 MG tablet Take 1 tablet (81 mg) by mouth daily       atenolol (TENORMIN) 100 MG tablet Take 1 tablet (100 mg) by mouth 2 times daily 180 tablet 3     losartan (COZAAR) 100 MG tablet Take 1 tablet (100 mg) by mouth daily 90 tablet 3     simvastatin (ZOCOR) 20 MG tablet TAKE ONE TABLET BY MOUTH AT BEDTIME 90 tablet 3     No facility-administered encounter medications on file as of 10/13/2020.              Review Of Systems  Skin: As above  Eyes: negative  Ears/Nose/Throat: negative  Respiratory: No shortness of breath, dyspnea on exertion, cough, or hemoptysis  Cardiovascular: negative  Gastrointestinal: negative  Genitourinary: negative  Musculoskeletal: negative  Neurologic: negative  Psychiatric: negative  Hematologic/Lymphatic/Immunologic: negative  Endocrine: negative      O:   NAD, WDWN, Alert & Oriented, Mood & Affect wnl, Vitals stable   Here today alone   BP (!) 181/97   Pulse 53   LMP  (LMP Unknown)   SpO2 98%    General appearance normal   Vitals stable   Alert, oriented and in no acute distress     R forehead scaly papule    Vertyex scalp 1.3cm pink pearly papule       Eyes: Conjunctivae/lids:Normal     ENT: Lips, buccal mucosa, tongue: normal    MSK:Normal    Cardiovascular: peripheral edema none    Pulm: Breathing Normal    Neuro/Psych: Orientation:Alert and Orientedx3 ; Mood/Affect:normal       A/P:  1. Actinic keratosis   LN2:  Treated with LN2 for 5s for 1-2 cycles. Warned risks of blistering, pain, pigment change, scarring, and incomplete resolution.  Advised patient to return if lesions do not completely resolve.  Wound care sheet given.    2. Scalp basal cell carcinoma   MOHS:   Location    The  rationale for Mohs surgery was discussed with the patient and consent was obtained.  The risks and benefits as well as alternatives to therapy were discussed, in detail.  Specifically, the risks of infection, scarring, bleeding, prolonged wound healing, incomplete removal, allergy to anesthesia, nerve injury and recurrence were addressed.  Indication for Mohs was Location. Prior to the procedure, the treatment site was clearly identified and, if available, confirmed with previous photos and confirmed by the patient   All components of the Universal Protocol/PAUSE rule were completed.  The Mohs surgeon operated in two distinct and integrated capacities as the surgeon and pathologist.      The area was prepped with Betasept.  A rim of normal appearing skin was marked circumferentially around the lesion.  The area was infiltrated with local anesthesia.  The tumor was first debulked to remove all clinically apparent tumor.  An incision following the standard Mohs approach was done and the specimen was oriented,mapped and placed in 2 block(s).  Each specimen was then chromacoded and processed in the Mohs laboratory using standard Mohs technique and submitted for frozen section histology.  Frozen section analysis showed  residual tumor but CLEAR MARGINS.      The tumor was excised using standard Mohs technique in 2 stages(s).  CLEAR MARGINS OBTAINED and Final defect size was 2 x 1.7 cm.     We discussed the options for wound management in full with the patient including risks/benefits/ possible outcomes.        REPAIR COMPLEX: Because of the tightness of the surrounding skin and Because of the size and full thickness nature of the defect, a complex closure was planned. After LEC anesthesia and prep, Burow's triangles were excised in the relaxed skin tension lines. The wound edges were widely undermined by dissection in the subcutaneous plane until adequate tissue mobility was obtained. Hemostasis was obtained. The wound  edges were closed in a layered fashion using Vicryl and Fast Absorbing Plain Gut sutures. Postoperative length was 4.4 cm.   EBL minimal; complications none; wound care routine.  The patient was discharged in good condition and will return in one week for wound evaluation.  BENIGN LESIONS DISCUSSED WITH PATIENT:  I discussed the specifics of tumor, prognosis, and genetics of benign lesions.  I explained that treatment of these lesions would be purely cosmetic and not medically neccessary.  I discussed with patient different removal options including excision, cautery and /or laser.      Nature and genetics of benign skin lesions dicussed with patient.  Signs and Symptoms of skin cancer discussed with patient.  Patient encouraged to perform monthly skin exams.  UV precautions reviewed with patient.  Skin care regimen reviewed with patient: Eliminate harsh soaps, i.e. Dial, zest, irsih spring; Mild soaps such as Cetaphil or Dove sensitive skin, avoid hot or cold showers, aggressive use of emollients including vanicream, cetaphil or cerave discussed with patient.    Risks of non-melanoma skin cancer discussed with patient   Return to clinic 6 months

## 2020-10-13 NOTE — LETTER
10/13/2020         RE: Keyla Mari  [address] [facility] [address]        Dear Colleague,    Thank you for referring your patient, Keyla Mari, to the [facility]. Please see a copy of my visit note below.    Surgical Office Location :   [facility]  [address] [place] [address]      Keyla Mari is a 80 year old year old female patient here today for evaluation and managment of basal cell carcinoma and actinic keratosis.  Patient has no other skin complaints today.  Remainder of the HPI, Meds, PMH, Allergies, FH, and SH was reviewed in chart.      Past Medical History:   Diagnosis Date     Acute, but ill-defined, cerebrovascular disease      Closed fracture of unspecified part of neck of femur     Hip fracture     CVA (cerebral infarction) 2005     Elevated blood pressure reading without diagnosis of hypertension      Hemorrhage of gastrointestinal tract, unspecified 08/13/2007    Transfer to [facility]     History of blood transfusion     after colonoscopy     HTN (hypertension)      Hyperlipidaemia      Other and unspecified hyperlipidemia      PONV (postoperative nausea and vomiting)      Unspecified cerebral artery occlusion with cerebral infarction        Past Surgical History:   Procedure Laterality Date     COLONOSCOPY  08/13/07     HC COLONOSCOPY W BIOPSY  07/01/07     HC DILATION/CURETTAGE DIAG/THER NON OB  1984    D & C     LOBECTOMY LUNG Right 6/5/2015    Procedure: LOBECTOMY LUNG;  Surgeon: Yariel Lund MD;  Location: [facility] OR     PHACOEMULSIFICATION WITH STANDARD INTRAOCULAR LENS IMPLANT Right 5/3/2018    Procedure: PHACOEMULSIFICATION WITH STANDARD INTRAOCULAR LENS IMPLANT;  PHACOEMULSIFICATION WITH STANDARD INTRAOCULAR LENS IMPLANT RIGHT;  Surgeon: Chevy Neves MD;  Location: [facility] OR     PHACOEMULSIFICATION WITH STANDARD INTRAOCULAR LENS IMPLANT Left 5/17/2018    Procedure: PHACOEMULSIFICATION WITH  STANDARD INTRAOCULAR LENS IMPLANT;  PHACOEMULSIFICATION WITH STANDARD INTRAOCULAR LENS IMPLANT LEFT EYE;  Surgeon: Chevy Neves MD;  Location: [facility] OR     SURGICAL HISTORY OF -       Left leg fx--had pinning     THORACOTOMY Right 2015    Procedure: THORACOTOMY;  Surgeon: Yariel Lund MD;  Location: [facility] OR        Family History   Problem Relation Age of Onset     Hypertension Father      Heart Disease Father         Fatal MI at 64     Diabetes Mother         Fatal complication of DM at 70     Hypertension Mother      Cancer Maternal Aunt         Stomach cancer x3     Gastrointestinal Disease Maternal Uncle         Bleeding ulcer - fatal     Diabetes Paternal Grandfather      Heart Disease Paternal Uncle         MI     Heart Disease Maternal Uncle         MI - fatal     Cancer Paternal Aunt         Breast     Circulatory Paternal Aunt         Aortic aneurysm     Breast Cancer Daughter 54       Social History     Socioeconomic History     Marital status:      Spouse name: Not on file     Number of children: Not on file     Years of education: Not on file     Highest education level: Not on file   Occupational History     Not on file   Social Needs     Financial resource strain: Not on file     Food insecurity     Worry: Not on file     Inability: Not on file     Transportation needs     Medical: Not on file     Non-medical: Not on file   Tobacco Use     Smoking status: Former Smoker     Packs/day: 0.00     Quit date: 3/16/1985     Years since quittin.6     Smokeless tobacco: Never Used   Substance and Sexual Activity     Alcohol use: No     Alcohol/week: 0.0 standard drinks     Drug use: No     Sexual activity: Never   Lifestyle     Physical activity     Days per week: Not on file     Minutes per session: Not on file     Stress: Not on file   Relationships     Social connections     Talks on phone: Not on file     Gets together: Not on file     Attends Baptism service: Not on file      Active member of club or organization: Not on file     Attends meetings of clubs or organizations: Not on file     Relationship status: Not on file     Intimate partner violence     Fear of current or ex partner: Not on file     Emotionally abused: Not on file     Physically abused: Not on file     Forced sexual activity: Not on file   Other Topics Concern     Parent/sibling w/ CABG, MI or angioplasty before 65F 55M? Not Asked   Social History Narrative     Not on file       Outpatient Encounter Medications as of 10/13/2020   Medication Sig Dispense Refill     amLODIPine (NORVASC) 5 MG tablet Take 1 tablet (5 mg) by mouth daily 90 tablet 3     aspirin 81 MG tablet Take 1 tablet (81 mg) by mouth daily       atenolol (TENORMIN) 100 MG tablet Take 1 tablet (100 mg) by mouth 2 times daily 180 tablet 3     losartan (COZAAR) 100 MG tablet Take 1 tablet (100 mg) by mouth daily 90 tablet 3     simvastatin (ZOCOR) 20 MG tablet TAKE ONE TABLET BY MOUTH AT BEDTIME 90 tablet 3     No facility-administered encounter medications on file as of 10/13/2020.              Review Of Systems  Skin: As above  Eyes: negative  Ears/Nose/Throat: negative  Respiratory: No shortness of breath, dyspnea on exertion, cough, or hemoptysis  Cardiovascular: negative  Gastrointestinal: negative  Genitourinary: negative  Musculoskeletal: negative  Neurologic: negative  Psychiatric: negative  Hematologic/Lymphatic/Immunologic: negative  Endocrine: negative      O:   NAD, WDWN, Alert & Oriented, Mood & Affect wnl, Vitals stable   Here today alone   BP (!) 181/97   Pulse 53   LMP  (LMP Unknown)   SpO2 98%    General appearance normal   Vitals stable   Alert, oriented and in no acute distress     R forehead scaly papule    Vertyex scalp 1.3cm pink pearly papule       Eyes: Conjunctivae/lids:Normal     ENT: Lips, buccal mucosa, tongue: normal    MSK:Normal    Cardiovascular: peripheral edema none    Pulm: Breathing Normal    Neuro/Psych:  Orientation:Alert and Orientedx3 ; Mood/Affect:normal       A/P:  1. Actinic keratosis   LN2:  Treated with LN2 for 5s for 1-2 cycles. Warned risks of blistering, pain, pigment change, scarring, and incomplete resolution.  Advised patient to return if lesions do not completely resolve.  Wound care sheet given.    2. Scalp basal cell carcinoma   MOHS:   Location    The rationale for Mohs surgery was discussed with the patient and consent was obtained.  The risks and benefits as well as alternatives to therapy were discussed, in detail.  Specifically, the risks of infection, scarring, bleeding, prolonged wound healing, incomplete removal, allergy to anesthesia, nerve injury and recurrence were addressed.  Indication for Mohs was Location. Prior to the procedure, the treatment site was clearly identified and, if available, confirmed with previous photos and confirmed by the patient   All components of the Universal Protocol/PAUSE rule were completed.  The Mohs surgeon operated in two distinct and integrated capacities as the surgeon and pathologist.      The area was prepped with Betasept.  A rim of normal appearing skin was marked circumferentially around the lesion.  The area was infiltrated with local anesthesia.  The tumor was first debulked to remove all clinically apparent tumor.  An incision following the standard Mohs approach was done and the specimen was oriented,mapped and placed in 2 block(s).  Each specimen was then chromacoded and processed in the Mohs laboratory using standard Mohs technique and submitted for frozen section histology.  Frozen section analysis showed  residual tumor but CLEAR MARGINS.      The tumor was excised using standard Mohs technique in 2 stages(s).  CLEAR MARGINS OBTAINED and Final defect size was 2 x 1.7 cm.     We discussed the options for wound management in full with the patient including risks/benefits/ possible outcomes.        REPAIR COMPLEX: Because of the tightness of the  surrounding skin and Because of the size and full thickness nature of the defect, a complex closure was planned. After LEC anesthesia and prep, Burow's triangles were excised in the relaxed skin tension lines. The wound edges were widely undermined by dissection in the subcutaneous plane until adequate tissue mobility was obtained. Hemostasis was obtained. The wound edges were closed in a layered fashion using Vicryl and Fast Absorbing Plain Gut sutures. Postoperative length was 4.4 cm.   EBL minimal; complications none; wound care routine.  The patient was discharged in good condition and will return in one week for wound evaluation.  BENIGN LESIONS DISCUSSED WITH PATIENT:  I discussed the specifics of tumor, prognosis, and genetics of benign lesions.  I explained that treatment of these lesions would be purely cosmetic and not medically neccessary.  I discussed with patient different removal options including excision, cautery and /or laser.      Nature and genetics of benign skin lesions dicussed with patient.  Signs and Symptoms of skin cancer discussed with patient.  Patient encouraged to perform monthly skin exams.  UV precautions reviewed with patient.  Skin care regimen reviewed with patient: Eliminate harsh soaps, i.e. Dial, zest, irsih spring; Mild soaps such as Cetaphil or Dove sensitive skin, avoid hot or cold showers, aggressive use of emollients including vanicream, cetaphil or cerave discussed with patient.    Risks of non-melanoma skin cancer discussed with patient   Return to clinic 6 months        Again, thank you for allowing me to participate in the care of your patient.        Sincerely,        Diego Coon MD

## 2020-10-20 ENCOUNTER — OFFICE VISIT (OUTPATIENT)
Dept: DERMATOLOGY | Facility: CLINIC | Age: 80
End: 2020-10-20
Payer: COMMERCIAL

## 2020-10-20 VITALS — HEART RATE: 55 BPM | OXYGEN SATURATION: 95 % | SYSTOLIC BLOOD PRESSURE: 157 MMHG | DIASTOLIC BLOOD PRESSURE: 84 MMHG

## 2020-10-20 DIAGNOSIS — C44.319 BASAL CELL CARCINOMA (BCC) OF FOREHEAD: Primary | ICD-10-CM

## 2020-10-20 PROCEDURE — 17311 MOHS 1 STAGE H/N/HF/G: CPT | Mod: 79 | Performed by: DERMATOLOGY

## 2020-10-20 PROCEDURE — 99207 PR NO CHARGE LOS: CPT | Performed by: DERMATOLOGY

## 2020-10-20 NOTE — PROGRESS NOTES
Keyla Mari is a 80 year old year old female patient here today for emo f basal cell carcinoma on mid forehead. Patient has no other skin complaints today.  Remainder of the HPI, Meds, PMH, Allergies, FH, and SH was reviewed in chart.      Past Medical History:   Diagnosis Date     Acute, but ill-defined, cerebrovascular disease      Basal cell carcinoma      Closed fracture of unspecified part of neck of femur     Hip fracture     CVA (cerebral infarction) 2005     Elevated blood pressure reading without diagnosis of hypertension      Hemorrhage of gastrointestinal tract, unspecified 08/13/2007    Transfer to [facility]     History of blood transfusion     after colonoscopy     HTN (hypertension)      Hyperlipidaemia      Other and unspecified hyperlipidemia      PONV (postoperative nausea and vomiting)      Unspecified cerebral artery occlusion with cerebral infarction        Past Surgical History:   Procedure Laterality Date     COLONOSCOPY  08/13/07     HC COLONOSCOPY W BIOPSY  07/01/07      DILATION/CURETTAGE DIAG/THER NON OB  1984    D & C     LOBECTOMY LUNG Right 6/5/2015    Procedure: LOBECTOMY LUNG;  Surgeon: Yariel Lund MD;  Location: [facility]     PHACOEMULSIFICATION WITH STANDARD INTRAOCULAR LENS IMPLANT Right 5/3/2018    Procedure: PHACOEMULSIFICATION WITH STANDARD INTRAOCULAR LENS IMPLANT;  PHACOEMULSIFICATION WITH STANDARD INTRAOCULAR LENS IMPLANT RIGHT;  Surgeon: Chevy Neves MD;  Location: [facility] OR     PHACOEMULSIFICATION WITH STANDARD INTRAOCULAR LENS IMPLANT Left 5/17/2018    Procedure: PHACOEMULSIFICATION WITH STANDARD INTRAOCULAR LENS IMPLANT;  PHACOEMULSIFICATION WITH STANDARD INTRAOCULAR LENS IMPLANT LEFT EYE;  Surgeon: Chevy Neves MD;  Location: [facility]     SURGICAL HISTORY OF -       Left leg fx--had pinning     THORACOTOMY Right 6/5/2015    Procedure: THORACOTOMY;  Surgeon: Yariel Lund MD;  Location: [facility] OR        Family History   Problem  Relation Age of Onset     Hypertension Father      Heart Disease Father         Fatal MI at 64     Diabetes Mother         Fatal complication of DM at 70     Hypertension Mother      Cancer Maternal Aunt         Stomach cancer x3     Gastrointestinal Disease Maternal Uncle         Bleeding ulcer - fatal     Diabetes Paternal Grandfather      Heart Disease Paternal Uncle         MI     Heart Disease Maternal Uncle         MI - fatal     Cancer Paternal Aunt         Breast     Circulatory Paternal Aunt         Aortic aneurysm     Breast Cancer Daughter 54       Social History     Socioeconomic History     Marital status:      Spouse name: Not on file     Number of children: Not on file     Years of education: Not on file     Highest education level: Not on file   Occupational History     Not on file   Social Needs     Financial resource strain: Not on file     Food insecurity     Worry: Not on file     Inability: Not on file     Transportation needs     Medical: Not on file     Non-medical: Not on file   Tobacco Use     Smoking status: Former Smoker     Packs/day: 0.00     Quit date: 3/16/1985     Years since quittin.6     Smokeless tobacco: Never Used   Substance and Sexual Activity     Alcohol use: No     Alcohol/week: 0.0 standard drinks     Drug use: No     Sexual activity: Never   Lifestyle     Physical activity     Days per week: Not on file     Minutes per session: Not on file     Stress: Not on file   Relationships     Social connections     Talks on phone: Not on file     Gets together: Not on file     Attends Jain service: Not on file     Active member of club or organization: Not on file     Attends meetings of clubs or organizations: Not on file     Relationship status: Not on file     Intimate partner violence     Fear of current or ex partner: Not on file     Emotionally abused: Not on file     Physically abused: Not on file     Forced sexual activity: Not on file   Other Topics Concern      Parent/sibling w/ CABG, MI or angioplasty before 65F 55M? Not Asked   Social History Narrative     Not on file       Outpatient Encounter Medications as of 10/20/2020   Medication Sig Dispense Refill     amLODIPine (NORVASC) 5 MG tablet Take 1 tablet (5 mg) by mouth daily 90 tablet 3     aspirin 81 MG tablet Take 1 tablet (81 mg) by mouth daily       atenolol (TENORMIN) 100 MG tablet Take 1 tablet (100 mg) by mouth 2 times daily 180 tablet 3     losartan (COZAAR) 100 MG tablet Take 1 tablet (100 mg) by mouth daily 90 tablet 3     simvastatin (ZOCOR) 20 MG tablet TAKE ONE TABLET BY MOUTH AT BEDTIME 90 tablet 3     No facility-administered encounter medications on file as of 10/20/2020.              Review Of Systems  Skin: As above  Eyes: negative  Ears/Nose/Throat: negative  Respiratory: No shortness of breath, dyspnea on exertion, cough, or hemoptysis  Cardiovascular: negative  Gastrointestinal: negative  Genitourinary: negative  Musculoskeletal: negative  Neurologic: negative  Psychiatric: negative  Hematologic/Lymphatic/Immunologic: negative  Endocrine: negative      O:   NAD, WDWN, Alert & Oriented, Mood & Affect wnl, Vitals stable   Here today alone   BP (!) 157/84   Pulse 55   LMP  (LMP Unknown)   SpO2 95%    General appearance normal   Vitals stable   Alert, oriented and in no acute distress     Mid forehead 1.1cm red scaly papule     Eyes: Conjunctivae/lids:Normal     ENT: Lips, buccal mucosa, tongue: normal    MSK:Normal    Cardiovascular: peripheral edema none    Pulm: Breathing Normal    Neuro/Psych: Orientation:Alert and Orientedx3 ; Mood/Affect:normal       A/P:  1. Mid forehead basal cell carcinoma   MOHS:   Location    The rationale for Mohs surgery was discussed with the patient and consent was obtained.  The risks and benefits as well as alternatives to therapy were discussed, in detail.  Specifically, the risks of infection, scarring, bleeding, prolonged wound healing, incomplete removal,  allergy to anesthesia, nerve injury and recurrence were addressed.  Indication for Mohs was Location. Prior to the procedure, the treatment site was clearly identified and, if available, confirmed with previous photos and confirmed by the patient   All components of the Universal Protocol/PAUSE rule were completed.  The Mohs surgeon operated in two distinct and integrated capacities as the surgeon and pathologist.      The area was prepped with Betasept.  A rim of normal appearing skin was marked circumferentially around the lesion.  The area was infiltrated with local anesthesia.  The tumor was first debulked to remove all clinically apparent tumor.  An incision following the standard Mohs approach was done and the specimen was oriented,mapped and placed in 1 block(s).  Each specimen was then chromacoded and processed in the Mohs laboratory using standard Mohs technique and submitted for frozen section histology.  Frozen section analysis showed no residual tumor but CLEAR MARGINS.      The tumor was excised using standard Mohs technique in 1 stages(s).  CLEAR MARGINS OBTAINED and Final defect size was 1.5 x 1.6 cm.     We discussed the options for wound management in full with the patient including risks/benefits/ possible outcomes.        REPAIR SECOND INTENT: We discussed the options for wound management in full with the patient including risks/benefits/possible outcomes. Decision made to allow the wound to heal by second intention. EBL minimal; complications none; wound care routine.  The patient was discharged in good condition and will return in one month or prn for wound evaluation.  BENIGN LESIONS DISCUSSED WITH PATIENT:  I discussed the specifics of tumor, prognosis, and genetics of benign lesions.  I explained that treatment of these lesions would be purely cosmetic and not medically neccessary.  I discussed with patient different removal options including excision, cautery and /or laser.      Nature and  genetics of benign skin lesions dicussed with patient.  Signs and Symptoms of skin cancer discussed with patient.  Patient encouraged to perform monthly skin exams.  UV precautions reviewed with patient.  Skin care regimen reviewed with patient: Eliminate harsh soaps, i.e. Dial, zest, irsih spring; Mild soaps such as Cetaphil or Dove sensitive skin, avoid hot or cold showers, aggressive use of emollients including vanicream, cetaphil or cerave discussed with patient.    Risks of non-melanoma skin cancer discussed with patient   Return to clinic 6 months

## 2020-10-20 NOTE — PATIENT INSTRUCTIONS
Open Wound Care         ? No strenuous activity for 48 hours    ? Take Tylenol as needed for discomfort.                                                .         ? Do not drink alcoholic beverages for 48 hours.    ? Keep the pressure bandage in place for 24 hours. If the bandage becomes blood tinged or loose, reinforce it with gauze and tape.        (Refer to the reverse side of this page for management of bleeding).    ? Remove bandage in 24 hours and begin wound care as follows:     1. Clean area with tap water using a Q tip or gauze pad, (shower / bathe normally)  2. Dry wound with Q tip or gauze pad  3. Apply Aquaphor, Vaseline, Polysporin or Bacitracin Ointment with a Q tip    Do NOT use Neosporin Ointment *  4. Cover the wound with a band-aid or nonstick gauze pad and paper tape.  5. Repeat wound care once a day until wound is completely healed.    It is an old wives tale that a wound heals better when it is exposed to air and allowed to dry out. The wound will heal faster with a better cosmetic result if it is kept moist with ointment and covered with a bandage.  Do not let the wound dry out.      Supplies Needed:                Qtips or gauze pads                Polysporin or Bacitracin Ointment                Bandaids or nonstick gauze pads and paper tape    Wound care kits and brown paper tape are available for purchase at   the pharmacy.    BLEEDIN. Use tightly rolled up gauze or cloth to apply direct pressure over the bandage for 20   minutes.  2. Reapply pressure for an additional 20 minutes if necessary  3. Call the office or go to the nearest emergency room if pressure fails to stop the bleeding.  4. Use additional gauze and tape to maintain pressure once the bleeding has stopped.  5. Begin wound care 24 hours after surgery as directed.                  WOUND HEALING    1. One week after surgery a pink / red halo will form around the outside of the wound.   This is new skin.  2. The center of  the wound will appear yellowish white and produce some drainage.  3. The pink halo will slowly migrate in toward the center of the wound until the wound is covered with new shiny pink skin.  4. There will be no more drainage when the wound is completely healed.  5. It will take six months to one year for the redness to fade.  6. The scar may be itchy, tight and sensitive to extreme temperatures for a year after the surgery.  7. Massaging the area several times a day for several minutes after the wound is completely healed will help the scar soften and normalize faster. Begin massage only after healing is complete.      In case of emergency call: Dr Coon: [phone]     [facility]: [phone]    [facility]: [phone]        ONE WEEK DRESSING CHANGE  SCALP  For  STAPLE REMOVAL     The following information has been compiled to offer assistance with the dressing change or wound evaluation. Please feel free to call our office to speak with one of the nurses if you have any questions or concerns about the progress of the wound healing process especially if there are any signs of flap necrosis or infection. We will be happy to answer any questions you might have.                                                 AFTER 24 HOURS YOU SHOULD REMOVE THE BANDAGE AND BEGIN DAILY DRESSING CHANGES AS FOLLOWS:     1) Remove Dressing.     2) Clean and dry the area with tap water using a Q-tip or sterile gauze pad.     3) Apply Vaseline, Polysporin ointment, Aquaphor or Bacitracin ointment over entire wound.  Do NOT use Neosporin ointment.     4) Cover the wound with a band-aid, or a sterile non-stick gauze pad and micropore paper tape      REPEAT THESE INSTRUCTIONS AT LEAST ONCE A DAY UNTIL THE WOUND HAS COMPLETELY HEALED. DO NOT LET THE WOUND SCAB OVER.    It is an old wives tale that a wound heals better when it is exposed to air and allowed to dry out. The wound will heal faster with a better cosmetic  result if it is kept moist with ointment and covered with a bandage.     Massaging the wound site hastens the healing process by softening the scar tissue and fading the scar. Begin massaging the area one month after surgery as often as possible. Continue to massage the area for 2-3 months or until they feel the scar tissue has softened. Moisturizers can be used during the massaging but are not necessary. Ultimately it takes six months for the scar to soften and blend into the surrounding skin.

## 2020-10-20 NOTE — LETTER
10/20/2020         RE: Keyla Mari  [address]  [facility] [address]        Dear Colleague,    Thank you for referring your patient, Keyla Mari, to the [facility]. Please see a copy of my visit note below.    Surgical Office Location :   [facility]  [address] [place] [address]      Keyla Mari is a 80 year old year old female patient here today for emo f basal cell carcinoma on mid forehead. Patient has no other skin complaints today.  Remainder of the HPI, Meds, PMH, Allergies, FH, and SH was reviewed in chart.      Past Medical History:   Diagnosis Date     Acute, but ill-defined, cerebrovascular disease      Basal cell carcinoma      Closed fracture of unspecified part of neck of femur     Hip fracture     CVA (cerebral infarction) 2005     Elevated blood pressure reading without diagnosis of hypertension      Hemorrhage of gastrointestinal tract, unspecified 08/13/2007    Transfer to [facility]     History of blood transfusion     after colonoscopy     HTN (hypertension)      Hyperlipidaemia      Other and unspecified hyperlipidemia      PONV (postoperative nausea and vomiting)      Unspecified cerebral artery occlusion with cerebral infarction        Past Surgical History:   Procedure Laterality Date     COLONOSCOPY  08/13/07     HC COLONOSCOPY W BIOPSY  07/01/07     HC DILATION/CURETTAGE DIAG/THER NON OB  1984    D & C     LOBECTOMY LUNG Right 6/5/2015    Procedure: LOBECTOMY LUNG;  Surgeon: Yariel Lund MD;  Location: [facility] OR     PHACOEMULSIFICATION WITH STANDARD INTRAOCULAR LENS IMPLANT Right 5/3/2018    Procedure: PHACOEMULSIFICATION WITH STANDARD INTRAOCULAR LENS IMPLANT;  PHACOEMULSIFICATION WITH STANDARD INTRAOCULAR LENS IMPLANT RIGHT;  Surgeon: Chevy Neves MD;  Location: [facility] OR     PHACOEMULSIFICATION WITH STANDARD INTRAOCULAR LENS IMPLANT Left 5/17/2018    Procedure: PHACOEMULSIFICATION WITH  STANDARD INTRAOCULAR LENS IMPLANT;  PHACOEMULSIFICATION WITH STANDARD INTRAOCULAR LENS IMPLANT LEFT EYE;  Surgeon: Chevy Neves MD;  Location: [facility] OR     SURGICAL HISTORY OF -       Left leg fx--had pinning     THORACOTOMY Right 2015    Procedure: THORACOTOMY;  Surgeon: Yariel Lund MD;  Location: [facility] OR        Family History   Problem Relation Age of Onset     Hypertension Father      Heart Disease Father         Fatal MI at 64     Diabetes Mother         Fatal complication of DM at 70     Hypertension Mother      Cancer Maternal Aunt         Stomach cancer x3     Gastrointestinal Disease Maternal Uncle         Bleeding ulcer - fatal     Diabetes Paternal Grandfather      Heart Disease Paternal Uncle         MI     Heart Disease Maternal Uncle         MI - fatal     Cancer Paternal Aunt         Breast     Circulatory Paternal Aunt         Aortic aneurysm     Breast Cancer Daughter 54       Social History     Socioeconomic History     Marital status:      Spouse name: Not on file     Number of children: Not on file     Years of education: Not on file     Highest education level: Not on file   Occupational History     Not on file   Social Needs     Financial resource strain: Not on file     Food insecurity     Worry: Not on file     Inability: Not on file     Transportation needs     Medical: Not on file     Non-medical: Not on file   Tobacco Use     Smoking status: Former Smoker     Packs/day: 0.00     Quit date: 3/16/1985     Years since quittin.6     Smokeless tobacco: Never Used   Substance and Sexual Activity     Alcohol use: No     Alcohol/week: 0.0 standard drinks     Drug use: No     Sexual activity: Never   Lifestyle     Physical activity     Days per week: Not on file     Minutes per session: Not on file     Stress: Not on file   Relationships     Social connections     Talks on phone: Not on file     Gets together: Not on file     Attends Jain service: Not on file      Active member of club or organization: Not on file     Attends meetings of clubs or organizations: Not on file     Relationship status: Not on file     Intimate partner violence     Fear of current or ex partner: Not on file     Emotionally abused: Not on file     Physically abused: Not on file     Forced sexual activity: Not on file   Other Topics Concern     Parent/sibling w/ CABG, MI or angioplasty before 65F 55M? Not Asked   Social History Narrative     Not on file       Outpatient Encounter Medications as of 10/20/2020   Medication Sig Dispense Refill     amLODIPine (NORVASC) 5 MG tablet Take 1 tablet (5 mg) by mouth daily 90 tablet 3     aspirin 81 MG tablet Take 1 tablet (81 mg) by mouth daily       atenolol (TENORMIN) 100 MG tablet Take 1 tablet (100 mg) by mouth 2 times daily 180 tablet 3     losartan (COZAAR) 100 MG tablet Take 1 tablet (100 mg) by mouth daily 90 tablet 3     simvastatin (ZOCOR) 20 MG tablet TAKE ONE TABLET BY MOUTH AT BEDTIME 90 tablet 3     No facility-administered encounter medications on file as of 10/20/2020.              Review Of Systems  Skin: As above  Eyes: negative  Ears/Nose/Throat: negative  Respiratory: No shortness of breath, dyspnea on exertion, cough, or hemoptysis  Cardiovascular: negative  Gastrointestinal: negative  Genitourinary: negative  Musculoskeletal: negative  Neurologic: negative  Psychiatric: negative  Hematologic/Lymphatic/Immunologic: negative  Endocrine: negative      O:   NAD, WDWN, Alert & Oriented, Mood & Affect wnl, Vitals stable   Here today alone   BP (!) 157/84   Pulse 55   LMP  (LMP Unknown)   SpO2 95%    General appearance normal   Vitals stable   Alert, oriented and in no acute distress     Mid forehead 1.1cm red scaly papule     Eyes: Conjunctivae/lids:Normal     ENT: Lips, buccal mucosa, tongue: normal    MSK:Normal    Cardiovascular: peripheral edema none    Pulm: Breathing Normal    Neuro/Psych: Orientation:Alert and Orientedx3 ;  Mood/Affect:normal       A/P:  1. Mid forehead basal cell carcinoma   MOHS:   Location    The rationale for Mohs surgery was discussed with the patient and consent was obtained.  The risks and benefits as well as alternatives to therapy were discussed, in detail.  Specifically, the risks of infection, scarring, bleeding, prolonged wound healing, incomplete removal, allergy to anesthesia, nerve injury and recurrence were addressed.  Indication for Mohs was Location. Prior to the procedure, the treatment site was clearly identified and, if available, confirmed with previous photos and confirmed by the patient   All components of the Universal Protocol/PAUSE rule were completed.  The Mohs surgeon operated in two distinct and integrated capacities as the surgeon and pathologist.      The area was prepped with Betasept.  A rim of normal appearing skin was marked circumferentially around the lesion.  The area was infiltrated with local anesthesia.  The tumor was first debulked to remove all clinically apparent tumor.  An incision following the standard Mohs approach was done and the specimen was oriented,mapped and placed in 1 block(s).  Each specimen was then chromacoded and processed in the Mohs laboratory using standard Mohs technique and submitted for frozen section histology.  Frozen section analysis showed no residual tumor but CLEAR MARGINS.      The tumor was excised using standard Mohs technique in 1 stages(s).  CLEAR MARGINS OBTAINED and Final defect size was 1.5 x 1.6 cm.     We discussed the options for wound management in full with the patient including risks/benefits/ possible outcomes.        REPAIR SECOND INTENT: We discussed the options for wound management in full with the patient including risks/benefits/possible outcomes. Decision made to allow the wound to heal by second intention. EBL minimal; complications none; wound care routine.  The patient was discharged in good condition and will return in one  month or prn for wound evaluation.  BENIGN LESIONS DISCUSSED WITH PATIENT:  I discussed the specifics of tumor, prognosis, and genetics of benign lesions.  I explained that treatment of these lesions would be purely cosmetic and not medically neccessary.  I discussed with patient different removal options including excision, cautery and /or laser.      Nature and genetics of benign skin lesions dicussed with patient.  Signs and Symptoms of skin cancer discussed with patient.  Patient encouraged to perform monthly skin exams.  UV precautions reviewed with patient.  Skin care regimen reviewed with patient: Eliminate harsh soaps, i.e. Dial, zest, irsih spring; Mild soaps such as Cetaphil or Dove sensitive skin, avoid hot or cold showers, aggressive use of emollients including vanicream, cetaphil or cerave discussed with patient.    Risks of non-melanoma skin cancer discussed with patient   Return to clinic 6 months        Again, thank you for allowing me to participate in the care of your patient.        Sincerely,        Diego Coon MD

## 2021-03-01 ENCOUNTER — NURSE TRIAGE (OUTPATIENT)
Dept: NURSING | Facility: CLINIC | Age: 81
End: 2021-03-01

## 2021-03-01 NOTE — TELEPHONE ENCOUNTER
Patient is calling in about her BP. She stated that it high again. She stated 179/85. Recheck was about the same. No diet changes taking her medication amlodipine 5mg, losartan 100 mg daily, atenolol 100 mg BID.   Normally runs 145-150/80 denies any chest pain, difficulty breathing, unsteady gait, blurred vision.  Per NR protocol patient should make an appointment to be seen within 2 weeks. Home care suggestions reviewed with patient per RN protocol and she was transferred to scheduling.   Rosa Rivas RN on 3/1/2021 at 11:15 AM      Additional Information    Negative: Sounds like a life-threatening emergency to the triager    Negative: Pregnant > 20 weeks or postpartum (< 6 weeks after delivery) and new hand or face swelling    Negative: Pregnant > 20 weeks and BP > 140/90    Negative: Systolic BP >= 160 OR Diastolic >= 100, and any cardiac or neurologic symptoms (e.g., chest pain, difficulty breathing, unsteady gait, blurred vision)    Negative: Patient sounds very sick or weak to the triager    Negative: BP Systolic BP >= 140 OR Diastolic >= 90 and postpartum (from 0 to 6 weeks after delivery)    Negative: Systolic BP >= 180 OR Diastolic >= 110, and missed most recent dose of blood pressure medication    Negative: Systolic BP >= 180 OR Diastolic >= 110    Negative: Patient wants to be seen    Negative: Ran out of BP medications    Negative: Taking BP medications and feels is having side effects (e.g., impotence, cough, dizziness)    Systolic BP >= 130 OR Diastolic >= 80, and is taking BP medications    Negative: Systolic BP >= 160 OR Diastolic >= 100    Negative: Systolic BP >= 130 OR Diastolic >= 80, and pregnant    Protocols used: HIGH BLOOD PRESSURE-A-OH

## 2021-03-05 ENCOUNTER — APPOINTMENT (OUTPATIENT)
Dept: CT IMAGING | Facility: CLINIC | Age: 81
End: 2021-03-05
Attending: FAMILY MEDICINE
Payer: COMMERCIAL

## 2021-03-05 ENCOUNTER — APPOINTMENT (OUTPATIENT)
Dept: GENERAL RADIOLOGY | Facility: CLINIC | Age: 81
End: 2021-03-05
Attending: FAMILY MEDICINE
Payer: COMMERCIAL

## 2021-03-05 ENCOUNTER — HOSPITAL ENCOUNTER (EMERGENCY)
Facility: CLINIC | Age: 81
Discharge: HOME | End: 2021-03-05
Attending: FAMILY MEDICINE | Admitting: FAMILY MEDICINE
Payer: COMMERCIAL

## 2021-03-05 ENCOUNTER — TELEPHONE (OUTPATIENT)
Dept: FAMILY MEDICINE | Facility: CLINIC | Age: 81
End: 2021-03-05

## 2021-03-05 VITALS
HEART RATE: 58 BPM | DIASTOLIC BLOOD PRESSURE: 96 MMHG | RESPIRATION RATE: 11 BRPM | TEMPERATURE: 98 F | OXYGEN SATURATION: 97 % | SYSTOLIC BLOOD PRESSURE: 173 MMHG

## 2021-03-05 DIAGNOSIS — R20.2 PARESTHESIAS: ICD-10-CM

## 2021-03-05 LAB
ALBUMIN SERPL-MCNC: 3.6 G/DL (ref 3.4–5)
ALP SERPL-CCNC: 96 U/L (ref 40–150)
ALT SERPL W P-5'-P-CCNC: 29 U/L (ref 0–50)
ANION GAP SERPL CALCULATED.3IONS-SCNC: 5 MMOL/L (ref 3–14)
AST SERPL W P-5'-P-CCNC: 18 U/L (ref 0–45)
BASOPHILS # BLD AUTO: 0 10E9/L (ref 0–0.2)
BASOPHILS NFR BLD AUTO: 0.3 %
BILIRUB SERPL-MCNC: 0.8 MG/DL (ref 0.2–1.3)
BUN SERPL-MCNC: 9 MG/DL (ref 7–30)
CALCIUM SERPL-MCNC: 9 MG/DL (ref 8.5–10.1)
CHLORIDE SERPL-SCNC: 102 MMOL/L (ref 94–109)
CO2 SERPL-SCNC: 28 MMOL/L (ref 20–32)
CREAT SERPL-MCNC: 0.78 MG/DL (ref 0.52–1.04)
DIFFERENTIAL METHOD BLD: NORMAL
EOSINOPHIL # BLD AUTO: 0 10E9/L (ref 0–0.7)
EOSINOPHIL NFR BLD AUTO: 0.5 %
ERYTHROCYTE [DISTWIDTH] IN BLOOD BY AUTOMATED COUNT: 13.2 % (ref 10–15)
GFR SERPL CREATININE-BSD FRML MDRD: 71 ML/MIN/{1.73_M2}
GLUCOSE SERPL-MCNC: 110 MG/DL (ref 70–99)
HCT VFR BLD AUTO: 44 % (ref 35–47)
HGB BLD-MCNC: 15.1 G/DL (ref 11.7–15.7)
IMM GRANULOCYTES # BLD: 0 10E9/L (ref 0–0.4)
IMM GRANULOCYTES NFR BLD: 0.2 %
LYMPHOCYTES # BLD AUTO: 1.2 10E9/L (ref 0.8–5.3)
LYMPHOCYTES NFR BLD AUTO: 20.3 %
MCH RBC QN AUTO: 30 PG (ref 26.5–33)
MCHC RBC AUTO-ENTMCNC: 34.3 G/DL (ref 31.5–36.5)
MCV RBC AUTO: 87 FL (ref 78–100)
MONOCYTES # BLD AUTO: 0.8 10E9/L (ref 0–1.3)
MONOCYTES NFR BLD AUTO: 13.7 %
NEUTROPHILS # BLD AUTO: 4 10E9/L (ref 1.6–8.3)
NEUTROPHILS NFR BLD AUTO: 65 %
NRBC # BLD AUTO: 0 10*3/UL
NRBC BLD AUTO-RTO: 0 /100
PLATELET # BLD AUTO: 174 10E9/L (ref 150–450)
POTASSIUM SERPL-SCNC: 3.6 MMOL/L (ref 3.4–5.3)
PROT SERPL-MCNC: 7.1 G/DL (ref 6.8–8.8)
RBC # BLD AUTO: 5.04 10E12/L (ref 3.8–5.2)
SODIUM SERPL-SCNC: 135 MMOL/L (ref 133–144)
WBC # BLD AUTO: 6.1 10E9/L (ref 4–11)

## 2021-03-05 PROCEDURE — 70450 CT HEAD/BRAIN W/O DYE: CPT

## 2021-03-05 PROCEDURE — 99285 EMERGENCY DEPT VISIT HI MDM: CPT | Mod: 25 | Performed by: FAMILY MEDICINE

## 2021-03-05 PROCEDURE — 80053 COMPREHEN METABOLIC PANEL: CPT | Performed by: FAMILY MEDICINE

## 2021-03-05 PROCEDURE — 93005 ELECTROCARDIOGRAM TRACING: CPT | Performed by: FAMILY MEDICINE

## 2021-03-05 PROCEDURE — 85025 COMPLETE CBC W/AUTO DIFF WBC: CPT | Performed by: FAMILY MEDICINE

## 2021-03-05 PROCEDURE — 71046 X-RAY EXAM CHEST 2 VIEWS: CPT

## 2021-03-05 PROCEDURE — 99285 EMERGENCY DEPT VISIT HI MDM: CPT | Performed by: FAMILY MEDICINE

## 2021-03-05 NOTE — ED TRIAGE NOTES
"Presents with a two week history of intermittent numbness to fingers and toes. \"It comes and goes.\"  "

## 2021-03-05 NOTE — TELEPHONE ENCOUNTER
Reason for call:  Symptom   Symptom or request: not feeling well    Duration (how long have symptoms been present): couple weeks  Have you been treated for this before? No    Additional comments: patients daughter called in requesting nurse call her mom Keyla as she has expressed to her she just doesn't feel right, no specific symptoms were relayed.    Phone number to reach patient:  Home number on file [phone] (home)    Best Time:  any    Can we leave a detailed message on this number?  YES    Travel screening: Not Applicable

## 2021-03-05 NOTE — TELEPHONE ENCOUNTER
"Spoke with patient this morning with complaints of just feeling \"blah\" the past couple weeks.  She reports that she feels tired and has a upset stomach, she is vague with symptoms and unable to explain further.  She reports a decrease in appetite.  She denies fevers, nausea, vomiting, diarrhea, constipation, shortness of breath, chest pain, urinary concerns.  She is not sleeping well, but reports that she never has.  Denies any recent exposures.      She is requesting to see Dr. Raines next week, she is wondering if she needs some labs completed     Barbi Madden RN  "

## 2021-03-05 NOTE — ED PROVIDER NOTES
History     Chief Complaint   Patient presents with     Numbness     HPI  Keyla Mari is a 80 year old female who presents with intermittent extremity numbness and lightheadedness has been going on for the past 2 weeks.  Patient states that the symptoms will just come and go, sometimes lasting a few minutes to hours.  Patient states she feels lightheaded like she is going to pass out, there is no sense of movement.  She will then sometimes get numbness in both of her hands and both of her feet.  Denies any weakness.  Denies any chest pain.  She states that she has not been sleeping well and has not had much of an appetite over the last couple weeks.  Patient is only in because her family made her come in to get things checked out, she has not called her primary care doctor.  Denies any nausea or vomiting or diarrhea.  Denies any rashes.  Denies any chest pain or back pain that is new.    Allergies:  Allergies   Allergen Reactions     Indomethacin GI Disturbance       Problem List:    Patient Active Problem List    Diagnosis Date Noted     Cystocele, midline 02/25/2019     Priority: Medium     Hypertension goal BP (blood pressure) < 150/90 08/07/2017     Priority: Medium     Cerebral infarction (H) 06/25/2013     Priority: Medium     Diagnosis updated by automated process. Provider to review and confirm.       Advanced directives, counseling/discussion 07/09/2012     Priority: Medium     Has a copy already here in clinic she states.  7/9/2012        CKD (chronic kidney disease) stage 3, GFR 30-59 ml/min 02/10/2011     Priority: Medium     Lumbar radiculopathy 02/09/2011     Priority: Medium     HYPERLIPIDEMIA LDL GOAL <100 10/31/2010     Priority: Medium     Gout 10/11/2010     Priority: Medium     Displacement of lumbar intervertebral disc without myelopathy 04/07/2008     Priority: Medium     Transient cerebral ischemia 01/24/2006     Priority: Medium     Problem list name updated by automated process.  Provider to review          Past Medical History:    Past Medical History:   Diagnosis Date     Acute, but ill-defined, cerebrovascular disease      Basal cell carcinoma      Closed fracture of unspecified part of neck of femur      CVA (cerebral infarction) 2005     Elevated blood pressure reading without diagnosis of hypertension      Hemorrhage of gastrointestinal tract, unspecified 08/13/2007     History of blood transfusion      HTN (hypertension)      Hyperlipidaemia      Other and unspecified hyperlipidemia      PONV (postoperative nausea and vomiting)      Unspecified cerebral artery occlusion with cerebral infarction        Past Surgical History:    Past Surgical History:   Procedure Laterality Date     COLONOSCOPY  08/13/07      COLONOSCOPY W BIOPSY  07/01/07      DILATION/CURETTAGE DIAG/THER NON OB  1984    D & C     LOBECTOMY LUNG Right 6/5/2015    Procedure: LOBECTOMY LUNG;  Surgeon: Yariel Lund MD;  Location: [facility] OR     PHACOEMULSIFICATION WITH STANDARD INTRAOCULAR LENS IMPLANT Right 5/3/2018    Procedure: PHACOEMULSIFICATION WITH STANDARD INTRAOCULAR LENS IMPLANT;  PHACOEMULSIFICATION WITH STANDARD INTRAOCULAR LENS IMPLANT RIGHT;  Surgeon: Chevy Neves MD;  Location: [facility] OR     PHACOEMULSIFICATION WITH STANDARD INTRAOCULAR LENS IMPLANT Left 5/17/2018    Procedure: PHACOEMULSIFICATION WITH STANDARD INTRAOCULAR LENS IMPLANT;  PHACOEMULSIFICATION WITH STANDARD INTRAOCULAR LENS IMPLANT LEFT EYE;  Surgeon: Chevy Neves MD;  Location: [facility] OR     SURGICAL HISTORY OF -       Left leg fx--had pinning     THORACOTOMY Right 6/5/2015    Procedure: THORACOTOMY;  Surgeon: Yariel Lund MD;  Location: [facility] OR       Family History:    Family History   Problem Relation Age of Onset     Hypertension Father      Heart Disease Father         Fatal MI at 64     Diabetes Mother         Fatal complication of DM at 70     Hypertension Mother      Cancer Maternal Aunt         Stomach  cancer x3     Gastrointestinal Disease Maternal Uncle         Bleeding ulcer - fatal     Diabetes Paternal Grandfather      Heart Disease Paternal Uncle         MI     Heart Disease Maternal Uncle         MI - fatal     Cancer Paternal Aunt         Breast     Circulatory Paternal Aunt         Aortic aneurysm     Breast Cancer Daughter 54       Social History:  Marital Status:   [5]  Social History     Tobacco Use     Smoking status: Former Smoker     Packs/day: 0.00     Quit date: 3/16/1985     Years since quittin.9     Smokeless tobacco: Never Used   Substance Use Topics     Alcohol use: No     Alcohol/week: 0.0 standard drinks     Drug use: No        Medications:    amLODIPine (NORVASC) 5 MG tablet  aspirin 81 MG tablet  atenolol (TENORMIN) 100 MG tablet  losartan (COZAAR) 100 MG tablet  simvastatin (ZOCOR) 20 MG tablet          Review of Systems   All other systems reviewed and are negative.      Physical Exam   BP: (!) 155/91  Pulse: 57  Temp: 98  F (36.7  C)  Resp: 14  SpO2: 97 %      Physical Exam  Vitals signs and nursing note reviewed.   Constitutional:       General: She is not in acute distress.     Appearance: She is well-developed. She is not diaphoretic.   HENT:      Head: Normocephalic and atraumatic.      Nose: Nose normal.      Mouth/Throat:      Pharynx: No oropharyngeal exudate.   Eyes:      Conjunctiva/sclera: Conjunctivae normal.   Neck:      Musculoskeletal: Normal range of motion and neck supple.   Cardiovascular:      Rate and Rhythm: Normal rate and regular rhythm.      Heart sounds: Normal heart sounds. No murmur. No friction rub.   Pulmonary:      Effort: Pulmonary effort is normal. No respiratory distress.      Breath sounds: Normal breath sounds. No stridor. No wheezing or rales.   Abdominal:      General: Bowel sounds are normal. There is no distension.      Palpations: Abdomen is soft. There is no mass.      Tenderness: There is no abdominal tenderness. There is no guarding.    Musculoskeletal: Normal range of motion.         General: No tenderness.   Skin:     General: Skin is warm and dry.      Capillary Refill: Capillary refill takes less than 2 seconds.      Findings: No erythema.   Neurological:      Mental Status: She is alert and oriented to person, place, and time.   Psychiatric:         Judgment: Judgment normal.         ED Course        Procedures    Results for orders placed or performed during the hospital encounter of 03/05/21   XR Chest 2 Views     Status: None    Narrative    CHEST TWO VIEWS 3/5/2021 12:02 PM     HISTORY: weakness, lightheadedness    COMPARISON: Chest x-ray 5/11/2016       Impression    IMPRESSION: Normal heart size and pulmonary vasculature. Postoperative  changes right lung. No focal consolidations. Mild hyperinflation.  Segmental eventration of the anterior right hemidiaphragm. No pleural  effusion or pneumothorax.    HERMINIA MUÑIZ MD   CT Head w/o Contrast     Status: None    Narrative    CT SCAN OF THE HEAD WITHOUT CONTRAST   3/5/2021 12:22 PM     HISTORY: Transient ischemic attack. History of skin cancer on scalp.  Numbness and lightheadedness. Syncopal episodes.    TECHNIQUE:  Axial images of the head and coronal reformations without  IV contrast material.  Radiation dose for this scan was reduced using  automated exposure control, adjustment of the mA and/or kV according  to patient size, or iterative reconstruction technique.    COMPARISON: 5/18/2019.    FINDINGS: Mild cerebral atrophy is present. There is some minimal  patchy white matter changes in both hemispheres without mass effect.  There is no evidence for intracranial hemorrhage, mass effect, acute  infarct, or skull fracture. Visualized paranasal sinuses and mastoid  air cells are clear.      Impression    IMPRESSION: Chronic changes. No evidence for intracranial hemorrhage  or any acute process.    FLORI AVILA MD   CBC with platelets differential     Status: None   Result Value Ref  Range    WBC 6.1 4.0 - 11.0 10e9/L    RBC Count 5.04 3.8 - 5.2 10e12/L    Hemoglobin 15.1 11.7 - 15.7 g/dL    Hematocrit 44.0 35.0 - 47.0 %    MCV 87 78 - 100 fl    MCH 30.0 26.5 - 33.0 pg    MCHC 34.3 31.5 - 36.5 g/dL    RDW 13.2 10.0 - 15.0 %    Platelet Count 174 150 - 450 10e9/L    Diff Method Automated Method     % Neutrophils 65.0 %    % Lymphocytes 20.3 %    % Monocytes 13.7 %    % Eosinophils 0.5 %    % Basophils 0.3 %    % Immature Granulocytes 0.2 %    Nucleated RBCs 0 0 /100    Absolute Neutrophil 4.0 1.6 - 8.3 10e9/L    Absolute Lymphocytes 1.2 0.8 - 5.3 10e9/L    Absolute Monocytes 0.8 0.0 - 1.3 10e9/L    Absolute Eosinophils 0.0 0.0 - 0.7 10e9/L    Absolute Basophils 0.0 0.0 - 0.2 10e9/L    Abs Immature Granulocytes 0.0 0 - 0.4 10e9/L    Absolute Nucleated RBC 0.0    Comprehensive metabolic panel     Status: Abnormal   Result Value Ref Range    Sodium 135 133 - 144 mmol/L    Potassium 3.6 3.4 - 5.3 mmol/L    Chloride 102 94 - 109 mmol/L    Carbon Dioxide 28 20 - 32 mmol/L    Anion Gap 5 3 - 14 mmol/L    Glucose 110 (H) 70 - 99 mg/dL    Urea Nitrogen 9 7 - 30 mg/dL    Creatinine 0.78 0.52 - 1.04 mg/dL    GFR Estimate 71 >60 mL/min/[1.73_m2]    GFR Estimate If Black 82 >60 mL/min/[1.73_m2]    Calcium 9.0 8.5 - 10.1 mg/dL    Bilirubin Total 0.8 0.2 - 1.3 mg/dL    Albumin 3.6 3.4 - 5.0 g/dL    Protein Total 7.1 6.8 - 8.8 g/dL    Alkaline Phosphatase 96 40 - 150 U/L    ALT 29 0 - 50 U/L    AST 18 0 - 45 U/L     Medications - No data to display    Labs are reviewed and were unremarkable, CT scan of the head was negative.  With her having bilateral arm and bilateral leg numbness, this does not sound like any neurological problem.  I did ask about anxiety and other issues but it does not seem like this is been a big issue.  At this point I think we ruled out any emergent medical condition and we will discharge the patient home.  Recommend follow-up with her doctor next week for further  evaluation.    Assessments & Plan (with Medical Decision Making)  Paresthesias     I have reviewed the nursing notes.    I have reviewed the findings, diagnosis, plan and need for follow up with the patient.              3/5/2021   [facility] EMERGENCY DEPT     Ronny Valdez MD  03/05/21 [id number]

## 2021-03-08 NOTE — TELEPHONE ENCOUNTER
Called pt and she is feeling a little bit better. She is scheduled for 03/17. I told her if she feels much better she can cancel and if she gets worse to let us know.  She is fine with this plan.  Norma Kim, [facility]          Per RF- if she is still not feeling well, we can see her 03/17 at 7:20 if she can wait that long.

## 2021-03-17 ENCOUNTER — IMMUNIZATION (OUTPATIENT)
Dept: FAMILY MEDICINE | Facility: CLINIC | Age: 81
End: 2021-03-17
Payer: COMMERCIAL

## 2021-03-17 ENCOUNTER — OFFICE VISIT (OUTPATIENT)
Dept: FAMILY MEDICINE | Facility: CLINIC | Age: 81
End: 2021-03-17
Payer: COMMERCIAL

## 2021-03-17 VITALS
OXYGEN SATURATION: 98 % | WEIGHT: 135.5 LBS | RESPIRATION RATE: 12 BRPM | DIASTOLIC BLOOD PRESSURE: 80 MMHG | SYSTOLIC BLOOD PRESSURE: 160 MMHG | HEIGHT: 63 IN | BODY MASS INDEX: 24.01 KG/M2 | HEART RATE: 65 BPM | TEMPERATURE: 96.7 F

## 2021-03-17 DIAGNOSIS — K59.01 SLOW TRANSIT CONSTIPATION: Primary | ICD-10-CM

## 2021-03-17 DIAGNOSIS — I10 HYPERTENSION GOAL BP (BLOOD PRESSURE) < 150/90: ICD-10-CM

## 2021-03-17 DIAGNOSIS — R53.83 FATIGUE, UNSPECIFIED TYPE: ICD-10-CM

## 2021-03-17 PROCEDURE — 91301 PR COVID VAC MODERNA 100 MCG/0.5 ML IM: CPT

## 2021-03-17 PROCEDURE — 99214 OFFICE O/P EST MOD 30 MIN: CPT | Performed by: FAMILY MEDICINE

## 2021-03-17 PROCEDURE — 0011A PR COVID VAC MODERNA 100 MCG/0.5 ML IM: CPT

## 2021-03-17 RX ORDER — LOSARTAN POTASSIUM 100 MG/1
100 TABLET ORAL DAILY
Qty: 90 TABLET | Refills: 3 | Status: SHIPPED | OUTPATIENT
Start: 2021-03-17 | End: 2022-03-09

## 2021-03-17 RX ORDER — ATENOLOL 100 MG/1
100 TABLET ORAL 2 TIMES DAILY
Qty: 180 TABLET | Refills: 3 | Status: SHIPPED | OUTPATIENT
Start: 2021-03-17 | End: 2022-04-11

## 2021-03-17 RX ORDER — SENNA AND DOCUSATE SODIUM 50; 8.6 MG/1; MG/1
1 TABLET, FILM COATED ORAL AT BEDTIME
Qty: 90 TABLET | Refills: 0 | Status: SHIPPED | OUTPATIENT
Start: 2021-03-17 | End: 2021-06-10

## 2021-03-17 ASSESSMENT — PAIN SCALES - GENERAL: PAINLEVEL: NO PAIN (0)

## 2021-03-17 ASSESSMENT — MIFFLIN-ST. JEOR: SCORE: 1043.75

## 2021-03-17 NOTE — PROGRESS NOTES
Assessment & Plan     Hypertension goal BP (blood pressure) < 150/90  Systolic blood pressure slightly elevated but it has been that way for some time I am very hesitant to lower it significantly as patient has had some symptoms with lower blood pressure and I do not want her to fall she is in agreement and wants to keep her medications the same because she feels good.  - atenolol (TENORMIN) 100 MG tablet; Take 1 tablet (100 mg) by mouth 2 times daily  - losartan (COZAAR) 100 MG tablet; Take 1 tablet (100 mg) by mouth daily  - Albumin Random Urine Quantitative with Creat Ratio; Future    Slow transit constipation    - SENNA-docusate sodium (SENNA S) 8.6-50 MG tablet; Take 1 tablet by mouth At Bedtime    Fatigue, unspecified type  Solved we will treat this with observation at this time.    I did spend a fair amount of time reviewing the patient's ER record and her laboratory studies.  I do not feel more laboratory studies are indicated at this time will just follow her closely as an outpatient.      Weston Raines MD  [facility] MIKE Ramires is a 80 year old who presents for the following health issues  accompanied by her slef:    HPI     ED/UC Followup:    Facility:  [facility]  Date of visit: 03/05/21  Reason for visit: she just wasn't feeling well. Dizzy, fatigue.  Current Status: She is feeling better. She states she is 90% better     Patient I was feeling very poorly had a lot of fatigue had no energy and no appetite she came into the emergency room for evaluation was some concern for possible Covid by the family but she was not tested for this in the emergency room.  Patient has improved dramatically.  She is back to her normal state of good health.  She does have some mild constipation wanted know what she can take for it I did talk about some natural products such as prune juice and fiber.  Also the use of senna      Review of Systems   Constitutional, HEENT,  "cardiovascular, pulmonary, gi and gu systems are negative, except as otherwise noted.      Objective    BP (!) 160/80   Pulse 65   Temp 96.7  F (35.9  C) (Tympanic)   Resp 12   Ht 1.6 m (5' 3\")   Wt 61.5 kg (135 lb 8 oz)   LMP  (LMP Unknown)   SpO2 98%   BMI 24.00 kg/m    Body mass index is 24 kg/m .  Physical Exam   GENERAL: healthy, alert and no distress  NECK: no adenopathy, no asymmetry, masses, or scars and thyroid normal to palpation  RESP: lungs clear to auscultation - no rales, rhonchi or wheezes  CV: regular rate and rhythm, normal S1 S2, no S3 or S4, no murmur, click or rub, no peripheral edema and peripheral pulses strong  MS: no gross musculoskeletal defects noted, no edema    I did review all her laboratory data from her ER visit and the ER note.          "

## 2021-03-18 DIAGNOSIS — I10 HYPERTENSION GOAL BP (BLOOD PRESSURE) < 150/90: ICD-10-CM

## 2021-03-18 RX ORDER — LOSARTAN POTASSIUM 100 MG/1
TABLET ORAL
Qty: 90 TABLET | Refills: 3 | OUTPATIENT
Start: 2021-03-18

## 2021-03-19 DIAGNOSIS — I10 HYPERTENSION GOAL BP (BLOOD PRESSURE) < 150/90: ICD-10-CM

## 2021-03-19 RX ORDER — LOSARTAN POTASSIUM 100 MG/1
TABLET ORAL
Qty: 90 TABLET | Refills: 3 | OUTPATIENT
Start: 2021-03-19

## 2021-03-20 DIAGNOSIS — I10 HYPERTENSION GOAL BP (BLOOD PRESSURE) < 150/90: ICD-10-CM

## 2021-03-22 RX ORDER — LOSARTAN POTASSIUM 100 MG/1
TABLET ORAL
Qty: 90 TABLET | Refills: 3 | OUTPATIENT
Start: 2021-03-22

## 2021-04-14 ENCOUNTER — OFFICE VISIT (OUTPATIENT)
Dept: FAMILY MEDICINE | Facility: CLINIC | Age: 81
End: 2021-04-14
Attending: FAMILY MEDICINE
Payer: COMMERCIAL

## 2021-04-14 PROCEDURE — 0012A PR COVID VAC MODERNA 100 MCG/0.5 ML IM: CPT

## 2021-04-14 PROCEDURE — 91301 PR COVID VAC MODERNA 100 MCG/0.5 ML IM: CPT

## 2021-04-22 ENCOUNTER — TELEPHONE (OUTPATIENT)
Dept: FAMILY MEDICINE | Facility: CLINIC | Age: 81
End: 2021-04-22

## 2021-04-22 DIAGNOSIS — I10 HYPERTENSION GOAL BP (BLOOD PRESSURE) < 150/90: ICD-10-CM

## 2021-04-22 DIAGNOSIS — Z13.6 CARDIOVASCULAR SCREENING; LDL GOAL LESS THAN 130: ICD-10-CM

## 2021-04-22 DIAGNOSIS — Z13.6 CARDIOVASCULAR SCREENING; LDL GOAL LESS THAN 130: Primary | ICD-10-CM

## 2021-04-22 LAB
CHOLEST SERPL-MCNC: 145 MG/DL
CREAT UR-MCNC: 95 MG/DL
HDLC SERPL-MCNC: 57 MG/DL
LDLC SERPL CALC-MCNC: 70 MG/DL
MICROALBUMIN UR-MCNC: 34 MG/L
MICROALBUMIN/CREAT UR: 35.78 MG/G CR (ref 0–25)
NONHDLC SERPL-MCNC: 88 MG/DL
TRIGL SERPL-MCNC: 91 MG/DL

## 2021-04-22 PROCEDURE — 80061 LIPID PANEL: CPT | Performed by: FAMILY MEDICINE

## 2021-04-22 PROCEDURE — 36415 COLL VENOUS BLD VENIPUNCTURE: CPT | Performed by: FAMILY MEDICINE

## 2021-04-22 PROCEDURE — 82043 UR ALBUMIN QUANTITATIVE: CPT | Performed by: FAMILY MEDICINE

## 2021-04-22 NOTE — TELEPHONE ENCOUNTER
Yanna looked and verbally ordered what was needed.  Norma Kim [facility]          Pt thought her appt today was lab only. She is not going to be seen by provider but would like labs ordered.  Norma Kim [facility]

## 2021-06-10 DIAGNOSIS — K59.01 SLOW TRANSIT CONSTIPATION: ICD-10-CM

## 2021-06-10 RX ORDER — DOCUSATE SODIUM 50 MG AND SENNOSIDES 8.6 MG 8.6; 5 MG/1; MG/1
TABLET, FILM COATED ORAL
Qty: 90 TABLET | Refills: 1 | Status: SHIPPED | OUTPATIENT
Start: 2021-06-10 | End: 2022-08-25

## 2021-09-22 DIAGNOSIS — I10 HYPERTENSION GOAL BP (BLOOD PRESSURE) < 150/90: ICD-10-CM

## 2021-09-22 RX ORDER — AMLODIPINE BESYLATE 5 MG/1
TABLET ORAL
Qty: 90 TABLET | Refills: 3 | Status: SHIPPED | OUTPATIENT
Start: 2021-09-22 | End: 2022-09-19

## 2021-09-22 NOTE — TELEPHONE ENCOUNTER
Routing refill request to provider for review/approval because:  Elevated BP on file.     Barbi Madden RN

## 2021-09-23 DIAGNOSIS — I10 HYPERTENSION GOAL BP (BLOOD PRESSURE) < 150/90: ICD-10-CM

## 2021-09-23 RX ORDER — AMLODIPINE BESYLATE 5 MG/1
TABLET ORAL
Qty: 90 TABLET | Refills: 3 | OUTPATIENT
Start: 2021-09-23

## 2021-09-24 DIAGNOSIS — I10 HYPERTENSION GOAL BP (BLOOD PRESSURE) < 150/90: ICD-10-CM

## 2021-09-27 RX ORDER — AMLODIPINE BESYLATE 5 MG/1
TABLET ORAL
Qty: 90 TABLET | Refills: 3 | OUTPATIENT
Start: 2021-09-27

## 2021-10-06 DIAGNOSIS — E78.5 HYPERLIPIDEMIA LDL GOAL <130: ICD-10-CM

## 2021-10-07 RX ORDER — SIMVASTATIN 20 MG
TABLET ORAL
Qty: 90 TABLET | Refills: 1 | Status: SHIPPED | OUTPATIENT
Start: 2021-10-07 | End: 2022-04-06

## 2022-01-13 ENCOUNTER — HOSPITAL ENCOUNTER (EMERGENCY)
Facility: CLINIC | Age: 82
Discharge: ANOTHER HOSPITAL | End: 2022-01-13
Attending: EMERGENCY MEDICINE | Admitting: EMERGENCY MEDICINE
Payer: COMMERCIAL

## 2022-01-13 ENCOUNTER — APPOINTMENT (OUTPATIENT)
Dept: GENERAL RADIOLOGY | Facility: CLINIC | Age: 82
End: 2022-01-13
Attending: EMERGENCY MEDICINE
Payer: COMMERCIAL

## 2022-01-13 VITALS
OXYGEN SATURATION: 93 % | DIASTOLIC BLOOD PRESSURE: 70 MMHG | HEART RATE: 62 BPM | BODY MASS INDEX: 23.06 KG/M2 | WEIGHT: 130.2 LBS | RESPIRATION RATE: 18 BRPM | TEMPERATURE: 98.3 F | SYSTOLIC BLOOD PRESSURE: 136 MMHG

## 2022-01-13 DIAGNOSIS — J18.9 PNEUMONIA OF RIGHT LOWER LOBE DUE TO INFECTIOUS ORGANISM: ICD-10-CM

## 2022-01-13 LAB
ALBUMIN SERPL-MCNC: 2.9 G/DL (ref 3.4–5)
ALP SERPL-CCNC: 91 U/L (ref 40–150)
ALT SERPL W P-5'-P-CCNC: 50 U/L (ref 0–50)
ANION GAP SERPL CALCULATED.3IONS-SCNC: 6 MMOL/L (ref 3–14)
AST SERPL W P-5'-P-CCNC: 58 U/L (ref 0–45)
BASOPHILS # BLD AUTO: 0 10E3/UL (ref 0–0.2)
BASOPHILS NFR BLD AUTO: 0 %
BILIRUB SERPL-MCNC: 1 MG/DL (ref 0.2–1.3)
BUN SERPL-MCNC: 26 MG/DL (ref 7–30)
CALCIUM SERPL-MCNC: 9 MG/DL (ref 8.5–10.1)
CHLORIDE BLD-SCNC: 102 MMOL/L (ref 94–109)
CO2 SERPL-SCNC: 27 MMOL/L (ref 20–32)
CREAT SERPL-MCNC: 1.11 MG/DL (ref 0.52–1.04)
EOSINOPHIL # BLD AUTO: 0 10E3/UL (ref 0–0.7)
EOSINOPHIL NFR BLD AUTO: 0 %
ERYTHROCYTE [DISTWIDTH] IN BLOOD BY AUTOMATED COUNT: 13.2 % (ref 10–15)
FLUAV RNA SPEC QL NAA+PROBE: NEGATIVE
FLUBV RNA RESP QL NAA+PROBE: NEGATIVE
GFR SERPL CREATININE-BSD FRML MDRD: 49 ML/MIN/1.73M2
GLUCOSE BLD-MCNC: 138 MG/DL (ref 70–99)
HCT VFR BLD AUTO: 42.4 % (ref 35–47)
HGB BLD-MCNC: 14.3 G/DL (ref 11.7–15.7)
IMM GRANULOCYTES # BLD: 0.1 10E3/UL
IMM GRANULOCYTES NFR BLD: 1 %
LACTATE SERPL-SCNC: 1.4 MMOL/L (ref 0.7–2)
LYMPHOCYTES # BLD AUTO: 1 10E3/UL (ref 0.8–5.3)
LYMPHOCYTES NFR BLD AUTO: 7 %
MCH RBC QN AUTO: 29.7 PG (ref 26.5–33)
MCHC RBC AUTO-ENTMCNC: 33.7 G/DL (ref 31.5–36.5)
MCV RBC AUTO: 88 FL (ref 78–100)
MONOCYTES # BLD AUTO: 2.5 10E3/UL (ref 0–1.3)
MONOCYTES NFR BLD AUTO: 16 %
NEUTROPHILS # BLD AUTO: 11.6 10E3/UL (ref 1.6–8.3)
NEUTROPHILS NFR BLD AUTO: 76 %
NRBC # BLD AUTO: 0 10E3/UL
NRBC BLD AUTO-RTO: 0 /100
PLATELET # BLD AUTO: 260 10E3/UL (ref 150–450)
POTASSIUM BLD-SCNC: 3.8 MMOL/L (ref 3.4–5.3)
PROT SERPL-MCNC: 7.4 G/DL (ref 6.8–8.8)
RBC # BLD AUTO: 4.82 10E6/UL (ref 3.8–5.2)
SARS-COV-2 RNA RESP QL NAA+PROBE: NEGATIVE
SODIUM SERPL-SCNC: 135 MMOL/L (ref 133–144)
WBC # BLD AUTO: 15.2 10E3/UL (ref 4–11)

## 2022-01-13 PROCEDURE — 80053 COMPREHEN METABOLIC PANEL: CPT | Performed by: EMERGENCY MEDICINE

## 2022-01-13 PROCEDURE — 71045 X-RAY EXAM CHEST 1 VIEW: CPT

## 2022-01-13 PROCEDURE — 87636 SARSCOV2 & INF A&B AMP PRB: CPT | Performed by: EMERGENCY MEDICINE

## 2022-01-13 PROCEDURE — C9803 HOPD COVID-19 SPEC COLLECT: HCPCS | Performed by: EMERGENCY MEDICINE

## 2022-01-13 PROCEDURE — 99284 EMERGENCY DEPT VISIT MOD MDM: CPT | Mod: 25 | Performed by: EMERGENCY MEDICINE

## 2022-01-13 PROCEDURE — 36415 COLL VENOUS BLD VENIPUNCTURE: CPT | Performed by: EMERGENCY MEDICINE

## 2022-01-13 PROCEDURE — 99284 EMERGENCY DEPT VISIT MOD MDM: CPT | Performed by: EMERGENCY MEDICINE

## 2022-01-13 PROCEDURE — 85025 COMPLETE CBC W/AUTO DIFF WBC: CPT | Performed by: EMERGENCY MEDICINE

## 2022-01-13 PROCEDURE — 250N000011 HC RX IP 250 OP 636: Performed by: EMERGENCY MEDICINE

## 2022-01-13 PROCEDURE — 96365 THER/PROPH/DIAG IV INF INIT: CPT | Performed by: EMERGENCY MEDICINE

## 2022-01-13 PROCEDURE — 83605 ASSAY OF LACTIC ACID: CPT | Performed by: EMERGENCY MEDICINE

## 2022-01-13 PROCEDURE — 96367 TX/PROPH/DG ADDL SEQ IV INF: CPT | Performed by: EMERGENCY MEDICINE

## 2022-01-13 PROCEDURE — 82040 ASSAY OF SERUM ALBUMIN: CPT | Performed by: EMERGENCY MEDICINE

## 2022-01-13 RX ORDER — AZITHROMYCIN 500 MG/1
500 INJECTION, POWDER, LYOPHILIZED, FOR SOLUTION INTRAVENOUS ONCE
Status: COMPLETED | OUTPATIENT
Start: 2022-01-13 | End: 2022-01-13

## 2022-01-13 RX ORDER — CEFTRIAXONE 2 G/1
2 INJECTION, POWDER, FOR SOLUTION INTRAMUSCULAR; INTRAVENOUS ONCE
Status: COMPLETED | OUTPATIENT
Start: 2022-01-13 | End: 2022-01-13

## 2022-01-13 RX ADMIN — AZITHROMYCIN MONOHYDRATE 500 MG: 500 INJECTION, POWDER, LYOPHILIZED, FOR SOLUTION INTRAVENOUS at 13:59

## 2022-01-13 RX ADMIN — CEFTRIAXONE SODIUM 2 G: 2 INJECTION, POWDER, FOR SOLUTION INTRAMUSCULAR; INTRAVENOUS at 13:02

## 2022-01-13 NOTE — ED PROVIDER NOTES
History     Chief Complaint   Patient presents with     possible PNU     HPI  Keyla Mari is a 81 year old female who presents to the emergency department secondary to cough and shortness of breath.  This started about a week ago and seems to be persistent in hospital bit worse.  She has not had any fever, wheezing, sore throat, abdominal pain, nausea vomiting diarrhea or urinary symptoms.  She does not have a history of underlying lung disease.  No history of asthma or COPD.  She does not take inhalers.  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  She is fully vaccinated to COVID including the booster.     Allergies:  Allergies   Allergen Reactions     Indomethacin GI Disturbance       Problem List:    Patient Active Problem List    Diagnosis Date Noted     Cystocele, midline 02/25/2019     Priority: Medium     Hypertension goal BP (blood pressure) < 150/90 08/07/2017     Priority: Medium     Cerebral infarction (H) 06/25/2013     Priority: Medium     Diagnosis updated by automated process. Provider to review and confirm.       Advanced directives, counseling/discussion 07/09/2012     Priority: Medium     Has a copy already here in clinic she states.  7/9/2012        CKD (chronic kidney disease) stage 3, GFR 30-59 ml/min (H) 02/10/2011     Priority: Medium     Lumbar radiculopathy 02/09/2011     Priority: Medium     HYPERLIPIDEMIA LDL GOAL <100 10/31/2010     Priority: Medium     Gout 10/11/2010     Priority: Medium     Displacement of lumbar intervertebral disc without myelopathy 04/07/2008     Priority: Medium     Transient cerebral ischemia 01/24/2006     Priority: Medium     Problem list name updated by automated process. Provider to review          Past Medical History:    Past Medical History:   Diagnosis Date     Acute, but ill-defined, cerebrovascular disease      Basal cell carcinoma      Closed fracture of unspecified part of neck of femur      CVA (cerebral  infarction) 2005     Elevated blood pressure reading without diagnosis of hypertension      Hemorrhage of gastrointestinal tract, unspecified 08/13/2007     History of blood transfusion      HTN (hypertension)      Hyperlipidaemia      Other and unspecified hyperlipidemia      PONV (postoperative nausea and vomiting)      Unspecified cerebral artery occlusion with cerebral infarction        Past Surgical History:    Past Surgical History:   Procedure Laterality Date     COLONOSCOPY  08/13/07     HC DILATION/CURETTAGE DIAG/THER NON OB  1984    D & C     LOBECTOMY LUNG Right 6/5/2015    Procedure: LOBECTOMY LUNG;  Surgeon: Yariel Lund MD;  Location: [facility] OR     PHACOEMULSIFICATION WITH STANDARD INTRAOCULAR LENS IMPLANT Right 5/3/2018    Procedure: PHACOEMULSIFICATION WITH STANDARD INTRAOCULAR LENS IMPLANT;  PHACOEMULSIFICATION WITH STANDARD INTRAOCULAR LENS IMPLANT RIGHT;  Surgeon: Chevy Neves MD;  Location: [facility] OR     PHACOEMULSIFICATION WITH STANDARD INTRAOCULAR LENS IMPLANT Left 5/17/2018    Procedure: PHACOEMULSIFICATION WITH STANDARD INTRAOCULAR LENS IMPLANT;  PHACOEMULSIFICATION WITH STANDARD INTRAOCULAR LENS IMPLANT LEFT EYE;  Surgeon: Chevy Neves MD;  Location: [facility] OR     SURGICAL HISTORY OF -       Left leg fx--had pinning     THORACOTOMY Right 6/5/2015    Procedure: THORACOTOMY;  Surgeon: Yariel Lund MD;  Location:  OR     ZZHC COLONOSCOPY W BIOPSY  07/01/07       Family History:    Family History   Problem Relation Age of Onset     Hypertension Father      Heart Disease Father         Fatal MI at 64     Diabetes Mother         Fatal complication of DM at 70     Hypertension Mother      Cancer Maternal Aunt         Stomach cancer x3     Gastrointestinal Disease Maternal Uncle         Bleeding ulcer - fatal     Diabetes Paternal Grandfather      Heart Disease Paternal Uncle         MI     Heart Disease Maternal Uncle         MI - fatal     Cancer Paternal Aunt          Breast     Circulatory Paternal Aunt         Aortic aneurysm     Breast Cancer Daughter 54       Social History:  Marital Status:   [5]  Social History     Tobacco Use     Smoking status: Former Smoker     Packs/day: 0.00     Quit date: 3/16/1985     Years since quittin.8     Smokeless tobacco: Never Used   Substance Use Topics     Alcohol use: No     Alcohol/week: 0.0 standard drinks     Drug use: No        Medications:    amLODIPine (NORVASC) 5 MG tablet  aspirin 81 MG tablet  atenolol (TENORMIN) 100 MG tablet  losartan (COZAAR) 100 MG tablet  simvastatin (ZOCOR) 20 MG tablet  STIMULANT LAXATIVE 8.6-50 MG tablet          Review of Systems   All other systems reviewed and are negative.      Physical Exam   BP: 134/78  Pulse: 62  Temp: 98.3  F (36.8  C)  Resp: 18  Weight: 59.1 kg (130 lb 3.2 oz)  SpO2: 91 %      Physical Exam  Vitals and nursing note reviewed.   Constitutional:       General: She is not in acute distress.     Appearance: She is well-developed. She is not diaphoretic.   HENT:      Head: Normocephalic and atraumatic.   Eyes:      General: No scleral icterus.  Cardiovascular:      Rate and Rhythm: Normal rate and regular rhythm.      Pulses: Normal pulses.   Pulmonary:      Effort: Pulmonary effort is normal.      Breath sounds: Normal breath sounds.      Comments: Nonproductive cough.  No wheezes, stridor, rhonchi.  No rales.  Abdominal:      Palpations: Abdomen is soft.      Tenderness: There is no abdominal tenderness. There is no guarding or rebound.   Musculoskeletal:         General: Normal range of motion.      Cervical back: Normal range of motion and neck supple.      Right lower leg: No edema.      Left lower leg: No edema.   Skin:     General: Skin is warm and dry.      Coloration: Skin is not pale.      Findings: No erythema or rash.   Neurological:      General: No focal deficit present.      Mental Status: She is alert and oriented to person, place, and time.    Psychiatric:         Mood and Affect: Mood normal.         Thought Content: Thought content normal.         ED Course                 Procedures                Results for orders placed or performed during the hospital encounter of 01/13/22 (from the past 24 hour(s))   CBC with platelets differential    Narrative    The following orders were created for panel order CBC with platelets differential.  Procedure                               Abnormality         Status                     ---------                               -----------         ------                     CBC with platelets and d...[[id number]]  Abnormal            Final result                 Please view results for these tests on the individual orders.   Comprehensive metabolic panel   Result Value Ref Range    Sodium 135 133 - 144 mmol/L    Potassium 3.8 3.4 - 5.3 mmol/L    Chloride 102 94 - 109 mmol/L    Carbon Dioxide (CO2) 27 20 - 32 mmol/L    Anion Gap 6 3 - 14 mmol/L    Urea Nitrogen 26 7 - 30 mg/dL    Creatinine 1.11 (H) 0.52 - 1.04 mg/dL    Calcium 9.0 8.5 - 10.1 mg/dL    Glucose 138 (H) 70 - 99 mg/dL    Alkaline Phosphatase 91 40 - 150 U/L    AST 58 (H) 0 - 45 U/L    ALT 50 0 - 50 U/L    Protein Total 7.4 6.8 - 8.8 g/dL    Albumin 2.9 (L) 3.4 - 5.0 g/dL    Bilirubin Total 1.0 0.2 - 1.3 mg/dL    GFR Estimate 49 (L) >60 mL/min/1.73m2   Lactic acid whole blood   Result Value Ref Range    Lactic Acid 1.4 0.7 - 2.0 mmol/L   Symptomatic; Unknown Influenza A/B & SARS-CoV2 (COVID-19) Virus PCR Multiplex Nasopharyngeal    Specimen: Nasopharyngeal; Swab   Result Value Ref Range    Influenza A PCR Negative Negative    Influenza B PCR Negative Negative    SARS CoV2 PCR Negative Negative    Narrative    Testing was performed using the lisa SARS-CoV-2 & Influenza A/B Assay on the lisa Peri System. This test should be ordered for the detection of SARS-CoV-2 and influenza viruses in individuals who meet clinical and/or epidemiological criteria. Test  performance is unknown in asymptomatic patients. This test is for in vitro diagnostic use under the FDA EUA for laboratories certified under CLIA to perform moderate and/or high complexity testing. This test has not been FDA cleared or approved. A negative result does not rule out the presence of PCR inhibitors in the specimen or target RNA in concentration below the limit of detection for the assay. If only one viral target is positive but coinfection with multiple targets is suspected, the sample should be re-tested with another FDA cleared, approved or authorized test, if coinfection would change clinical management. [facility] are certified under the Clinical Laboratory Improvement Amendments of 1988 (CLIA-88) as  qualified to perform moderate and/or high complexity laboratory testing.   CBC with platelets and differential   Result Value Ref Range    WBC Count 15.2 (H) 4.0 - 11.0 10e3/uL    RBC Count 4.82 3.80 - 5.20 10e6/uL    Hemoglobin 14.3 11.7 - 15.7 g/dL    Hematocrit 42.4 35.0 - 47.0 %    MCV 88 78 - 100 fL    MCH 29.7 26.5 - 33.0 pg    MCHC 33.7 31.5 - 36.5 g/dL    RDW 13.2 10.0 - 15.0 %    Platelet Count 260 150 - 450 10e3/uL    % Neutrophils 76 %    % Lymphocytes 7 %    % Monocytes 16 %    % Eosinophils 0 %    % Basophils 0 %    % Immature Granulocytes 1 %    NRBCs per 100 WBC 0 <1 /100    Absolute Neutrophils 11.6 (H) 1.6 - 8.3 10e3/uL    Absolute Lymphocytes 1.0 0.8 - 5.3 10e3/uL    Absolute Monocytes 2.5 (H) 0.0 - 1.3 10e3/uL    Absolute Eosinophils 0.0 0.0 - 0.7 10e3/uL    Absolute Basophils 0.0 0.0 - 0.2 10e3/uL    Absolute Immature Granulocytes 0.1 <=0.4 10e3/uL    Absolute NRBCs 0.0 10e3/uL   XR Chest Port 1 View    Narrative    CHEST PORTABLE ONE VIEW   1/13/2022 11:33 AM     HISTORY: Cough    COMPARISON: Chest x-rays dated 3/5/2021.    FINDINGS:  Patchy infiltrative type process is seen in the mid to  lower right lung. Left lung is clear. Right upper lung is clear.  Heart  is upper normal size. Right hilum is mildly prominent which could  represent lymphadenopathy. This could also represent the patient's  normal pulmonary vascularity. Mediastinum and pulmonary vasculature  are otherwise within normal limits. No pneumothorax, significant  pleural fluid collection, or acute osseous fracture. Dextroconvex  curvature of the lumbar spine is noted.      Impression    IMPRESSION:  1. Patchy infiltrate right lower lung is concerning for pneumonia  given the patient's history. Recommend appropriate clinical treatment  and follow-up chest x-rays to resolution of the right pulmonary  findings.  2. Mildly prominent right hilar region could represent lymphadenopathy  in the appropriate setting. Attention to this region on future exams  is also recommended.    LIO RIBERA MD         SYSTEM ID:  [id number]       Medications   cefTRIAXone (ROCEPHIN) 2 g vial to attach to  ml bag for ADULTS or NS 50 ml bag for PEDS (0 g Intravenous Stopped 1/13/22 1354)   azithromycin (ZITHROMAX) 500 mg vial to attach to  mL bag (0 mg Intravenous Stopped 1/13/22 1518)       Assessments & Plan (with Medical Decision Making)  81-year-old female who presents with cough and shortness of breath.  Initial oxygen saturations were 91%.  No wheezing or history of COPD.  COVID swab performed along with chest x-ray and blood work.  Findings are consistent with pneumonia.  COVID swab is negative.  The patient appears well but oxygen saturations drifted below 90% and stayed there.  She was given IV azithromycin and ceftriaxone.  At this point she cannot be discharged home.  There were no beds available here.  I discussed it with Dr. Mora at [facility] who accepted the patient in transfer and agreed with the care.     I have reviewed the nursing notes.    I have reviewed the findings, diagnosis, plan and need for follow up with the patient.      Discharge Medication List as of 1/13/2022  7:50 PM           Final diagnoses:   Pneumonia of right lower lobe due to infectious organism       1/13/2022   [facility] EMERGENCY DEPT     Graeme Terrazas MD  01/13/22 7174

## 2022-03-09 DIAGNOSIS — I10 HYPERTENSION GOAL BP (BLOOD PRESSURE) < 150/90: ICD-10-CM

## 2022-03-09 RX ORDER — LOSARTAN POTASSIUM 100 MG/1
TABLET ORAL
Qty: 90 TABLET | Refills: 3 | Status: SHIPPED | OUTPATIENT
Start: 2022-03-09 | End: 2023-02-23

## 2022-03-09 NOTE — TELEPHONE ENCOUNTER
Routing refill request to provider for review/approval because:  Labs out of range:  Creatinine     Barbi Madden RN

## 2022-03-24 ENCOUNTER — TELEPHONE (OUTPATIENT)
Dept: FAMILY MEDICINE | Facility: CLINIC | Age: 82
End: 2022-03-24
Payer: COMMERCIAL

## 2022-03-24 NOTE — TELEPHONE ENCOUNTER
Pt calling to see if she can be worked in sooner than 4/12 for increasing pain in her arms/hands she thinks is due to arthritis. Can we use [facility] spot on 3/30 1020?    Pt aware pcp is not in until 3/29 and will wait until then to hear back.

## 2022-04-05 ENCOUNTER — OFFICE VISIT (OUTPATIENT)
Dept: FAMILY MEDICINE | Facility: CLINIC | Age: 82
End: 2022-04-05
Payer: COMMERCIAL

## 2022-04-05 VITALS
BODY MASS INDEX: 24.1 KG/M2 | OXYGEN SATURATION: 97 % | SYSTOLIC BLOOD PRESSURE: 152 MMHG | RESPIRATION RATE: 20 BRPM | TEMPERATURE: 97.4 F | HEART RATE: 60 BPM | WEIGHT: 136 LBS | DIASTOLIC BLOOD PRESSURE: 78 MMHG | HEIGHT: 63 IN

## 2022-04-05 DIAGNOSIS — M25.40 JOINT SWELLING: Primary | ICD-10-CM

## 2022-04-05 PROBLEM — J18.9 COMMUNITY ACQUIRED PNEUMONIA OF RIGHT LUNG: Status: RESOLVED | Noted: 2022-01-14 | Resolved: 2022-04-05

## 2022-04-05 PROBLEM — J18.9 COMMUNITY ACQUIRED PNEUMONIA OF RIGHT LUNG: Status: ACTIVE | Noted: 2022-01-14

## 2022-04-05 LAB
CRP SERPL-MCNC: 7.1 MG/L (ref 0–8)
ERYTHROCYTE [SEDIMENTATION RATE] IN BLOOD BY WESTERGREN METHOD: 12 MM/HR (ref 0–30)

## 2022-04-05 PROCEDURE — 86431 RHEUMATOID FACTOR QUANT: CPT | Performed by: FAMILY MEDICINE

## 2022-04-05 PROCEDURE — 99213 OFFICE O/P EST LOW 20 MIN: CPT | Performed by: FAMILY MEDICINE

## 2022-04-05 PROCEDURE — 86140 C-REACTIVE PROTEIN: CPT | Performed by: FAMILY MEDICINE

## 2022-04-05 PROCEDURE — 85652 RBC SED RATE AUTOMATED: CPT | Performed by: FAMILY MEDICINE

## 2022-04-05 PROCEDURE — 36415 COLL VENOUS BLD VENIPUNCTURE: CPT | Performed by: FAMILY MEDICINE

## 2022-04-05 RX ORDER — PREDNISONE 10 MG/1
TABLET ORAL
Qty: 48 TABLET | Refills: 0 | Status: SHIPPED | OUTPATIENT
Start: 2022-04-05 | End: 2022-04-28

## 2022-04-05 ASSESSMENT — PAIN SCALES - GENERAL: PAINLEVEL: MODERATE PAIN (5)

## 2022-04-05 NOTE — PROGRESS NOTES
"  Encounter Diagnosis   Name Primary?     Joint swelling Yes     Patient a burst and taper of prednisone.  I will see her back in 1 month.  Wait for her RA testing to come in it may just be osteoarthritis which is causing her issues we will try to figure out one of the nonsteroidals we can use long-term if that is the case.    Lavinia Ramires is a 81 year old who presents for the following health issues     HPI     Concern - arm and hand pain   Onset: about a month ago   Description: ache feeling, couldn't raise arms  Intensity: mild, moderate, severe  Progression of Symptoms:  improving  Accompanying Signs & Symptoms: swollen fingers  Previous history of similar problem: no  Precipitating factors:        Worsened by: NA  Alleviating factors:        Improved by: NA  Therapies tried and outcome:  none     Patient comes in today with complaints of pain in her wrists and her hands.  She states the pain got so bad it was difficult for her to do things around the house she does live with her daughter and her daughter was having to do lots of her activities of daily living or at least help her.  She has never been tested for rheumatoid arthritis.  There is some history in the family.  No fever or chills.  Activity and appetite are normal.  No night sweats or weight loss.  I did tell her like to get some laboratory studies and start her on        Review of Systems   Joint swelling       Objective    BP (!) 152/78   Pulse 60   Temp 97.4  F (36.3  C) (Temporal)   Resp 20   Ht 1.6 m (5' 3\")   Wt 61.7 kg (136 lb)   LMP  (LMP Unknown)   SpO2 97%   BMI 24.09 kg/m    Body mass index is 24.09 kg/m .  Physical Exam   GENERAL: healthy, alert and no distress  MS: Significant swelling over the MP joints and the PIP joints of both hands.  They might be slightly warm compared to the other areas of her skin.  She does have good range of motion and she is able to make a fist in both hands but she states it feels tight.  Some " swelling of the carpal bones and of the wrist joint.    Results for orders placed or performed in visit on 04/05/22 (from the past 24 hour(s))   ESR: Erythrocyte sedimentation rate   Result Value Ref Range    Erythrocyte Sedimentation Rate 12 0 - 30 mm/hr   CRP, inflammation   Result Value Ref Range    CRP Inflammation 7.1 0.0 - 8.0 mg/L

## 2022-04-06 DIAGNOSIS — E78.5 HYPERLIPIDEMIA LDL GOAL <130: ICD-10-CM

## 2022-04-06 LAB — RHEUMATOID FACT SER NEPH-ACNC: 303 IU/ML

## 2022-04-06 RX ORDER — SIMVASTATIN 20 MG
TABLET ORAL
Qty: 90 TABLET | Refills: 2 | Status: SHIPPED | OUTPATIENT
Start: 2022-04-06 | End: 2022-12-29

## 2022-04-06 NOTE — TELEPHONE ENCOUNTER
Routing refill request to provider for review/approval because:  Drug interaction warning    Barbi Madden RN

## 2022-04-10 DIAGNOSIS — I10 HYPERTENSION GOAL BP (BLOOD PRESSURE) < 150/90: ICD-10-CM

## 2022-04-11 RX ORDER — ATENOLOL 100 MG/1
TABLET ORAL
Qty: 180 TABLET | Refills: 3 | Status: SHIPPED | OUTPATIENT
Start: 2022-04-11 | End: 2023-04-05

## 2022-04-14 DIAGNOSIS — M05.79 RHEUMATOID ARTHRITIS INVOLVING MULTIPLE SITES WITH POSITIVE RHEUMATOID FACTOR (H): Primary | ICD-10-CM

## 2022-04-14 RX ORDER — MELOXICAM 15 MG/1
15 TABLET ORAL DAILY
Qty: 31 TABLET | Refills: 1 | Status: SHIPPED | OUTPATIENT
Start: 2022-04-14 | End: 2022-08-25

## 2022-04-14 NOTE — PROGRESS NOTES
Spoke with the patient about her positive rheumatoid factor.  She did have a dramatic response to the prednisone which I knew she would.  Now that she is off her prednisone we will get her started on some Mobic 15 mg daily to see if this works for her it may be sometime before he gets into rheumatology a consult was placed.  Patient knows to contact me if she has any question concerns with the new medication.     Weston Raines MD

## 2022-04-28 ENCOUNTER — OFFICE VISIT (OUTPATIENT)
Dept: RHEUMATOLOGY | Facility: CLINIC | Age: 82
End: 2022-04-28
Attending: FAMILY MEDICINE
Payer: COMMERCIAL

## 2022-04-28 ENCOUNTER — ANCILLARY PROCEDURE (OUTPATIENT)
Dept: GENERAL RADIOLOGY | Facility: CLINIC | Age: 82
End: 2022-04-28
Attending: PHYSICIAN ASSISTANT
Payer: COMMERCIAL

## 2022-04-28 VITALS
BODY MASS INDEX: 24.1 KG/M2 | DIASTOLIC BLOOD PRESSURE: 80 MMHG | HEIGHT: 63 IN | SYSTOLIC BLOOD PRESSURE: 136 MMHG | WEIGHT: 136 LBS

## 2022-04-28 DIAGNOSIS — M05.79 RHEUMATOID ARTHRITIS INVOLVING MULTIPLE SITES WITH POSITIVE RHEUMATOID FACTOR (H): ICD-10-CM

## 2022-04-28 DIAGNOSIS — N18.30 STAGE 3 CHRONIC KIDNEY DISEASE, UNSPECIFIED WHETHER STAGE 3A OR 3B CKD (H): ICD-10-CM

## 2022-04-28 DIAGNOSIS — M25.50 POLYARTHRALGIA: ICD-10-CM

## 2022-04-28 DIAGNOSIS — M05.79 RHEUMATOID ARTHRITIS INVOLVING MULTIPLE SITES WITH POSITIVE RHEUMATOID FACTOR (H): Primary | ICD-10-CM

## 2022-04-28 DIAGNOSIS — R74.01 ELEVATED AST (SGOT): ICD-10-CM

## 2022-04-28 LAB
ALBUMIN SERPL-MCNC: 3.6 G/DL (ref 3.4–5)
ALP SERPL-CCNC: 83 U/L (ref 40–150)
ALT SERPL W P-5'-P-CCNC: 26 U/L (ref 0–50)
ANION GAP SERPL CALCULATED.3IONS-SCNC: 4 MMOL/L (ref 3–14)
AST SERPL W P-5'-P-CCNC: 16 U/L (ref 0–45)
BILIRUB SERPL-MCNC: 0.5 MG/DL (ref 0.2–1.3)
BUN SERPL-MCNC: 16 MG/DL (ref 7–30)
CALCIUM SERPL-MCNC: 8.9 MG/DL (ref 8.5–10.1)
CHLORIDE BLD-SCNC: 107 MMOL/L (ref 94–109)
CO2 SERPL-SCNC: 28 MMOL/L (ref 20–32)
CREAT SERPL-MCNC: 0.89 MG/DL (ref 0.52–1.04)
ERYTHROCYTE [DISTWIDTH] IN BLOOD BY AUTOMATED COUNT: 13.9 % (ref 10–15)
GFR SERPL CREATININE-BSD FRML MDRD: 64 ML/MIN/1.73M2
GLUCOSE BLD-MCNC: 102 MG/DL (ref 70–99)
HCT VFR BLD AUTO: 43 % (ref 35–47)
HGB BLD-MCNC: 14.4 G/DL (ref 11.7–15.7)
MCH RBC QN AUTO: 30.3 PG (ref 26.5–33)
MCHC RBC AUTO-ENTMCNC: 33.5 G/DL (ref 31.5–36.5)
MCV RBC AUTO: 91 FL (ref 78–100)
PLATELET # BLD AUTO: 181 10E3/UL (ref 150–450)
POTASSIUM BLD-SCNC: 4.2 MMOL/L (ref 3.4–5.3)
PROT SERPL-MCNC: 7.4 G/DL (ref 6.8–8.8)
RBC # BLD AUTO: 4.75 10E6/UL (ref 3.8–5.2)
SODIUM SERPL-SCNC: 139 MMOL/L (ref 133–144)
WBC # BLD AUTO: 7.9 10E3/UL (ref 4–11)

## 2022-04-28 PROCEDURE — 87340 HEPATITIS B SURFACE AG IA: CPT

## 2022-04-28 PROCEDURE — 86803 HEPATITIS C AB TEST: CPT

## 2022-04-28 PROCEDURE — 80053 COMPREHEN METABOLIC PANEL: CPT

## 2022-04-28 PROCEDURE — 86200 CCP ANTIBODY: CPT

## 2022-04-28 PROCEDURE — 36415 COLL VENOUS BLD VENIPUNCTURE: CPT

## 2022-04-28 PROCEDURE — 73130 X-RAY EXAM OF HAND: CPT | Mod: TC | Performed by: RADIOLOGY

## 2022-04-28 PROCEDURE — 99204 OFFICE O/P NEW MOD 45 MIN: CPT | Performed by: PHYSICIAN ASSISTANT

## 2022-04-28 PROCEDURE — 85027 COMPLETE CBC AUTOMATED: CPT

## 2022-04-28 NOTE — PATIENT INSTRUCTIONS
After Visit Instructions:     Thank you for coming to [facility] for your care. It is my goal to partner with you to help you reach your optimal state of health.       Diagnosis:  Rheumatoid arthritis: will check further labs and start you on Methotrexate OR Sulfasalazine. If you liver function test is still high or you kidney test is higher I will want to start you on Sulfasalazine instead.     Plan:     Schedule follow-up with Latoya Mohr PA-C in 2 months.   Imaging: xray hands  Labs: CMP, CCP antibodies, CBC, hepatitis C and B  Medication recommendations:   Methotrexate: Will start you on 10mg (4 tablets) WEEKLY. While on Methotrexate:  -check labs (ALT/AST, albumin, CBC with platelets and creatinine) every monthly x3.  -Limit alcohol to 2 drinks weekly  -Take Folic Acid 1mg daily   -Tylenol 500-1000mg can be used as needed up to three times daily for nausea/headache associated with dosing   OR   Sulfasalazine 500mg: take 1 tablet daily. Will check the same labs as above monthly. Take with food.   Once I get your lab results I will let you know which medication we will start. I will likely contact you early next week with the recommendation.       Latoya Mohr PA-C  [facility]  [facility]    Contact information: [facility]  Clinic Number:  [phone]  Please call or send a [employer] message with any questions about your care

## 2022-04-28 NOTE — PROGRESS NOTES
Rheumatology Clinic Visit  [facility]  Latoya BairdKAMALA reynolds     Keyla Mari MRN# [id number]   YOB: 1940 Age: 81 year old   Date of Visit: 04/28/2022  Primary care provider: Weston Raines          Assessment and Plan:     1.  Rheumatoid arthritis involving multiple sites  2.  Polyarthralgia  3.  Elevated AST  4.  Stage III chronic kidney disease    Patient presents today reporting that approximately 1 month ago she started to have an increase in pain in her hands, arms and shoulders.  She states that it lasted for approximately 2 weeks and then resolved.  At that time she was given prednisone and states that the prednisone started working almost immediately.  She completed the prednisone burst just over 1 week ago and has not had a return of her symptoms.  Physical examination showed some bogginess to the right fifth MCP.  No active synovitis, dactylitis, tenosynovitis, or enthesitis.  No tenderness to palpation of her MCPs or PIPs.  Full joint range of motion.  Laboratory examination from 4/5/2022 was reviewed and showed a rheumatoid factor of 303.  CRP and sed rate were normal.    Reviewed with the patient that it certainly sounds like she had a flare of rheumatoid arthritis.  Her rheumatoid factor is quite elevated therefore making the likelihood of rheumatoid arthritis higher.  We will check CCP antibodies as well as x-rays of her hands.  We did discuss treatment for rheumatoid arthritis including methotrexate or sulfasalazine.  In January her kidney test was elevated as well as her AST.  I like to recheck those labs as well as hepatitis labs prior to starting a treatment.  Certainly if her liver function tests has increased more or her creatinine has increased more would recommend starting with sulfasalazine over methotrexate.  Side effects of these medications were reviewed with the patient today.  She was educated that I will contact her likely next week with the medication  "recommendation.    Plan:     1. Schedule follow-up with Latoya Mohr PA-C in 2 months.   2. Imaging: xray hands  3. Labs: CMP, CCP antibodies, CBC, hepatitis C and B  4. Medication recommendations:   a. Methotrexate: Will start you on 10mg (4 tablets) WEEKLY. While on Methotrexate:  b. -check labs (ALT/AST, albumin, CBC with platelets and creatinine) every monthly x3.  c. -Limit alcohol to 2 drinks weekly  d. -Take Folic Acid 1mg daily   e. -Tylenol 500-1000mg can be used as needed up to three times daily for nausea/headache associated with dosing   OR   Sulfasalazine 500mg: take 1 tablet daily. Will check the same labs as above monthly. Take with food.   5. Once I get her lab results I will lether know which medication we will start. I will likely contact her early next week with the recommendation.     Latoya Mohr, [facility]  Rheumatology         History of Present Illness:   Keyla Mari presents for evaluation of rheumatoid arthritis.      She reports that about 1 month ago she started to have more pain in her hands, arms and shoulders. She states that it lasted for about 2 weeks. It then resolved. She states that she has not had it back again. She took Prednisone and states that the relief was almost immediate. She was then given Meloxicam but never started it. She has been off of the Prednsione for just over a week. At the time of her symptoms she felt worse in the morning. She states that it took a couple of hours to loosen up. She was unable to make fists and her hands were about \"twice the size\" and her watch was tight as well. She did not notice any pain in her feet or ankles. She states that she does have the start neuropathy in her feet. No skin rashes or mouth sores. No fevers. No significant fatigue.     No known family history of lupus or RA. No family or personal history of psoriasis, ulcerative colitis or crohn's.          Review of Systems:     Constitutional: negative  Skin: negative  Eyes: " negative  Ears/Nose/Throat: negative  Respiratory: No shortness of breath, dyspnea on exertion, cough, or hemoptysis  Cardiovascular: negative  Gastrointestinal: negative  Genitourinary: negative  Musculoskeletal: As above  Neurologic: negative  Psychiatric: negative  Hematologic/Lymphatic/Immunologic: negative  Endocrine: negative         Active Problem List:     Patient Active Problem List    Diagnosis Date Noted     Cystocele, midline 02/25/2019     Priority: Medium     Hypertension goal BP (blood pressure) < 150/90 08/07/2017     Priority: Medium     Cerebral infarction (H) 06/25/2013     Priority: Medium     Diagnosis updated by automated process. Provider to review and confirm.       Advanced directives, counseling/discussion 07/09/2012     Priority: Medium     Has a copy already here in clinic she states.  7/9/2012        Lumbar radiculopathy 02/09/2011     Priority: Medium     HYPERLIPIDEMIA LDL GOAL <100 10/31/2010     Priority: Medium     Gout 10/11/2010     Priority: Medium     Displacement of lumbar intervertebral disc without myelopathy 04/07/2008     Priority: Medium     Transient cerebral ischemia 01/24/2006     Priority: Medium     Problem list name updated by automated process. Provider to review              Past Medical History:     Past Medical History:   Diagnosis Date     Acute, but ill-defined, cerebrovascular disease      Basal cell carcinoma      Closed fracture of unspecified part of neck of femur     Hip fracture     CVA (cerebral infarction) 2005     Elevated blood pressure reading without diagnosis of hypertension      Hemorrhage of gastrointestinal tract, unspecified 08/13/2007    Transfer to [facility]     History of blood transfusion     after colonoscopy     HTN (hypertension)      Hyperlipidaemia      Other and unspecified hyperlipidemia      PONV (postoperative nausea and vomiting)      Unspecified cerebral artery occlusion with cerebral infarction      Past Surgical History:    Procedure Laterality Date     COLONOSCOPY  07     HC DILATION/CURETTAGE DIAG/THER NON OB  1984    D & C     LOBECTOMY LUNG Right 2015    Procedure: LOBECTOMY LUNG;  Surgeon: Yariel Lund MD;  Location: [facility] OR     PHACOEMULSIFICATION WITH STANDARD INTRAOCULAR LENS IMPLANT Right 5/3/2018    Procedure: PHACOEMULSIFICATION WITH STANDARD INTRAOCULAR LENS IMPLANT;  PHACOEMULSIFICATION WITH STANDARD INTRAOCULAR LENS IMPLANT RIGHT;  Surgeon: Chevy Neves MD;  Location: [facility] OR     PHACOEMULSIFICATION WITH STANDARD INTRAOCULAR LENS IMPLANT Left 2018    Procedure: PHACOEMULSIFICATION WITH STANDARD INTRAOCULAR LENS IMPLANT;  PHACOEMULSIFICATION WITH STANDARD INTRAOCULAR LENS IMPLANT LEFT EYE;  Surgeon: Chevy Neves MD;  Location: [facility] OR     SURGICAL HISTORY OF -       Left leg fx--had pinning     THORACOTOMY Right 2015    Procedure: THORACOTOMY;  Surgeon: Yariel Lund MD;  Location: [facility] OR     ZZHC COLONOSCOPY W BIOPSY  07            Social History:     Social History     Socioeconomic History     Marital status:      Spouse name: Not on file     Number of children: Not on file     Years of education: Not on file     Highest education level: Not on file   Occupational History     Not on file   Tobacco Use     Smoking status: Former Smoker     Packs/day: 0.00     Quit date: 3/16/1985     Years since quittin.1     Smokeless tobacco: Never Used   Substance and Sexual Activity     Alcohol use: No     Alcohol/week: 0.0 standard drinks     Drug use: No     Sexual activity: Never   Other Topics Concern     Parent/sibling w/ CABG, MI or angioplasty before 65F 55M? Not Asked   Social History Narrative     Not on file     Social Determinants of Health     Financial Resource Strain: Not on file   Food Insecurity: Not on file   Transportation Needs: Not on file   Physical Activity: Not on file   Stress: Not on file   Social Connections: Not on file   Intimate  Partner Violence: Not on file   Housing Stability: Not on file          Family History:     Family History   Problem Relation Age of Onset     Hypertension Father      Heart Disease Father         Fatal MI at 64     Diabetes Mother         Fatal complication of DM at 70     Hypertension Mother      Cancer Maternal Aunt         Stomach cancer x3     Gastrointestinal Disease Maternal Uncle         Bleeding ulcer - fatal     Diabetes Paternal Grandfather      Heart Disease Paternal Uncle         MI     Heart Disease Maternal Uncle         MI - fatal     Cancer Paternal Aunt         Breast     Circulatory Paternal Aunt         Aortic aneurysm     Breast Cancer Daughter 54            Allergies:     Allergies   Allergen Reactions     Indomethacin GI Disturbance            Medications:     Current Outpatient Medications   Medication Sig Dispense Refill     amLODIPine (NORVASC) 5 MG tablet TAKE ONE TABLET BY MOUTH ONCE DAILY (Patient taking differently: Take 5 mg by mouth daily ) 90 tablet 3     aspirin 81 MG tablet Take 1 tablet (81 mg) by mouth daily       atenolol (TENORMIN) 100 MG tablet TAKE ONE TABLET BY MOUTH TWICE A  tablet 3     losartan (COZAAR) 100 MG tablet TAKE ONE TABLET BY MOUTH ONCE DAILY 90 tablet 3     meloxicam (MOBIC) 15 MG tablet Take 1 tablet (15 mg) by mouth daily 31 tablet 1     predniSONE (DELTASONE) 10 MG tablet Six tabs a day for 3 days then decrease by one tab every other day until off. 48 tablet 0     simvastatin (ZOCOR) 20 MG tablet TAKE ONE TABLET BY MOUTH AT BEDTIME 90 tablet 2     STIMULANT LAXATIVE 8.6-50 MG tablet TAKE ONE TABLET BY MOUTH AT BEDTIME (Patient not taking: Reported on 4/5/2022) 90 tablet 1            Physical Exam:   not currently breastfeeding.  Wt Readings from Last 6 Encounters:   04/05/22 61.7 kg (136 lb)   01/13/22 59.1 kg (130 lb 3.2 oz)   03/17/21 61.5 kg (135 lb 8 oz)   09/29/20 66.2 kg (146 lb)   03/11/20 65 kg (143 lb 6.4 oz)   10/09/19 63.7 kg (140 lb 6 oz)      Constitutional: well-developed, appearing stated age; cooperative  Eyes: nl EOM, PERRLA, vision, conjunctiva, sclera  ENT: nl external ears, nose, hearing, lips, teeth, gums, throat. No mucositis.   No mucous membrane lesions, normal saliva pool  Neck: no mass or thyroid enlargement  Resp: lungs clear to auscultation, nl to palpation  CV: RRR, no murmurs, rubs or gallops, no edema  Lymph: no cervical, supraclavicular or epitrochlear nodes  MS: The shoulder, elbow, wrist, MCP/PIP/DIP, knee, ankle, and foot MTP/IP joints were examined.  She has a bogginess to palpation of the right fifth MCP, no active synovitis or altered joint anatomy. Full joint ROM. No dactylitis,  tenosynovitis, enthesopathy.   Skin: no nail pitting, alopecia, rash, nodules or lesions.   Neuro: nl cranial nerves.   Psych: nl judgement, orientation, memory, affect.           Data:   Imaging:  N/A    Laboratory:  1/13/22  Creatinine 1.11, GFR 49  Calcium 9.0  Albumin 2.9  ALT 50  AST 58    4/5/22  CRP 7.1  Rheumatoid Factor 303  Sed Rate 12

## 2022-04-29 LAB
HBV SURFACE AG SERPL QL IA: NONREACTIVE
HCV AB SERPL QL IA: NONREACTIVE

## 2022-05-02 DIAGNOSIS — M05.79 RHEUMATOID ARTHRITIS INVOLVING MULTIPLE SITES WITH POSITIVE RHEUMATOID FACTOR (H): Primary | ICD-10-CM

## 2022-05-02 DIAGNOSIS — Z79.631 LONG TERM METHOTREXATE USER: ICD-10-CM

## 2022-05-02 LAB — CCP AB SER IA-ACNC: 108 U/ML

## 2022-05-02 RX ORDER — FOLIC ACID 1 MG/1
1 TABLET ORAL DAILY
Qty: 90 TABLET | Refills: 1 | Status: SHIPPED | OUTPATIENT
Start: 2022-05-02 | End: 2022-08-25

## 2022-05-02 NOTE — TELEPHONE ENCOUNTER
Methotrexate and Folic Acid sent to requested pharmacy. Lab orders placed to have labs completed in 4 weeks and in 8 weeks.     Latoya Mohr PA-C on 5/2/2022 at 3:58 PM

## 2022-05-02 NOTE — TELEPHONE ENCOUNTER
I called pt and informed her of the results and below msg. She is ok with taking both meds. I couldn't find 10 mg for the methotrexate to pend for you. Folic Acid is pending with pharmacy.  Tana Biggs MA

## 2022-05-02 NOTE — TELEPHONE ENCOUNTER
Patient know that I got the lab results back.  Her CCP antibodies were also positive which also goes along with having rheumatoid arthritis.  I would recommend treatment like we discussed in the office visit.  Her creatinine and liver function tests were normal.  Her CBC was normal as well.  Therefore I would recommend the medication methotrexate.  We could start at a low dose of 10 mg (4 tablets) weekly.  Recommend checking blood work monthly like we discussed and is on her after visit summary.  We will also send in a prescription for folic acid 1 mg daily.  Which pharmacy would she like me to send this medication to?    Latoya Mohr PA-C on 5/2/2022 at 2:00 PM       22-Jan-2019

## 2022-05-05 ENCOUNTER — OFFICE VISIT (OUTPATIENT)
Dept: FAMILY MEDICINE | Facility: CLINIC | Age: 82
End: 2022-05-05
Payer: COMMERCIAL

## 2022-05-05 VITALS
DIASTOLIC BLOOD PRESSURE: 70 MMHG | RESPIRATION RATE: 18 BRPM | BODY MASS INDEX: 23.74 KG/M2 | HEART RATE: 64 BPM | SYSTOLIC BLOOD PRESSURE: 132 MMHG | OXYGEN SATURATION: 98 % | TEMPERATURE: 97.7 F | WEIGHT: 134 LBS

## 2022-05-05 DIAGNOSIS — L85.8 CUTANEOUS HORN: Primary | ICD-10-CM

## 2022-05-05 DIAGNOSIS — M05.79 RHEUMATOID ARTHRITIS INVOLVING MULTIPLE SITES WITH POSITIVE RHEUMATOID FACTOR (H): ICD-10-CM

## 2022-05-05 PROCEDURE — 99213 OFFICE O/P EST LOW 20 MIN: CPT | Performed by: FAMILY MEDICINE

## 2022-05-05 ASSESSMENT — PAIN SCALES - GENERAL: PAINLEVEL: NO PAIN (0)

## 2022-05-05 NOTE — PROGRESS NOTES
Assessment & Plan     Cutaneous horn  Size with shave technique patient will keep ointment and a bandage on the operative site until it heals completely.  If she has question concerns during the healing she will contact us.    Rheumatoid arthritis involving multiple sites with positive rheumatoid factor (H)  She has not started her methotrexate yet I did reassure her that this is the safe medicine to take with proper follow-up she will start it this week.  Knows that she needs to come in a week for laboratory studies.      Weston Raines MD,    [facility]EDERICK Ramires is a 81 year old who presents for the following health issues     HPI      Follow up Arthritis        Review of Systems   Constitutional, HEENT, cardiovascular, pulmonary, gi and gu systems are negative, except as otherwise noted.      Objective    /70   Pulse 64   Temp 97.7  F (36.5  C) (Temporal)   Resp 18   Wt 60.8 kg (134 lb)   LMP  (LMP Unknown)   SpO2 98%   BMI 23.74 kg/m    Body mass index is 23.74 kg/m .  Physical Exam   GENERAL: healthy, alert and no distress  SKIN: 1 x 1 cm  cutaneous horn left maxillary region.  She would like it removed.  Very irritating.            The 1 x 1 cm  lesion prepped and draped in the usual sterile fashion.  The area was then infiltrated with 2 cc's of lidocaine with epinephrine. The lesion was then removed with a razor using the shave technique.  The base was hyfercated. Bacitracin and a sterile dressing was applied. The lesion was not sent to pathology. She will watch for any sign of infection.  If she has any concerns she will call me.  She will be informed of pathology report(s) if indicated.

## 2022-06-01 ENCOUNTER — LAB (OUTPATIENT)
Dept: LAB | Facility: CLINIC | Age: 82
End: 2022-06-01
Payer: COMMERCIAL

## 2022-06-01 ENCOUNTER — TELEPHONE (OUTPATIENT)
Dept: RHEUMATOLOGY | Facility: CLINIC | Age: 82
End: 2022-06-01

## 2022-06-01 DIAGNOSIS — M05.79 RHEUMATOID ARTHRITIS INVOLVING MULTIPLE SITES WITH POSITIVE RHEUMATOID FACTOR (H): ICD-10-CM

## 2022-06-01 DIAGNOSIS — Z79.631 LONG TERM METHOTREXATE USER: ICD-10-CM

## 2022-06-01 LAB
ALBUMIN SERPL-MCNC: 3.8 G/DL (ref 3.4–5)
ALT SERPL W P-5'-P-CCNC: 22 U/L (ref 0–50)
AST SERPL W P-5'-P-CCNC: 16 U/L (ref 0–45)
CREAT SERPL-MCNC: 0.91 MG/DL (ref 0.52–1.04)
GFR SERPL CREATININE-BSD FRML MDRD: 62 ML/MIN/1.73M2

## 2022-06-01 PROCEDURE — 82040 ASSAY OF SERUM ALBUMIN: CPT

## 2022-06-01 PROCEDURE — 36415 COLL VENOUS BLD VENIPUNCTURE: CPT

## 2022-06-01 PROCEDURE — 84460 ALANINE AMINO (ALT) (SGPT): CPT

## 2022-06-01 PROCEDURE — 82565 ASSAY OF CREATININE: CPT

## 2022-06-01 PROCEDURE — 84450 TRANSFERASE (AST) (SGOT): CPT

## 2022-06-23 NOTE — PROGRESS NOTES
Rheumatology Clinic Visit  [facility]  KAMALA Lovelace     Keyla Mari MRN# [id number]   YOB: 1940 Age: 82 year old   Date of Visit: 06/23/2022  Primary care provider: Weston Raines          Assessment and Plan:     1.  Rheumatoid arthritis involving multiple sites  2. High risk medication use  3. Positive CCP antibodies     Patient presents today for follow-up for rheumatoid arthritis.  She has been doing much better since her last visit.  She has not had any pain in her hands.  Morning stiffness has resolved.  Physical exam did not show any synovitis, dactylitis, tenosynovitis or enthesitis.  She has no tenderness to palpation of her MCPs or PIPs.  Full joint range of motion.  Laboratory evaluation showed a positive CCP antibody of 108.  Creatinine was normal at 0.91, albumin 3.8, normal transaminases.    Patient has been doing better with the start of methotrexate.  She is tolerating the medication well.  She has not had any return of joint symptoms.  We will continue low-dose methotrexate at 10 mg weekly.  We will have her check her labs today, if normal can decrease frequency to every 3 months.    Plan:     1. Schedule follow-up with Latoya Mohr PA-C in 3 months.   2. Labs: today and if normal then we'll switch to every 3 months  3. Medication recommendations:   a. Methotrexate: Will continue on 10mg (4 tablets) WEEKLY. While on Methotrexate:  b. -Limit alcohol to 2 drinks weekly  c. -Take Folic Acid 1mg daily   d. -Tylenol 500-1000mg can be used as needed up to three times daily for nausea/headache associated with dosing    KAMALA Lovelaec  Rheumatology         History of Present Illness:   Keyla Mari presents for evaluation of rheumatoid arthritis.      Interval history 6/30/22    She has been taking the Methotrexate. She has been taking it on Sundays, she has been on it for about 2 months now. No side effects. She has been doing folic acid daily as well. Her  "joints have been doing good. She has not noticed any morning stiffness. She is able to make fists. No infections since her last visit.     HPI from consult 4/28/22:    She reports that about 1 month ago she started to have more pain in her hands, arms and shoulders. She states that it lasted for about 2 weeks. It then resolved. She states that she has not had it back again. She took Prednisone and states that the relief was almost immediate. She was then given Meloxicam but never started it. She has been off of the Prednsione for just over a week. At the time of her symptoms she felt worse in the morning. She states that it took a couple of hours to loosen up. She was unable to make fists and her hands were about \"twice the size\" and her watch was tight as well. She did not notice any pain in her feet or ankles. She states that she does have the start neuropathy in her feet. No skin rashes or mouth sores. No fevers. No significant fatigue.     No known family history of lupus or RA. No family or personal history of psoriasis, ulcerative colitis or crohn's.          Review of Systems:     Constitutional: negative  Skin: negative  Eyes: negative  Ears/Nose/Throat: negative  Respiratory: No shortness of breath, dyspnea on exertion, cough, or hemoptysis  Cardiovascular: negative  Gastrointestinal: negative  Genitourinary: negative  Musculoskeletal: As above  Neurologic: negative  Psychiatric: negative  Hematologic/Lymphatic/Immunologic: negative  Endocrine: negative         Active Problem List:     Patient Active Problem List    Diagnosis Date Noted     Rheumatoid arthritis involving multiple sites with positive rheumatoid factor (H) 05/05/2022     Priority: Medium     Cystocele, midline 02/25/2019     Priority: Medium     Hypertension goal BP (blood pressure) < 150/90 08/07/2017     Priority: Medium     Cerebral infarction (H) 06/25/2013     Priority: Medium     Diagnosis updated by automated process. Provider to " review and confirm.       Advanced directives, counseling/discussion 07/09/2012     Priority: Medium     Has a copy already here in clinic she states.  7/9/2012        Lumbar radiculopathy 02/09/2011     Priority: Medium     HYPERLIPIDEMIA LDL GOAL <100 10/31/2010     Priority: Medium     Gout 10/11/2010     Priority: Medium     Displacement of lumbar intervertebral disc without myelopathy 04/07/2008     Priority: Medium     Transient cerebral ischemia 01/24/2006     Priority: Medium     Problem list name updated by automated process. Provider to review              Past Medical History:     Past Medical History:   Diagnosis Date     Acute, but ill-defined, cerebrovascular disease      Basal cell carcinoma      Closed fracture of unspecified part of neck of femur     Hip fracture     CVA (cerebral infarction) 2005     Elevated blood pressure reading without diagnosis of hypertension      Hemorrhage of gastrointestinal tract, unspecified 08/13/2007    Transfer to [facility]     History of blood transfusion     after colonoscopy     HTN (hypertension)      Hyperlipidaemia      Other and unspecified hyperlipidemia      PONV (postoperative nausea and vomiting)      Unspecified cerebral artery occlusion with cerebral infarction      Past Surgical History:   Procedure Laterality Date     COLONOSCOPY  08/13/07     HC DILATION/CURETTAGE DIAG/THER NON OB  1984    D & C     LOBECTOMY LUNG Right 6/5/2015    Procedure: LOBECTOMY LUNG;  Surgeon: Yariel Lund MD;  Location: [facility] OR     PHACOEMULSIFICATION WITH STANDARD INTRAOCULAR LENS IMPLANT Right 5/3/2018    Procedure: PHACOEMULSIFICATION WITH STANDARD INTRAOCULAR LENS IMPLANT;  PHACOEMULSIFICATION WITH STANDARD INTRAOCULAR LENS IMPLANT RIGHT;  Surgeon: Chevy Neves MD;  Location: [facility] OR     PHACOEMULSIFICATION WITH STANDARD INTRAOCULAR LENS IMPLANT Left 5/17/2018    Procedure: PHACOEMULSIFICATION WITH STANDARD INTRAOCULAR LENS IMPLANT;   PHACOEMULSIFICATION WITH STANDARD INTRAOCULAR LENS IMPLANT LEFT EYE;  Surgeon: Chevy Neves MD;  Location: [facility] OR     SURGICAL HISTORY OF -       Left leg fx--had pinning     THORACOTOMY Right 2015    Procedure: THORACOTOMY;  Surgeon: Yariel Lund MD;  Location: [facility] OR     ZZHC COLONOSCOPY W BIOPSY  07            Social History:     Social History     Socioeconomic History     Marital status:      Spouse name: Not on file     Number of children: Not on file     Years of education: Not on file     Highest education level: Not on file   Occupational History     Not on file   Tobacco Use     Smoking status: Former Smoker     Packs/day: 0.00     Quit date: 3/16/1985     Years since quittin.2     Smokeless tobacco: Never Used   Substance and Sexual Activity     Alcohol use: No     Alcohol/week: 0.0 standard drinks     Drug use: No     Sexual activity: Never   Other Topics Concern     Parent/sibling w/ CABG, MI or angioplasty before 65F 55M? Not Asked   Social History Narrative     Not on file     Social Determinants of Health     Financial Resource Strain: Not on file   Food Insecurity: Not on file   Transportation Needs: Not on file   Physical Activity: Not on file   Stress: Not on file   Social Connections: Not on file   Intimate Partner Violence: Not on file   Housing Stability: Not on file          Family History:     Family History   Problem Relation Age of Onset     Hypertension Father      Heart Disease Father         Fatal MI at 64     Diabetes Mother         Fatal complication of DM at 70     Hypertension Mother      Cancer Maternal Aunt         Stomach cancer x3     Gastrointestinal Disease Maternal Uncle         Bleeding ulcer - fatal     Diabetes Paternal Grandfather      Heart Disease Paternal Uncle         MI     Heart Disease Maternal Uncle         MI - fatal     Cancer Paternal Aunt         Breast     Circulatory Paternal Aunt         Aortic aneurysm     Breast  Cancer Daughter 54            Allergies:     Allergies   Allergen Reactions     Indomethacin GI Disturbance            Medications:     Current Outpatient Medications   Medication Sig Dispense Refill     amLODIPine (NORVASC) 5 MG tablet TAKE ONE TABLET BY MOUTH ONCE DAILY (Patient taking differently: Take 5 mg by mouth daily) 90 tablet 3     aspirin 81 MG tablet Take 1 tablet (81 mg) by mouth daily       atenolol (TENORMIN) 100 MG tablet TAKE ONE TABLET BY MOUTH TWICE A  tablet 3     folic acid (FOLVITE) 1 MG tablet Take 1 tablet (1 mg) by mouth daily 90 tablet 1     losartan (COZAAR) 100 MG tablet TAKE ONE TABLET BY MOUTH ONCE DAILY 90 tablet 3     meloxicam (MOBIC) 15 MG tablet Take 1 tablet (15 mg) by mouth daily 31 tablet 1     methotrexate 2.5 MG tablet Take 4 tablets (10 mg) by mouth every 7 days Labs in 4 and 8 weeks. 48 tablet 0     simvastatin (ZOCOR) 20 MG tablet TAKE ONE TABLET BY MOUTH AT BEDTIME 90 tablet 2     STIMULANT LAXATIVE 8.6-50 MG tablet TAKE ONE TABLET BY MOUTH AT BEDTIME 90 tablet 1            Physical Exam:   not currently breastfeeding.  Wt Readings from Last 6 Encounters:   05/05/22 60.8 kg (134 lb)   04/28/22 61.7 kg (136 lb)   04/05/22 61.7 kg (136 lb)   01/13/22 59.1 kg (130 lb 3.2 oz)   03/17/21 61.5 kg (135 lb 8 oz)   09/29/20 66.2 kg (146 lb)     Constitutional: well-developed, appearing stated age; cooperative  Eyes: nl conjunctiva, sclera  ENT: nl external ears  Resp: No shortness of breath with normal conversation  MS: The shoulder, elbow, wrist, MCP/PIP/DIP, joints were examined.  no active synovitis or altered joint anatomy. Full joint ROM. No dactylitis,  tenosynovitis, enthesopathy.   Skin: no nail pitting, alopecia, rash, nodules or lesions.   Psych: nl judgement, orientation, memory, affect.           Data:   Imaging:    X-ray bilateral hand 4/20/2022  IMPRESSION:  1.  Right hand: Moderate thumb CMC and third MCP degenerative  arthrosis. Scattered mild degenerative  changes in the hand. No  fracture or periarticular erosion.  2.  Left hand: Moderate thumb CMC degenerative arthrosis. No fracture  or periarticular erosion.    Laboratory:  1/13/22  Creatinine 1.11, GFR 49  Calcium 9.0  Albumin 2.9  ALT 50  AST 58    4/5/22  CRP 7.1  Rheumatoid Factor 303  Sed Rate 12    4/28/2022  Creatinine 0.89, GFR 64  ALT 26, AST 16  CCP antibody 108  CBC within normal parameters  Hepatitis B surface antigen nonreactive  Hepatitis C antibody nonreactive    6/1/22  Creatinine 0.91, GFR 62  Albumin 3.8  ALT 22, AST 16

## 2022-06-30 ENCOUNTER — OFFICE VISIT (OUTPATIENT)
Dept: RHEUMATOLOGY | Facility: CLINIC | Age: 82
End: 2022-06-30
Payer: COMMERCIAL

## 2022-06-30 VITALS
WEIGHT: 135 LBS | DIASTOLIC BLOOD PRESSURE: 74 MMHG | BODY MASS INDEX: 23.92 KG/M2 | SYSTOLIC BLOOD PRESSURE: 130 MMHG | HEIGHT: 63 IN

## 2022-06-30 DIAGNOSIS — Z79.899 HIGH RISK MEDICATION USE: ICD-10-CM

## 2022-06-30 DIAGNOSIS — R76.8 CYCLIC CITRULLINATED PEPTIDE (CCP) ANTIBODY POSITIVE: ICD-10-CM

## 2022-06-30 DIAGNOSIS — M05.79 RHEUMATOID ARTHRITIS INVOLVING MULTIPLE SITES WITH POSITIVE RHEUMATOID FACTOR (H): Primary | ICD-10-CM

## 2022-06-30 DIAGNOSIS — Z79.631 LONG TERM METHOTREXATE USER: ICD-10-CM

## 2022-06-30 LAB
ALBUMIN SERPL-MCNC: 3.9 G/DL (ref 3.4–5)
ALT SERPL W P-5'-P-CCNC: 24 U/L (ref 0–50)
AST SERPL W P-5'-P-CCNC: 16 U/L (ref 0–45)
CREAT SERPL-MCNC: 0.84 MG/DL (ref 0.52–1.04)
GFR SERPL CREATININE-BSD FRML MDRD: 68 ML/MIN/1.73M2

## 2022-06-30 PROCEDURE — 36415 COLL VENOUS BLD VENIPUNCTURE: CPT | Performed by: PHYSICIAN ASSISTANT

## 2022-06-30 PROCEDURE — 99214 OFFICE O/P EST MOD 30 MIN: CPT | Performed by: PHYSICIAN ASSISTANT

## 2022-06-30 PROCEDURE — 82040 ASSAY OF SERUM ALBUMIN: CPT | Performed by: PHYSICIAN ASSISTANT

## 2022-06-30 PROCEDURE — 84450 TRANSFERASE (AST) (SGOT): CPT | Performed by: PHYSICIAN ASSISTANT

## 2022-06-30 PROCEDURE — 84460 ALANINE AMINO (ALT) (SGPT): CPT | Performed by: PHYSICIAN ASSISTANT

## 2022-06-30 PROCEDURE — 82565 ASSAY OF CREATININE: CPT | Performed by: PHYSICIAN ASSISTANT

## 2022-06-30 ASSESSMENT — PAIN SCALES - GENERAL: PAINLEVEL: NO PAIN (0)

## 2022-06-30 NOTE — PATIENT INSTRUCTIONS
After Visit Instructions:     Thank you for coming to [facility] for your care. It is my goal to partner with you to help you reach your optimal state of health.     Plan:     Schedule follow-up with Latoya Mohr PA-C in 3 months.   Labs: today and if normal then we'll switch to every 3 months  Medication recommendations:   Methotrexate: Will continue on 10mg (4 tablets) WEEKLY. While on Methotrexate:  -Limit alcohol to 2 drinks weekly  -Take Folic Acid 1mg daily   -Tylenol 500-1000mg can be used as needed up to three times daily for nausea/headache associated with dosing      Latoya Mohr PA-C  [facility]  [place] [facility]    Contact information: [facility]  Clinic Number:  [phone]  Please call or send a [employer] message with any questions about your care

## 2022-07-01 ENCOUNTER — TELEPHONE (OUTPATIENT)
Dept: RHEUMATOLOGY | Facility: CLINIC | Age: 82
End: 2022-07-01

## 2022-07-01 DIAGNOSIS — Z79.899 HIGH RISK MEDICATION USE: Primary | ICD-10-CM

## 2022-07-01 DIAGNOSIS — M05.79 RHEUMATOID ARTHRITIS INVOLVING MULTIPLE SITES WITH POSITIVE RHEUMATOID FACTOR (H): ICD-10-CM

## 2022-07-01 NOTE — TELEPHONE ENCOUNTER
Notified patient. She verbalized understanding. She has no further questions or concerns at this time.     Meg Morris RN on 7/1/2022 at 11:06 AM

## 2022-07-01 NOTE — TELEPHONE ENCOUNTER
Please let patient know that her monitoring labs were normal.  Okay to switch lab checks to every 3 months.    Latoya Mohr PA-C on 7/1/2022 at 11:01 AM

## 2022-07-22 NOTE — TELEPHONE ENCOUNTER
Routing refill request to provider for review/approval because:  Elevated BP on file.     Barbi Madden Rn   Authored by Resident/PA/NP

## 2022-08-24 NOTE — PROGRESS NOTES
Rheumatology Clinic Visit  [facility]  KAMALA Lovelaceramesh Lechugameghana MRN# [id number]   YOB: 1940 Age: 82 year old   Date of Visit: 08/24/2022  Primary care provider: Weston Raines          Assessment and Plan:     1.  Rheumatoid arthritis involving multiple sites  2. High risk medication use  3. Positive CCP antibodies    Patient presents today for follow-up regarding her seropositive rheumatoid arthritis.  She has been tolerating the methotrexate well.  She has noticed an improvement in her joints.  She still has some morning stiffness but states that it is not significant.  She has not noticed any pain.  Physical examination showed maybe some mild synovitis to the right third MCP.  There is no tenderness associated with this.  Full joint range of motion. Labs from 6/30/2022 showed a normal creatinine, albumin, and liver function tests.     Patient continues to have good control of her rheumatoid arthritis with methotrexate monotherapy.  We will plan to continue this.  We will check labs today and every 3 months to monitor for toxicity.  I did discuss with the patient that I will be leaving the [place] location.  Discussed options of following up with me in [place] versus transferring care to [place].  She would like to continue to follow-up with me in [place].  We will have her follow-up with me in 3 months and if things are stable we can push out the visits to every 6 months.    Plan:     1. Schedule follow-up with Latoya Mohr PA-C in 3 months.   2. Labs: today and every 3 months  3. Medication recommendations:   a. Methotrexate: Will continue on 10mg (4 tablets) WEEKLY. While on Methotrexate:  b. -Limit alcohol to 2 drinks weekly  c. -Take Folic Acid 1mg daily   d. -Tylenol 500-1000mg can be used as needed up to three times daily for nausea/headache associated with dosing    KAMALA Lovelace  Rheumatology         History of Present Illness:   Keyla Mari  "presents for evaluation of rheumatoid arthritis.      Interval history August 25, 2022:    She reports that her joints are doing well. She is tolerating methotrexate well. She has some morning stiffness, but it is not prohibitive. She continues to notice decreased strength.     Interval history 6/30/22  She has been taking the Methotrexate. She has been taking it on Sundays, she has been on it for about 2 months now. No side effects. She has been doing folic acid daily as well. Her joints have been doing good. She has not noticed any morning stiffness. She is able to make fists. No infections since her last visit.     HPI from consult 4/28/22:    She reports that about 1 month ago she started to have more pain in her hands, arms and shoulders. She states that it lasted for about 2 weeks. It then resolved. She states that she has not had it back again. She took Prednisone and states that the relief was almost immediate. She was then given Meloxicam but never started it. She has been off of the Prednsione for just over a week. At the time of her symptoms she felt worse in the morning. She states that it took a couple of hours to loosen up. She was unable to make fists and her hands were about \"twice the size\" and her watch was tight as well. She did not notice any pain in her feet or ankles. She states that she does have the start neuropathy in her feet. No skin rashes or mouth sores. No fevers. No significant fatigue.     No known family history of lupus or RA. No family or personal history of psoriasis, ulcerative colitis or crohn's.          Review of Systems:     Constitutional: negative  Skin: negative  Eyes: negative  Ears/Nose/Throat: negative  Respiratory: No shortness of breath, dyspnea on exertion, cough, or hemoptysis  Cardiovascular: negative  Gastrointestinal: negative  Genitourinary: negative  Musculoskeletal: As above  Neurologic: negative  Psychiatric: negative  Hematologic/Lymphatic/Immunologic: " negative  Endocrine: negative         Active Problem List:     Patient Active Problem List    Diagnosis Date Noted     Rheumatoid arthritis involving multiple sites with positive rheumatoid factor (H) 05/05/2022     Priority: Medium     Cystocele, midline 02/25/2019     Priority: Medium     Hypertension goal BP (blood pressure) < 150/90 08/07/2017     Priority: Medium     Cerebral infarction (H) 06/25/2013     Priority: Medium     Diagnosis updated by automated process. Provider to review and confirm.       Advanced directives, counseling/discussion 07/09/2012     Priority: Medium     Has a copy already here in clinic she states.  7/9/2012        Lumbar radiculopathy 02/09/2011     Priority: Medium     HYPERLIPIDEMIA LDL GOAL <100 10/31/2010     Priority: Medium     Gout 10/11/2010     Priority: Medium     Displacement of lumbar intervertebral disc without myelopathy 04/07/2008     Priority: Medium     Transient cerebral ischemia 01/24/2006     Priority: Medium     Problem list name updated by automated process. Provider to review              Past Medical History:     Past Medical History:   Diagnosis Date     Acute, but ill-defined, cerebrovascular disease      Basal cell carcinoma      Closed fracture of unspecified part of neck of femur     Hip fracture     CVA (cerebral infarction) 2005     Elevated blood pressure reading without diagnosis of hypertension      Hemorrhage of gastrointestinal tract, unspecified 08/13/2007    Transfer to [facility]     History of blood transfusion     after colonoscopy     HTN (hypertension)      Hyperlipidaemia      Other and unspecified hyperlipidemia      PONV (postoperative nausea and vomiting)      Unspecified cerebral artery occlusion with cerebral infarction      Past Surgical History:   Procedure Laterality Date     COLONOSCOPY  08/13/07     HC DILATION/CURETTAGE DIAG/THER NON OB  1984    D & C     LOBECTOMY LUNG Right 6/5/2015    Procedure: LOBECTOMY LUNG;  Surgeon:  Yariel Lund MD;  Location: [facility] OR     PHACOEMULSIFICATION WITH STANDARD INTRAOCULAR LENS IMPLANT Right 5/3/2018    Procedure: PHACOEMULSIFICATION WITH STANDARD INTRAOCULAR LENS IMPLANT;  PHACOEMULSIFICATION WITH STANDARD INTRAOCULAR LENS IMPLANT RIGHT;  Surgeon: Chevy Neves MD;  Location: [facility] OR     PHACOEMULSIFICATION WITH STANDARD INTRAOCULAR LENS IMPLANT Left 2018    Procedure: PHACOEMULSIFICATION WITH STANDARD INTRAOCULAR LENS IMPLANT;  PHACOEMULSIFICATION WITH STANDARD INTRAOCULAR LENS IMPLANT LEFT EYE;  Surgeon: Chevy Neves MD;  Location: [facility] OR     SURGICAL HISTORY OF -       Left leg fx--had pinning     THORACOTOMY Right 2015    Procedure: THORACOTOMY;  Surgeon: Yariel Lund MD;  Location: [facility] OR     ZZHC COLONOSCOPY W BIOPSY  07            Social History:     Social History     Socioeconomic History     Marital status:      Spouse name: Not on file     Number of children: Not on file     Years of education: Not on file     Highest education level: Not on file   Occupational History     Not on file   Tobacco Use     Smoking status: Former Smoker     Packs/day: 0.00     Quit date: 3/16/1985     Years since quittin.4     Smokeless tobacco: Never Used   Substance and Sexual Activity     Alcohol use: No     Alcohol/week: 0.0 standard drinks     Drug use: No     Sexual activity: Never   Other Topics Concern     Parent/sibling w/ CABG, MI or angioplasty before 65F 55M? Not Asked   Social History Narrative     Not on file     Social Determinants of Health     Financial Resource Strain: Not on file   Food Insecurity: Not on file   Transportation Needs: Not on file   Physical Activity: Not on file   Stress: Not on file   Social Connections: Not on file   Intimate Partner Violence: Not on file   Housing Stability: Not on file          Family History:     Family History   Problem Relation Age of Onset     Hypertension Father      Heart Disease Father          Fatal MI at 64     Diabetes Mother         Fatal complication of DM at 70     Hypertension Mother      Cancer Maternal Aunt         Stomach cancer x3     Gastrointestinal Disease Maternal Uncle         Bleeding ulcer - fatal     Diabetes Paternal Grandfather      Heart Disease Paternal Uncle         MI     Heart Disease Maternal Uncle         MI - fatal     Cancer Paternal Aunt         Breast     Circulatory Paternal Aunt         Aortic aneurysm     Breast Cancer Daughter 54            Allergies:     Allergies   Allergen Reactions     Indomethacin GI Disturbance            Medications:     Current Outpatient Medications   Medication Sig Dispense Refill     amLODIPine (NORVASC) 5 MG tablet TAKE ONE TABLET BY MOUTH ONCE DAILY (Patient taking differently: Take 5 mg by mouth daily) 90 tablet 3     aspirin 81 MG tablet Take 1 tablet (81 mg) by mouth daily       atenolol (TENORMIN) 100 MG tablet TAKE ONE TABLET BY MOUTH TWICE A  tablet 3     folic acid (FOLVITE) 1 MG tablet Take 1 tablet (1 mg) by mouth daily 90 tablet 1     losartan (COZAAR) 100 MG tablet TAKE ONE TABLET BY MOUTH ONCE DAILY 90 tablet 3     meloxicam (MOBIC) 15 MG tablet Take 1 tablet (15 mg) by mouth daily (Patient not taking: Reported on 6/30/2022) 31 tablet 1     methotrexate 2.5 MG tablet Take 4 tablets (10 mg) by mouth every 7 days Labs every 12 weeks. 55 tablet 0     simvastatin (ZOCOR) 20 MG tablet TAKE ONE TABLET BY MOUTH AT BEDTIME 90 tablet 2     STIMULANT LAXATIVE 8.6-50 MG tablet TAKE ONE TABLET BY MOUTH AT BEDTIME (Patient not taking: Reported on 6/30/2022) 90 tablet 1            Physical Exam:   not currently breastfeeding.  Wt Readings from Last 6 Encounters:   06/30/22 61.2 kg (135 lb)   05/05/22 60.8 kg (134 lb)   04/28/22 61.7 kg (136 lb)   04/05/22 61.7 kg (136 lb)   01/13/22 59.1 kg (130 lb 3.2 oz)   03/17/21 61.5 kg (135 lb 8 oz)     Constitutional: well-developed, appearing stated age; cooperative  Eyes: nl  conjunctiva, sclera  ENT: nl external ears  Resp: No shortness of breath with normal conversation  MS: Mild synovitis to the right third MCP.  No tenderness associated with this.  Full joint range of motion.  Slightly decreased  strength.  Skin: no nail pitting, alopecia, rash, nodules or lesions.   Psych: nl judgement, orientation, memory, affect.           Data:   Imaging:    X-ray bilateral hand 4/20/2022  IMPRESSION:  1.  Right hand: Moderate thumb CMC and third MCP degenerative  arthrosis. Scattered mild degenerative changes in the hand. No  fracture or periarticular erosion.  2.  Left hand: Moderate thumb CMC degenerative arthrosis. No fracture  or periarticular erosion.    Laboratory:  1/13/22  Creatinine 1.11, GFR 49  Calcium 9.0  Albumin 2.9  ALT 50  AST 58    4/5/22  CRP 7.1  Rheumatoid Factor 303  Sed Rate 12    4/28/2022  Creatinine 0.89, GFR 64  ALT 26, AST 16  CCP antibody 108  CBC within normal parameters  Hepatitis B surface antigen nonreactive  Hepatitis C antibody nonreactive    6/1/22  Creatinine 0.91, GFR 62  Albumin 3.8  ALT 22, AST 16

## 2022-08-25 ENCOUNTER — OFFICE VISIT (OUTPATIENT)
Dept: RHEUMATOLOGY | Facility: CLINIC | Age: 82
End: 2022-08-25
Payer: COMMERCIAL

## 2022-08-25 VITALS
WEIGHT: 135 LBS | DIASTOLIC BLOOD PRESSURE: 80 MMHG | SYSTOLIC BLOOD PRESSURE: 144 MMHG | HEIGHT: 63 IN | BODY MASS INDEX: 23.92 KG/M2

## 2022-08-25 DIAGNOSIS — Z79.899 HIGH RISK MEDICATION USE: Primary | ICD-10-CM

## 2022-08-25 DIAGNOSIS — M05.79 RHEUMATOID ARTHRITIS INVOLVING MULTIPLE SITES WITH POSITIVE RHEUMATOID FACTOR (H): ICD-10-CM

## 2022-08-25 DIAGNOSIS — R76.8 CYCLIC CITRULLINATED PEPTIDE (CCP) ANTIBODY POSITIVE: ICD-10-CM

## 2022-08-25 LAB
ALBUMIN SERPL-MCNC: 3.9 G/DL (ref 3.4–5)
ALT SERPL W P-5'-P-CCNC: 24 U/L (ref 0–50)
AST SERPL W P-5'-P-CCNC: 17 U/L (ref 0–45)
CREAT SERPL-MCNC: 0.89 MG/DL (ref 0.52–1.04)
ERYTHROCYTE [DISTWIDTH] IN BLOOD BY AUTOMATED COUNT: 14.4 % (ref 10–15)
GFR SERPL CREATININE-BSD FRML MDRD: 64 ML/MIN/1.73M2
HCT VFR BLD AUTO: 44 % (ref 35–47)
HGB BLD-MCNC: 14.6 G/DL (ref 11.7–15.7)
MCH RBC QN AUTO: 30.8 PG (ref 26.5–33)
MCHC RBC AUTO-ENTMCNC: 33.2 G/DL (ref 31.5–36.5)
MCV RBC AUTO: 93 FL (ref 78–100)
PLATELET # BLD AUTO: 175 10E3/UL (ref 150–450)
RBC # BLD AUTO: 4.74 10E6/UL (ref 3.8–5.2)
WBC # BLD AUTO: 8.4 10E3/UL (ref 4–11)

## 2022-08-25 PROCEDURE — 82040 ASSAY OF SERUM ALBUMIN: CPT | Performed by: PHYSICIAN ASSISTANT

## 2022-08-25 PROCEDURE — 85027 COMPLETE CBC AUTOMATED: CPT | Performed by: PHYSICIAN ASSISTANT

## 2022-08-25 PROCEDURE — 99214 OFFICE O/P EST MOD 30 MIN: CPT | Performed by: PHYSICIAN ASSISTANT

## 2022-08-25 PROCEDURE — 82565 ASSAY OF CREATININE: CPT | Performed by: PHYSICIAN ASSISTANT

## 2022-08-25 PROCEDURE — 36415 COLL VENOUS BLD VENIPUNCTURE: CPT | Performed by: PHYSICIAN ASSISTANT

## 2022-08-25 PROCEDURE — 84450 TRANSFERASE (AST) (SGOT): CPT | Performed by: PHYSICIAN ASSISTANT

## 2022-08-25 PROCEDURE — 84460 ALANINE AMINO (ALT) (SGPT): CPT | Performed by: PHYSICIAN ASSISTANT

## 2022-08-25 RX ORDER — FOLIC ACID 1 MG/1
1 TABLET ORAL DAILY
Qty: 90 TABLET | Refills: 1 | Status: SHIPPED | OUTPATIENT
Start: 2022-08-25 | End: 2023-01-31

## 2022-08-25 NOTE — PATIENT INSTRUCTIONS
After Visit Instructions:     Thank you for coming to [facility] for your care. It is my goal to partner with you to help you reach your optimal state of health.     Plan:     Schedule follow-up with Latoya Mohr PA-C in 3 months.   Labs: today and every 3 months  Medication recommendations:   Methotrexate: Will continue on 10mg (4 tablets) WEEKLY. While on Methotrexate:  -Limit alcohol to 2 drinks weekly  -Take Folic Acid 1mg daily   -Tylenol 500-1000mg can be used as needed up to three times daily for nausea/headache associated with dosing      Latoya Mohr PA-C  [facility]  [place] [facility]    Contact information: [facility]  Clinic Number:  [phone]  Please call or send a [employer] message with any questions about your care

## 2022-08-26 ENCOUNTER — TELEPHONE (OUTPATIENT)
Dept: RHEUMATOLOGY | Facility: CLINIC | Age: 82
End: 2022-08-26

## 2022-08-26 NOTE — TELEPHONE ENCOUNTER
Please call patient and let her know that her monitoring labs were normal.    Latoya Mohr PA-C on 8/26/2022 at 1:12 PM

## 2022-09-14 DIAGNOSIS — I10 HYPERTENSION GOAL BP (BLOOD PRESSURE) < 150/90: ICD-10-CM

## 2022-09-16 NOTE — TELEPHONE ENCOUNTER
"Requested Prescriptions   Pending Prescriptions Disp Refills    amLODIPine (NORVASC) 5 MG tablet [Pharmacy Med Name: AMLODIPINE 5MG TAB] 90 tablet 3     Sig: TAKE ONE TABLET BY MOUTH ONCE DAILY        Calcium Channel Blockers Protocol  Failed - 9/14/2022  1:07 AM        Failed - Blood pressure under 140/90 in past 12 months       BP Readings from Last 3 Encounters:   08/25/22 (!) 144/80   06/30/22 130/74   05/05/22 132/70                 Passed - Recent (12 mo) or future (30 days) visit within the authorizing provider's specialty     Patient has had an office visit with the authorizing provider or a provider within the authorizing providers department within the previous 12 mos or has a future within next 30 days. See \"Patient Info\" tab in inbasket, or \"Choose Columns\" in Meds & Orders section of the refill encounter.              Passed - Medication is active on med list        Passed - Patient is age 18 or older        Passed - No active pregnancy on record        Passed - Normal serum creatinine on file in past 12 months     Recent Labs   Lab Test 08/25/22  1121   CR 0.89       Ok to refill medication if creatinine is low          Passed - No positive pregnancy test in past 12 months                Tana Sweet, CAROLINAN, RN          "

## 2022-09-19 RX ORDER — AMLODIPINE BESYLATE 5 MG/1
5 TABLET ORAL DAILY
Qty: 90 TABLET | Refills: 3 | Status: SHIPPED | OUTPATIENT
Start: 2022-09-19 | End: 2023-08-29

## 2022-11-17 ENCOUNTER — HOSPITAL ENCOUNTER (OUTPATIENT)
Facility: CLINIC | Age: 82
Discharge: HOME | End: 2022-11-17
Attending: OPHTHALMOLOGY | Admitting: OPHTHALMOLOGY
Payer: COMMERCIAL

## 2022-11-17 PROCEDURE — 66821 AFTER CATARACT LASER SURGERY: CPT | Mod: LT | Performed by: OPHTHALMOLOGY

## 2022-11-17 PROCEDURE — 250N000009 HC RX 250: Performed by: OPHTHALMOLOGY

## 2022-11-17 RX ORDER — TROPICAMIDE 10 MG/ML
1 SOLUTION/ DROPS OPHTHALMIC ONCE
Status: COMPLETED | OUTPATIENT
Start: 2022-11-17 | End: 2022-11-17

## 2022-11-17 RX ADMIN — TROPICAMIDE 1 DROP: 10 SOLUTION/ DROPS OPHTHALMIC at 12:11

## 2022-11-17 NOTE — OP NOTE
[facility] Brief Operative Note    Pre-operative diagnosis: obscuring after cataracts   Post-operative diagnosis: Same   Procedure: Procedure(s):  LASER YAG CAPSULOTOMY   Surgeon: Shakeel Christianson   Assistant(s): None   Anesthesia: Topical   Estimated blood loss: None   Total IV fluids: None   Blood transfusion: No transfusion was given during surgery   Total urine output: Not measured   Drains: None   Specimens: None   Implants: None   Complications: None   Condition: Stable   Comments: Left operating area in good condition

## 2022-11-17 NOTE — OP NOTE
Procedure Date: 2022    PREOPERATIVE DIAGNOSIS:  Posterior capsule opacification, left eye.    INDICATIONS FOR SURGERY:  The patient has noted progressive decline in vision of her left eye secondary to opacification of the posterior capsule.  YAG laser capsulotomy indicated to open capsule and restore vision.    POSTOPERATIVE DIAGNOSIS:  Posterior capsule opacification, left eye.    PROCEDURE:  YAG laser capsulotomy, left eye.      SURGEON:  Shakeel Christianson Jr, MD    ANESTHESIA:  Topical.    DESCRIPTION OF PROCEDURE:  Informed consent was obtained preoperatively.  The risks and alternatives were reviewed.  The patient was taken to the YAG laser.  Her pupil had been dilated with Mydriacyl.  The patient was placed at the laser, where a central capsulotomy was created.  The patient tolerated the procedure well, and she left the operating room in good condition.    Shakeel Christianson Jr, MD        D: 2022   T: 2022   MT: [id number]    Name:     ONI COSTA  MRN:      [id number]        Account:        [id number]   :      1940           Procedure Date: 2022     Document: [id number]

## 2022-11-25 ENCOUNTER — LAB (OUTPATIENT)
Dept: LAB | Facility: CLINIC | Age: 82
End: 2022-11-25
Payer: COMMERCIAL

## 2022-11-25 DIAGNOSIS — Z79.899 HIGH RISK MEDICATION USE: ICD-10-CM

## 2022-11-25 DIAGNOSIS — M05.79 RHEUMATOID ARTHRITIS INVOLVING MULTIPLE SITES WITH POSITIVE RHEUMATOID FACTOR (H): ICD-10-CM

## 2022-11-25 LAB
ALBUMIN SERPL-MCNC: 3.8 G/DL (ref 3.4–5)
ALT SERPL W P-5'-P-CCNC: 25 U/L (ref 0–50)
AST SERPL W P-5'-P-CCNC: 14 U/L (ref 0–45)
CREAT SERPL-MCNC: 1 MG/DL (ref 0.52–1.04)
ERYTHROCYTE [DISTWIDTH] IN BLOOD BY AUTOMATED COUNT: 13.6 % (ref 10–15)
GFR SERPL CREATININE-BSD FRML MDRD: 55 ML/MIN/1.73M2
HCT VFR BLD AUTO: 42.2 % (ref 35–47)
HGB BLD-MCNC: 14.1 G/DL (ref 11.7–15.7)
MCH RBC QN AUTO: 30.7 PG (ref 26.5–33)
MCHC RBC AUTO-ENTMCNC: 33.4 G/DL (ref 31.5–36.5)
MCV RBC AUTO: 92 FL (ref 78–100)
PLATELET # BLD AUTO: 198 10E3/UL (ref 150–450)
RBC # BLD AUTO: 4.59 10E6/UL (ref 3.8–5.2)
WBC # BLD AUTO: 7.8 10E3/UL (ref 4–11)

## 2022-11-25 PROCEDURE — 82040 ASSAY OF SERUM ALBUMIN: CPT

## 2022-11-25 PROCEDURE — 85027 COMPLETE CBC AUTOMATED: CPT

## 2022-11-25 PROCEDURE — 84450 TRANSFERASE (AST) (SGOT): CPT

## 2022-11-25 PROCEDURE — 84460 ALANINE AMINO (ALT) (SGPT): CPT

## 2022-11-25 PROCEDURE — 82565 ASSAY OF CREATININE: CPT

## 2022-11-25 PROCEDURE — 36415 COLL VENOUS BLD VENIPUNCTURE: CPT

## 2022-12-28 DIAGNOSIS — E78.5 HYPERLIPIDEMIA LDL GOAL <130: ICD-10-CM

## 2022-12-29 RX ORDER — SIMVASTATIN 20 MG
TABLET ORAL
Qty: 90 TABLET | Refills: 2 | Status: SHIPPED | OUTPATIENT
Start: 2022-12-29 | End: 2023-08-29

## 2022-12-29 NOTE — TELEPHONE ENCOUNTER
"Requested Prescriptions   Pending Prescriptions Disp Refills    simvastatin (ZOCOR) 20 MG tablet [Pharmacy Med Name: SIMVASTATIN 20MG TABS] 90 tablet 2     Sig: TAKE ONE TABLET BY MOUTH AT BEDTIME       Statins Protocol Failed - 12/28/2022  5:08 AM        Failed - LDL on file in past 12 months     Recent Labs   Lab Test 04/22/21  0828   LDL 70             Passed - No abnormal creatine kinase in past 12 months     No lab results found.             Passed - Recent (12 mo) or future (30 days) visit within the authorizing provider's specialty     Patient has had an office visit with the authorizing provider or a provider within the authorizing providers department within the previous 12 mos or has a future within next 30 days. See \"Patient Info\" tab in inbasket, or \"Choose Columns\" in Meds & Orders section of the refill encounter.              Passed - Medication is active on med list        Passed - Patient is age 18 or older        Passed - No active pregnancy on record        Passed - No positive pregnancy test in past 12 months             "

## 2023-01-31 ENCOUNTER — OFFICE VISIT (OUTPATIENT)
Dept: RHEUMATOLOGY | Facility: CLINIC | Age: 83
End: 2023-01-31
Payer: COMMERCIAL

## 2023-01-31 VITALS
SYSTOLIC BLOOD PRESSURE: 126 MMHG | HEIGHT: 63 IN | BODY MASS INDEX: 23.21 KG/M2 | DIASTOLIC BLOOD PRESSURE: 80 MMHG | WEIGHT: 131 LBS

## 2023-01-31 DIAGNOSIS — M05.79 RHEUMATOID ARTHRITIS INVOLVING MULTIPLE SITES WITH POSITIVE RHEUMATOID FACTOR (H): ICD-10-CM

## 2023-01-31 PROCEDURE — 99214 OFFICE O/P EST MOD 30 MIN: CPT | Performed by: PHYSICIAN ASSISTANT

## 2023-01-31 RX ORDER — FOLIC ACID 1 MG/1
1 TABLET ORAL DAILY
Qty: 90 TABLET | Refills: 1 | Status: SHIPPED | OUTPATIENT
Start: 2023-01-31 | End: 2023-08-09

## 2023-01-31 NOTE — PATIENT INSTRUCTIONS
After Visit Instructions:     Thank you for coming to [facility] for your care. It is my goal to partner with you to help you reach your optimal state of health.       Plan:     Schedule follow-up with Latoya Mohr PA-C in 6 months.   Labs: CBC, creatinine, albumin, AST, ALT every 3 months  Medication recommendations:   Continue Methotrexate 10mg (tablets)weekly  Continue Folic Acid Daily      Latoya Mohr PA-C  [facility]  [facility]    Contact information: [facility]  Clinic Number:  [phone]  Please call or send a [employer] message with any questions about your care

## 2023-01-31 NOTE — PROGRESS NOTES
Rheumatology Clinic Visit  [facility]  KAMALA Lovelace     Keyla Mari MRN# [id number]   YOB: 1940 Age: 82 year old   Date of Visit: 01/31/2023  Primary care provider: Weston Raines          Assessment and Plan:     1.  Rheumatoid arthritis involving multiple sites  2. High risk medication use  3. Positive CCP antibodies      Patient presents today for follow-up regarding her seropositive rheumatoid arthritis.  She reports that she has been doing well.  She has been tolerating the methotrexate well and feels that her joints have been doing good.  She has not noticed any swelling.  Physical examination does not show any active synovitis.  Previous laboratory evaluations were reviewed, results below.    Patient's rheumatoid arthritis continues to remain under good control.  She continues to tolerate the methotrexate well and labs have been stable.  We will continue the current plan of methotrexate 10 mg weekly with folic acid daily.  Labs every 3 months.    Plan:     1. Schedule follow-up with Latoya Mohr PA-C in 6 months.   2. Labs: CBC, creatinine, albumin, AST, ALT every 3 months  3. Medication recommendations:   a. Continue Methotrexate 10mg (4 tablets)weekly  b. Continue Folic Acid Daily    KAMALA Lovelace  Rheumatology         History of Present Illness:   Keyla Mari presents for evaluation of rheumatoid arthritis.      Interval history January 31, 2023:  Patient states that she is doing well.  She does have some morning stiffness but it is stable.  She does have a decreased  strength but feels that this is also stable.  She is tolerating the methotrexate well.  She does not feel she needs changes at this time.    Interval history August 25, 2022:  She reports that her joints are doing well. She is tolerating methotrexate well. She has some morning stiffness, but it is not prohibitive. She continues to notice decreased strength.     Interval history  "6/30/22  She has been taking the Methotrexate. She has been taking it on Sundays, she has been on it for about 2 months now. No side effects. She has been doing folic acid daily as well. Her joints have been doing good. She has not noticed any morning stiffness. She is able to make fists. No infections since her last visit.     HPI from consult 4/28/22:  She reports that about 1 month ago she started to have more pain in her hands, arms and shoulders. She states that it lasted for about 2 weeks. It then resolved. She states that she has not had it back again. She took Prednisone and states that the relief was almost immediate. She was then given Meloxicam but never started it. She has been off of the Prednsione for just over a week. At the time of her symptoms she felt worse in the morning. She states that it took a couple of hours to loosen up. She was unable to make fists and her hands were about \"twice the size\" and her watch was tight as well. She did not notice any pain in her feet or ankles. She states that she does have the start neuropathy in her feet. No skin rashes or mouth sores. No fevers. No significant fatigue.     No known family history of lupus or RA. No family or personal history of psoriasis, ulcerative colitis or crohn's.          Review of Systems:     Constitutional: negative  Skin: negative  Eyes: negative  Ears/Nose/Throat: negative  Respiratory: No shortness of breath, dyspnea on exertion, cough, or hemoptysis  Cardiovascular: negative  Gastrointestinal: negative  Genitourinary: negative  Musculoskeletal: As above  Neurologic: negative  Psychiatric: negative  Hematologic/Lymphatic/Immunologic: negative  Endocrine: negative         Active Problem List:     Patient Active Problem List    Diagnosis Date Noted     Rheumatoid arthritis involving multiple sites with positive rheumatoid factor (H) 05/05/2022     Priority: Medium     Cystocele, midline 02/25/2019     Priority: Medium     " Hypertension goal BP (blood pressure) < 150/90 08/07/2017     Priority: Medium     Cerebral infarction (H) 06/25/2013     Priority: Medium     Diagnosis updated by automated process. Provider to review and confirm.       Advanced directives, counseling/discussion 07/09/2012     Priority: Medium     Has a copy already here in clinic she states.  7/9/2012        Lumbar radiculopathy 02/09/2011     Priority: Medium     HYPERLIPIDEMIA LDL GOAL <100 10/31/2010     Priority: Medium     Gout 10/11/2010     Priority: Medium     Displacement of lumbar intervertebral disc without myelopathy 04/07/2008     Priority: Medium     Transient cerebral ischemia 01/24/2006     Priority: Medium     Problem list name updated by automated process. Provider to review              Past Medical History:     Past Medical History:   Diagnosis Date     Acute, but ill-defined, cerebrovascular disease      Basal cell carcinoma      Closed fracture of unspecified part of neck of femur     Hip fracture     CVA (cerebral infarction) 2005     Elevated blood pressure reading without diagnosis of hypertension      Hemorrhage of gastrointestinal tract, unspecified 08/13/2007    Transfer to [facility]     History of blood transfusion     after colonoscopy     HTN (hypertension)      Hyperlipidaemia      Other and unspecified hyperlipidemia      PONV (postoperative nausea and vomiting)      Unspecified cerebral artery occlusion with cerebral infarction      Past Surgical History:   Procedure Laterality Date     COLONOSCOPY  08/13/07     HC DILATION/CURETTAGE DIAG/THER NON OB  1984    D & C     LASER YAG CAPSULOTOMY Left 11/17/2022    Procedure: CAPSULOTOMY, LENS, USING YAG LASER;  Surgeon: Shakeel Christianson MD;  Location: [facility] OR     LOBECTOMY LUNG Right 6/5/2015    Procedure: LOBECTOMY LUNG;  Surgeon: Yariel Lund MD;  Location: [facility] OR     PHACOEMULSIFICATION WITH STANDARD INTRAOCULAR LENS IMPLANT Right 5/3/2018    Procedure:  PHACOEMULSIFICATION WITH STANDARD INTRAOCULAR LENS IMPLANT;  PHACOEMULSIFICATION WITH STANDARD INTRAOCULAR LENS IMPLANT RIGHT;  Surgeon: Chevy Neves MD;  Location: [facility] OR     PHACOEMULSIFICATION WITH STANDARD INTRAOCULAR LENS IMPLANT Left 2018    Procedure: PHACOEMULSIFICATION WITH STANDARD INTRAOCULAR LENS IMPLANT;  PHACOEMULSIFICATION WITH STANDARD INTRAOCULAR LENS IMPLANT LEFT EYE;  Surgeon: Chevy Neves MD;  Location: [facility] OR     SURGICAL HISTORY OF -       Left leg fx--had pinning     THORACOTOMY Right 2015    Procedure: THORACOTOMY;  Surgeon: Yariel Lund MD;  Location: [facility] OR     ZZHC COLONOSCOPY W BIOPSY  07            Social History:     Social History     Socioeconomic History     Marital status:      Spouse name: Not on file     Number of children: Not on file     Years of education: Not on file     Highest education level: Not on file   Occupational History     Not on file   Tobacco Use     Smoking status: Former     Packs/day: 0.00     Types: Cigarettes     Quit date: 3/16/1985     Years since quittin.9     Smokeless tobacco: Never   Substance and Sexual Activity     Alcohol use: No     Alcohol/week: 0.0 standard drinks     Drug use: No     Sexual activity: Never   Other Topics Concern     Parent/sibling w/ CABG, MI or angioplasty before 65F 55M? Not Asked   Social History Narrative     Not on file     Social Determinants of Health     Financial Resource Strain: Not on file   Food Insecurity: Not on file   Transportation Needs: Not on file   Physical Activity: Not on file   Stress: Not on file   Social Connections: Not on file   Intimate Partner Violence: Not on file   Housing Stability: Not on file          Family History:     Family History   Problem Relation Age of Onset     Hypertension Father      Heart Disease Father         Fatal MI at 64     Diabetes Mother         Fatal complication of DM at 70     Hypertension Mother      Cancer Maternal  Aunt         Stomach cancer x3     Gastrointestinal Disease Maternal Uncle         Bleeding ulcer - fatal     Diabetes Paternal Grandfather      Heart Disease Paternal Uncle         MI     Heart Disease Maternal Uncle         MI - fatal     Cancer Paternal Aunt         Breast     Circulatory Paternal Aunt         Aortic aneurysm     Breast Cancer Daughter 54            Allergies:     Allergies   Allergen Reactions     Indomethacin GI Disturbance            Medications:     Current Outpatient Medications   Medication Sig Dispense Refill     amLODIPine (NORVASC) 5 MG tablet Take 1 tablet (5 mg) by mouth daily 90 tablet 3     aspirin 81 MG tablet Take 1 tablet (81 mg) by mouth daily       atenolol (TENORMIN) 100 MG tablet TAKE ONE TABLET BY MOUTH TWICE A  tablet 3     folic acid (FOLVITE) 1 MG tablet Take 1 tablet (1 mg) by mouth daily 90 tablet 1     losartan (COZAAR) 100 MG tablet TAKE ONE TABLET BY MOUTH ONCE DAILY 90 tablet 3     methotrexate 2.5 MG tablet Take 4 tablets (10 mg) by mouth every 7 days Labs every 12 weeks. 55 tablet 1     simvastatin (ZOCOR) 20 MG tablet TAKE ONE TABLET BY MOUTH AT BEDTIME 90 tablet 2            Physical Exam:   not currently breastfeeding.  Wt Readings from Last 6 Encounters:   08/25/22 61.2 kg (135 lb)   06/30/22 61.2 kg (135 lb)   05/05/22 60.8 kg (134 lb)   04/28/22 61.7 kg (136 lb)   04/05/22 61.7 kg (136 lb)   01/13/22 59.1 kg (130 lb 3.2 oz)     Constitutional: well-developed, appearing stated age; cooperative  Eyes: nl conjunctiva, sclera  ENT: nl external ears  Resp: No shortness of breath with normal conversation  MS: No active synovitis.   Skin: no nail pitting, alopecia, rash, nodules or lesions.   Psych: nl judgement, orientation, memory, affect.           Data:   Imaging:    X-ray bilateral hand 4/20/2022  IMPRESSION:  1.  Right hand: Moderate thumb CMC and third MCP degenerative  arthrosis. Scattered mild degenerative changes in the hand. No  fracture or  periarticular erosion.  2.  Left hand: Moderate thumb CMC degenerative arthrosis. No fracture  or periarticular erosion.    Laboratory:  1/13/22  Creatinine 1.11, GFR 49  Calcium 9.0  Albumin 2.9  ALT 50  AST 58    4/5/22  CRP 7.1  Rheumatoid Factor 303  Sed Rate 12    4/28/2022  Creatinine 0.89, GFR 64  ALT 26, AST 16  CCP antibody 108  CBC within normal parameters  Hepatitis B surface antigen nonreactive  Hepatitis C antibody nonreactive    6/1/22  Creatinine 0.91, GFR 62  Albumin 3.8  ALT 22, AST 16    11/25/2022  Creatinine 1.0, GFR 55  Albumin 3.8  ALT 25, AST 14  CBC within normal limits

## 2023-02-15 NOTE — TELEPHONE ENCOUNTER
"Requested Prescriptions   Pending Prescriptions Disp Refills     amLODIPine (NORVASC) 5 MG tablet [Pharmacy Med Name: AMLODIPINE BESYLATE 5MG TABS] 45 tablet 2     Sig: TAKE ONE-HALF TABLET BY MOUTH EVERY DAY    Calcium Channel Blockers Protocol  Failed    2/5/2018  1:23 PM       Failed - Blood pressure under 140/90    BP Readings from Last 3 Encounters:   11/03/17 180/86   10/19/17 162/78   10/09/17 (!) 170/93                Passed - Recent or future visit with authorizing provider    Patient had office visit in the last year or has a visit in the next 30 days with authorizing provider.  See \"Patient Info\" tab in inbasket, or \"Choose Columns\" in Meds & Orders section of the refill encounter.            Passed - Patient is age 18 or older       Passed - No active pregnancy on record       Passed - Normal serum creatinine on file in past 12 months    Recent Labs   Lab Test  10/09/17   1030   CR  0.93            Passed - No positive pregnancy test in past 12 months          Last Written Prescription Date:  10/9/17  Last Fill Quantity: 30,  # refills: 0   Last Office Visit with [facility], [facility] or [facility] prescribing provider:  10/19/17   Future Office Visit:       " Patient ambulatory to department for every 4 week Xolair injection. AVS printed and reviewed. Given 300 mg in two syringes subcutaneously, 150 mg given in each arm. Tolerated injections well. Pt monitored 30 minutes post injection. Patient discharged per ambulatory. Scheduled to return in 4 weeks.

## 2023-02-23 DIAGNOSIS — I10 HYPERTENSION GOAL BP (BLOOD PRESSURE) < 150/90: ICD-10-CM

## 2023-02-23 RX ORDER — LOSARTAN POTASSIUM 100 MG/1
TABLET ORAL
Qty: 90 TABLET | Refills: 0 | Status: SHIPPED | OUTPATIENT
Start: 2023-02-23 | End: 2023-05-25

## 2023-02-23 NOTE — TELEPHONE ENCOUNTER
Prescription approved per [facility] Refill Protocol.  Rosalva Patel RN on 2/23/2023 at 12:48 PM

## 2023-04-05 DIAGNOSIS — I10 HYPERTENSION GOAL BP (BLOOD PRESSURE) < 150/90: ICD-10-CM

## 2023-04-05 RX ORDER — ATENOLOL 100 MG/1
TABLET ORAL
Qty: 180 TABLET | Refills: 0 | Status: SHIPPED | OUTPATIENT
Start: 2023-04-05 | End: 2023-06-29

## 2023-04-05 NOTE — TELEPHONE ENCOUNTER
Prescription approved per [facility] Refill Protocol.  Rosalva Patel RN on 4/5/2023 at 1:58 PM

## 2023-04-14 ENCOUNTER — LAB (OUTPATIENT)
Dept: LAB | Facility: CLINIC | Age: 83
End: 2023-04-14
Payer: COMMERCIAL

## 2023-04-14 DIAGNOSIS — M05.79 RHEUMATOID ARTHRITIS INVOLVING MULTIPLE SITES WITH POSITIVE RHEUMATOID FACTOR (H): ICD-10-CM

## 2023-04-14 DIAGNOSIS — Z79.899 HIGH RISK MEDICATION USE: ICD-10-CM

## 2023-04-14 LAB
ALBUMIN SERPL BCG-MCNC: 4.2 G/DL (ref 3.5–5.2)
ALT SERPL W P-5'-P-CCNC: 21 U/L (ref 10–35)
AST SERPL W P-5'-P-CCNC: 21 U/L (ref 10–35)
CREAT SERPL-MCNC: 0.94 MG/DL (ref 0.51–0.95)
ERYTHROCYTE [DISTWIDTH] IN BLOOD BY AUTOMATED COUNT: 14 % (ref 10–15)
GFR SERPL CREATININE-BSD FRML MDRD: 60 ML/MIN/1.73M2
HCT VFR BLD AUTO: 41.3 % (ref 35–47)
HGB BLD-MCNC: 13.6 G/DL (ref 11.7–15.7)
MCH RBC QN AUTO: 31.4 PG (ref 26.5–33)
MCHC RBC AUTO-ENTMCNC: 32.9 G/DL (ref 31.5–36.5)
MCV RBC AUTO: 95 FL (ref 78–100)
PLATELET # BLD AUTO: 188 10E3/UL (ref 150–450)
RBC # BLD AUTO: 4.33 10E6/UL (ref 3.8–5.2)
WBC # BLD AUTO: 8.5 10E3/UL (ref 4–11)

## 2023-04-14 PROCEDURE — 84460 ALANINE AMINO (ALT) (SGPT): CPT

## 2023-04-14 PROCEDURE — 84450 TRANSFERASE (AST) (SGOT): CPT

## 2023-04-14 PROCEDURE — 85027 COMPLETE CBC AUTOMATED: CPT

## 2023-04-14 PROCEDURE — 82040 ASSAY OF SERUM ALBUMIN: CPT

## 2023-04-14 PROCEDURE — 82565 ASSAY OF CREATININE: CPT

## 2023-04-14 PROCEDURE — 36415 COLL VENOUS BLD VENIPUNCTURE: CPT

## 2023-05-24 DIAGNOSIS — I10 HYPERTENSION GOAL BP (BLOOD PRESSURE) < 150/90: ICD-10-CM

## 2023-05-24 NOTE — LETTER
June 1, 2023      Keyla Mari  [address]  [place] [address]        Dear Keyla,     We are concerned about your health care.  We recently provided you with a medication refill.  Many medications require routine follow-up with your Doctor.      At this time we ask that: You schedule an appointment for your annual physical. Call the clinic at [phone] Option 1 to schedule.     Your prescription:  Medication is being filled for 1 time shona refill 90 day supply      Thank you,       Care Team per Weston Raines MD, MD

## 2023-05-25 RX ORDER — LOSARTAN POTASSIUM 100 MG/1
TABLET ORAL
Qty: 90 TABLET | Refills: 0 | Status: SHIPPED | OUTPATIENT
Start: 2023-05-25 | End: 2023-08-24

## 2023-05-25 NOTE — TELEPHONE ENCOUNTER
Pending Prescriptions:                       Disp   Refills    losartan (COZAAR) 100 MG tablet [Pharmacy*90 tab*0            Sig: TAKE ONE TABLET BY MOUTH ONCE DAILY    Medication is being filled for 1 time shona refill only due to:  Patient is due for yearly visit    Please call and help schedule.  Thank you!    Rosalva Patel RN on 5/25/2023 at 1:23 PM

## 2023-06-28 ENCOUNTER — TELEPHONE (OUTPATIENT)
Dept: FAMILY MEDICINE | Facility: CLINIC | Age: 83
End: 2023-06-28
Payer: COMMERCIAL

## 2023-06-28 NOTE — TELEPHONE ENCOUNTER
Patient is calling regarding her medications -    She is having some skin cancer lesions removed on July 25th and August 1st.    She is needing direction in regards to her aspirin and stopping it prior to her procedure.    She said that they PREFER that she not be on the medication for the procedure.    Please call patient with instructions: [phone]    Janet BURNSO/

## 2023-06-29 DIAGNOSIS — I10 HYPERTENSION GOAL BP (BLOOD PRESSURE) < 150/90: ICD-10-CM

## 2023-06-29 RX ORDER — ATENOLOL 100 MG/1
TABLET ORAL
Qty: 180 TABLET | Refills: 0 | Status: SHIPPED | OUTPATIENT
Start: 2023-06-29 | End: 2023-08-29

## 2023-06-29 NOTE — TELEPHONE ENCOUNTER
Jannet webster, patient has an appointment scheduled.  Rosalva Patel RN on 6/29/2023 at 12:45 PM

## 2023-08-09 DIAGNOSIS — M05.79 RHEUMATOID ARTHRITIS INVOLVING MULTIPLE SITES WITH POSITIVE RHEUMATOID FACTOR (H): ICD-10-CM

## 2023-08-09 RX ORDER — FOLIC ACID 1 MG/1
1 TABLET ORAL DAILY
Qty: 90 TABLET | Refills: 0 | Status: SHIPPED | OUTPATIENT
Start: 2023-08-09 | End: 2023-08-29

## 2023-08-09 NOTE — LETTER
Keyla Mari  [address] [place] [facility] [place] [address]        August 9, 2023      Dear Keyla Mari,      We received a request to refill folic acid.  This medication request has been approved for a 3 month supply.  Many medications require regular follow up visits.     Our records indicate that you are due for a follow up with Latoya Mohr in Rheumatology. Please contact our office at [phone], to schedule this appointment at your earliest convenience.        Sincerely,      Your [facility] Rheumatology care team

## 2023-08-09 NOTE — TELEPHONE ENCOUNTER
Approved 90 day supply. Mailed letter notifying patient to make Rheumatology follow up visit.    Karlie Brannon RN on 8/9/2023 at 2:54 PM

## 2023-08-09 NOTE — TELEPHONE ENCOUNTER
LF: 5/5/23  Qty: 90  Last Rheumatology Visit: 1/31/23   Next Scheduled Rheumatology Visit: No scheduled

## 2023-08-24 DIAGNOSIS — I10 HYPERTENSION GOAL BP (BLOOD PRESSURE) < 150/90: ICD-10-CM

## 2023-08-24 RX ORDER — LOSARTAN POTASSIUM 100 MG/1
TABLET ORAL
Qty: 90 TABLET | Refills: 0 | Status: SHIPPED | OUTPATIENT
Start: 2023-08-24 | End: 2023-08-29

## 2023-08-29 ENCOUNTER — OFFICE VISIT (OUTPATIENT)
Dept: FAMILY MEDICINE | Facility: CLINIC | Age: 83
End: 2023-08-29
Payer: COMMERCIAL

## 2023-08-29 ENCOUNTER — TELEPHONE (OUTPATIENT)
Dept: FAMILY MEDICINE | Facility: CLINIC | Age: 83
End: 2023-08-29

## 2023-08-29 VITALS
BODY MASS INDEX: 23.61 KG/M2 | TEMPERATURE: 97.6 F | RESPIRATION RATE: 18 BRPM | WEIGHT: 133.25 LBS | DIASTOLIC BLOOD PRESSURE: 78 MMHG | HEART RATE: 50 BPM | HEIGHT: 63 IN | OXYGEN SATURATION: 98 % | SYSTOLIC BLOOD PRESSURE: 142 MMHG

## 2023-08-29 DIAGNOSIS — Z00.00 ENCOUNTER FOR MEDICARE ANNUAL WELLNESS EXAM: Primary | ICD-10-CM

## 2023-08-29 DIAGNOSIS — E78.5 HYPERLIPIDEMIA LDL GOAL <130: ICD-10-CM

## 2023-08-29 DIAGNOSIS — I10 HYPERTENSION GOAL BP (BLOOD PRESSURE) < 150/90: ICD-10-CM

## 2023-08-29 DIAGNOSIS — M05.79 RHEUMATOID ARTHRITIS INVOLVING MULTIPLE SITES WITH POSITIVE RHEUMATOID FACTOR (H): ICD-10-CM

## 2023-08-29 DIAGNOSIS — R74.8 ELEVATED CREATINE KINASE: ICD-10-CM

## 2023-08-29 LAB
ALBUMIN SERPL BCG-MCNC: 4.6 G/DL (ref 3.5–5.2)
ALP SERPL-CCNC: 107 U/L (ref 35–104)
ALT SERPL W P-5'-P-CCNC: 21 U/L (ref 0–50)
ANION GAP SERPL CALCULATED.3IONS-SCNC: 13 MMOL/L (ref 7–15)
AST SERPL W P-5'-P-CCNC: 24 U/L (ref 0–45)
BILIRUB SERPL-MCNC: 1 MG/DL
BUN SERPL-MCNC: 14.1 MG/DL (ref 8–23)
CALCIUM SERPL-MCNC: 9.8 MG/DL (ref 8.8–10.2)
CHLORIDE SERPL-SCNC: 104 MMOL/L (ref 98–107)
CHOLEST SERPL-MCNC: 143 MG/DL
CREAT SERPL-MCNC: 1 MG/DL (ref 0.51–0.95)
CREAT UR-MCNC: 108.9 MG/DL
DEPRECATED HCO3 PLAS-SCNC: 24 MMOL/L (ref 22–29)
GFR SERPL CREATININE-BSD FRML MDRD: 55 ML/MIN/1.73M2
GLUCOSE SERPL-MCNC: 121 MG/DL (ref 70–99)
HDLC SERPL-MCNC: 62 MG/DL
LDLC SERPL CALC-MCNC: 65 MG/DL
MICROALBUMIN UR-MCNC: 20.8 MG/L
MICROALBUMIN/CREAT UR: 19.1 MG/G CR (ref 0–25)
NONHDLC SERPL-MCNC: 81 MG/DL
POTASSIUM SERPL-SCNC: 4.1 MMOL/L (ref 3.4–5.3)
PROT SERPL-MCNC: 7.2 G/DL (ref 6.4–8.3)
SODIUM SERPL-SCNC: 141 MMOL/L (ref 136–145)
TRIGL SERPL-MCNC: 82 MG/DL

## 2023-08-29 PROCEDURE — 36415 COLL VENOUS BLD VENIPUNCTURE: CPT | Performed by: FAMILY MEDICINE

## 2023-08-29 PROCEDURE — 82570 ASSAY OF URINE CREATININE: CPT | Performed by: FAMILY MEDICINE

## 2023-08-29 PROCEDURE — G0439 PPPS, SUBSEQ VISIT: HCPCS | Performed by: FAMILY MEDICINE

## 2023-08-29 PROCEDURE — 80061 LIPID PANEL: CPT | Performed by: FAMILY MEDICINE

## 2023-08-29 PROCEDURE — 80053 COMPREHEN METABOLIC PANEL: CPT | Performed by: FAMILY MEDICINE

## 2023-08-29 PROCEDURE — 82043 UR ALBUMIN QUANTITATIVE: CPT | Performed by: FAMILY MEDICINE

## 2023-08-29 RX ORDER — AMLODIPINE BESYLATE 5 MG/1
5 TABLET ORAL DAILY
Qty: 90 TABLET | Refills: 3 | Status: SHIPPED | OUTPATIENT
Start: 2023-08-29 | End: 2024-05-21

## 2023-08-29 RX ORDER — FOLIC ACID 1 MG/1
1 TABLET ORAL DAILY
Qty: 90 TABLET | Refills: 3 | Status: SHIPPED | OUTPATIENT
Start: 2023-08-29 | End: 2024-05-21

## 2023-08-29 RX ORDER — ATENOLOL 100 MG/1
100 TABLET ORAL 2 TIMES DAILY
Qty: 180 TABLET | Refills: 3 | Status: SHIPPED | OUTPATIENT
Start: 2023-08-29 | End: 2024-05-21

## 2023-08-29 RX ORDER — SIMVASTATIN 20 MG
20 TABLET ORAL AT BEDTIME
Qty: 90 TABLET | Refills: 3 | Status: SHIPPED | OUTPATIENT
Start: 2023-08-29 | End: 2024-05-21

## 2023-08-29 RX ORDER — LOSARTAN POTASSIUM 100 MG/1
100 TABLET ORAL DAILY
Qty: 90 TABLET | Refills: 3 | Status: SHIPPED | OUTPATIENT
Start: 2023-08-29 | End: 2024-04-03

## 2023-08-29 ASSESSMENT — ENCOUNTER SYMPTOMS
EYE PAIN: 0
CHILLS: 0
HEARTBURN: 0
BREAST MASS: 0
PARESTHESIAS: 0
ARTHRALGIAS: 0
WEAKNESS: 0
MYALGIAS: 0
SHORTNESS OF BREATH: 0
DYSURIA: 0
DIZZINESS: 0
FREQUENCY: 0
COUGH: 0
SORE THROAT: 0
ABDOMINAL PAIN: 0
PALPITATIONS: 0
HEMATOCHEZIA: 0
NERVOUS/ANXIOUS: 0
HEADACHES: 0
CONSTIPATION: 0
FEVER: 0
JOINT SWELLING: 0
NAUSEA: 0
DIARRHEA: 0
HEMATURIA: 0

## 2023-08-29 ASSESSMENT — ACTIVITIES OF DAILY LIVING (ADL): CURRENT_FUNCTION: NO ASSISTANCE NEEDED

## 2023-08-29 NOTE — PROGRESS NOTES
"SUBJECTIVE:   Keyla is a 83 year old who presents for Preventive Visit.      8/29/2023     8:42 AM   Additional Questions   Roomed by Karen BAH MA       Are you in the first 12 months of your Medicare coverage?  No    Healthy Habits:     In general, how would you rate your overall health?  Good    Frequency of exercise:  None    Do you usually eat at least 4 servings of fruit and vegetables a day, include whole grains    & fiber and avoid regularly eating high fat or \"junk\" foods?  Yes    Taking medications regularly:  Yes    Medication side effects:  Not applicable    Ability to successfully perform activities of daily living:  No assistance needed    Home Safety:  No safety concerns identified    Hearing Impairment:  No hearing concerns    In the past 6 months, have you been bothered by leaking of urine?  No    In general, how would you rate your overall mental or emotional health?  Good    Additional concerns today:  No        Have you ever done Advance Care Planning? (For example, a Health Directive, POLST, or a discussion with a medical provider or your loved ones about your wishes): Yes, patient states has an Advance Care Planning document and will bring a copy to the clinic.       Fall risk  Fallen 2 or more times in the past year?: No  Any fall with injury in the past year?: No    Cognitive Screening   1) Repeat 3 items (Leader, Season, Table)    2) Clock draw: NORMAL  3) 3 item recall: Recalls 2 objects   Results: NORMAL clock, 1-2 items recalled: COGNITIVE IMPAIRMENT LESS LIKELY    Mini-CogTM Copyright YESENIA Griggs. Licensed by the author for use in [facility]; reprinted with permission (madhav@.[email or web address]). All rights reserved.          Reviewed and updated as needed this visit by clinical staff   Tobacco  Allergies  Meds              Reviewed and updated as needed this visit by Provider                 Social History     Tobacco Use     Smoking status: Former     Packs/day: 0.00     Types: " Cigarettes     Quit date: 3/16/1985     Years since quittin.4     Smokeless tobacco: Never   Substance Use Topics     Alcohol use: No     Alcohol/week: 0.0 standard drinks of alcohol             2023     8:38 AM   Alcohol Use   Prescreen: >3 drinks/day or >7 drinks/week? Not Applicable     Do you have a current opioid prescription? No  Do you use any other controlled substances or medications that are not prescribed by a provider? None              Current providers sharing in care for this patient include:   Patient Care Team:  Weston Raines MD as PCP - General  Weston Raines MD as Assigned PCP  Latoya Mohr PA-C as Assigned Rheumatology Provider  Diego Coon MD as MD (Dermatology)    The following health maintenance items are reviewed in Epic and correct as of today:  Health Maintenance   Topic Date Due     ANNUAL REVIEW OF  ORDERS  Never done     ZOSTER IMMUNIZATION (1 of 2) Never done     DTAP/TDAP/TD IMMUNIZATION (1 - Tdap) 2009     MEDICARE ANNUAL WELLNESS VISIT  10/09/2020     COVID-19 Vaccine (4 - Booster for Moderna series) 2022     LIPID  2022     MICROALBUMIN  2022     ADVANCE CARE PLANNING  2022     BMP  2023     INFLUENZA VACCINE (1) 2023     CREATININE  2024     HEMOGLOBIN  2024     FALL RISK ASSESSMENT  2024     PHQ-2 (once per calendar year)  Completed     Pneumococcal Vaccine: 65+ Years  Completed     URINALYSIS  Completed     IPV IMMUNIZATION  Aged Out     MENINGITIS IMMUNIZATION  Aged Out     BP Readings from Last 3 Encounters:   23 (!) 142/78   23 126/80   22 (!) 144/80    Wt Readings from Last 3 Encounters:   23 60.4 kg (133 lb 4 oz)   23 59.4 kg (131 lb)   22 61.2 kg (135 lb)                  Patient Active Problem List   Diagnosis     Transient cerebral ischemia     Displacement of lumbar intervertebral disc without myelopathy     Gout     HYPERLIPIDEMIA  LDL GOAL <100     Lumbar radiculopathy     Advanced directives, counseling/discussion     Cerebral infarction (H)     Hypertension goal BP (blood pressure) < 150/90     Cystocele, midline     Rheumatoid arthritis involving multiple sites with positive rheumatoid factor (H)     Past Surgical History:   Procedure Laterality Date     COLONOSCOPY  07     HC DILATION/CURETTAGE DIAG/THER NON OB  1984    D & C     LASER YAG CAPSULOTOMY Left 2022    Procedure: CAPSULOTOMY, LENS, USING YAG LASER;  Surgeon: Shakeel Christianson MD;  Location: [facility]     LOBECTOMY LUNG Right 2015    Procedure: LOBECTOMY LUNG;  Surgeon: Yariel Lund MD;  Location: [facility] OR     PHACOEMULSIFICATION WITH STANDARD INTRAOCULAR LENS IMPLANT Right 5/3/2018    Procedure: PHACOEMULSIFICATION WITH STANDARD INTRAOCULAR LENS IMPLANT;  PHACOEMULSIFICATION WITH STANDARD INTRAOCULAR LENS IMPLANT RIGHT;  Surgeon: Chevy Neves MD;  Location: [facility] OR     PHACOEMULSIFICATION WITH STANDARD INTRAOCULAR LENS IMPLANT Left 2018    Procedure: PHACOEMULSIFICATION WITH STANDARD INTRAOCULAR LENS IMPLANT;  PHACOEMULSIFICATION WITH STANDARD INTRAOCULAR LENS IMPLANT LEFT EYE;  Surgeon: Chevy Neves MD;  Location: [facility]     SURGICAL HISTORY OF -       Left leg fx--had pinning     THORACOTOMY Right 2015    Procedure: THORACOTOMY;  Surgeon: Yariel Lund MD;  Location: [facility] OR     ZZHC COLONOSCOPY W BIOPSY  07       Social History     Tobacco Use     Smoking status: Former     Packs/day: 0.00     Types: Cigarettes     Quit date: 3/16/1985     Years since quittin.4     Smokeless tobacco: Never   Substance Use Topics     Alcohol use: No     Alcohol/week: 0.0 standard drinks of alcohol     Family History   Problem Relation Age of Onset     Hypertension Father      Heart Disease Father         Fatal MI at 64     Diabetes Mother         Fatal complication of DM at 70     Hypertension Mother      Cancer  Maternal Aunt         Stomach cancer x3     Gastrointestinal Disease Maternal Uncle         Bleeding ulcer - fatal     Diabetes Paternal Grandfather      Heart Disease Paternal Uncle         MI     Heart Disease Maternal Uncle         MI - fatal     Cancer Paternal Aunt         Breast     Circulatory Paternal Aunt         Aortic aneurysm     Breast Cancer Daughter 54         Current Outpatient Medications   Medication Sig Dispense Refill     amLODIPine (NORVASC) 5 MG tablet Take 1 tablet (5 mg) by mouth daily 90 tablet 3     atenolol (TENORMIN) 100 MG tablet Take 1 tablet (100 mg) by mouth 2 times daily 180 tablet 3     folic acid (FOLVITE) 1 MG tablet Take 1 tablet (1 mg) by mouth daily 90 tablet 3     losartan (COZAAR) 100 MG tablet Take 1 tablet (100 mg) by mouth daily 90 tablet 3     methotrexate 2.5 MG tablet Take 4 tablets (10 mg) by mouth every 7 days Labs every 12 weeks. 55 tablet 4     simvastatin (ZOCOR) 20 MG tablet Take 1 tablet (20 mg) by mouth At Bedtime 90 tablet 3         Mammogram Screening - Patient over age 75, has elected to discontinue screenings.  Pertinent mammograms are reviewed under the imaging tab.    Review of Systems   Constitutional:  Negative for chills and fever.   HENT:  Negative for congestion, ear pain, hearing loss and sore throat.    Eyes:  Negative for pain and visual disturbance.   Respiratory:  Negative for cough and shortness of breath.    Cardiovascular:  Negative for chest pain, palpitations and peripheral edema.   Gastrointestinal:  Negative for abdominal pain, constipation, diarrhea, heartburn, hematochezia and nausea.   Breasts:  Negative for tenderness, breast mass and discharge.   Genitourinary:  Negative for dysuria, frequency, genital sores, hematuria, pelvic pain, urgency, vaginal bleeding and vaginal discharge.   Musculoskeletal:  Negative for arthralgias, joint swelling and myalgias.   Skin:  Negative for rash.   Neurological:  Negative for dizziness, weakness,  "headaches and paresthesias.   Psychiatric/Behavioral:  Negative for mood changes. The patient is not nervous/anxious.      Constitutional, HEENT, cardiovascular, pulmonary, gi and gu systems are negative, except as otherwise noted.    OBJECTIVE:   BP (!) 154/82   Pulse 50   Temp 97.6  F (36.4  C) (Temporal)   Resp 18   Ht 1.6 m (5' 3\")   Wt 60.4 kg (133 lb 4 oz)   LMP  (LMP Unknown)   SpO2 98%   BMI 23.60 kg/m   Estimated body mass index is 23.6 kg/m  as calculated from the following:    Height as of this encounter: 1.6 m (5' 3\").    Weight as of this encounter: 60.4 kg (133 lb 4 oz).  Physical Exam  GENERAL: healthy, alert and no distress  NECK: no adenopathy, no asymmetry, masses, or scars and thyroid normal to palpation  RESP: lungs clear to auscultation - no rales, rhonchi or wheezes  CV: regular rate and rhythm, normal S1 S2, no S3 or S4, no murmur, click or rub, no peripheral edema and peripheral pulses strong  ABDOMEN: soft, nontender, no hepatosplenomegaly, no masses and bowel sounds normal  MS: no gross musculoskeletal defects noted, no edema  SKIN: Following with dermatology and having multiple treatments for precancerous skin issues.  NEURO: Normal strength and tone, mentation intact and speech normal  BACK: no CVA tenderness, no paralumbar tenderness  PSYCH: mentation appears normal, affect normal/bright    Diagnostic Test Results:  Labs reviewed in Epic  Results for orders placed or performed in visit on 08/29/23 (from the past 24 hour(s))   Lipid panel reflex to direct LDL Fasting   Result Value Ref Range    Cholesterol 143 <200 mg/dL    Triglycerides 82 <150 mg/dL    Direct Measure HDL 62 >=50 mg/dL    LDL Cholesterol Calculated 65 <=100 mg/dL    Non HDL Cholesterol 81 <130 mg/dL    Narrative    Cholesterol  Desirable:  <200 mg/dL    Triglycerides  Normal:  Less than 150 mg/dL  Borderline High:  150-199 mg/dL  High:  200-499 mg/dL  Very High:  Greater than or equal to 500 mg/dL    Direct " Measure HDL  Female:  Greater than or equal to 50 mg/dL   Male:  Greater than or equal to 40 mg/dL    LDL Cholesterol  Desirable:  <100mg/dL  Above Desirable:  100-129 mg/dL   Borderline High:  130-159 mg/dL   High:  160-189 mg/dL   Very High:  >= 190 mg/dL    Non HDL Cholesterol  Desirable:  130 mg/dL  Above Desirable:  130-159 mg/dL  Borderline High:  160-189 mg/dL  High:  190-219 mg/dL  Very High:  Greater than or equal to 220 mg/dL   Comprehensive metabolic panel (BMP + Alb, Alk Phos, ALT, AST, Total. Bili, TP)   Result Value Ref Range    Sodium 141 136 - 145 mmol/L    Potassium 4.1 3.4 - 5.3 mmol/L    Chloride 104 98 - 107 mmol/L    Carbon Dioxide (CO2) 24 22 - 29 mmol/L    Anion Gap 13 7 - 15 mmol/L    Urea Nitrogen 14.1 8.0 - 23.0 mg/dL    Creatinine 1.00 (H) 0.51 - 0.95 mg/dL    Calcium 9.8 8.8 - 10.2 mg/dL    Glucose 121 (H) 70 - 99 mg/dL    Alkaline Phosphatase 107 (H) 35 - 104 U/L    AST 24 0 - 45 U/L    ALT 21 0 - 50 U/L    Protein Total 7.2 6.4 - 8.3 g/dL    Albumin 4.6 3.5 - 5.2 g/dL    Bilirubin Total 1.0 <=1.2 mg/dL    GFR Estimate 55 (L) >60 mL/min/1.73m2       ASSESSMENT / PLAN:       ICD-10-CM    1. Encounter for Medicare annual wellness exam  Z00.00       2. Hypertension goal BP (blood pressure) < 150/90  I10 Albumin Random Urine Quantitative with Creat Ratio     atenolol (TENORMIN) 100 MG tablet     losartan (COZAAR) 100 MG tablet     amLODIPine (NORVASC) 5 MG tablet     Comprehensive metabolic panel (BMP + Alb, Alk Phos, ALT, AST, Total. Bili, TP)     Comprehensive metabolic panel (BMP + Alb, Alk Phos, ALT, AST, Total. Bili, TP)      3. Rheumatoid arthritis involving multiple sites with positive rheumatoid factor (H)  M05.79 folic acid (FOLVITE) 1 MG tablet     methotrexate 2.5 MG tablet      4. Hyperlipidemia LDL goal <130  E78.5 Lipid panel reflex to direct LDL Fasting     simvastatin (ZOCOR) 20 MG tablet     Lipid panel reflex to direct LDL Fasting      5. Elevated creatine kinase  R74.8  **Basic metabolic panel FUTURE 2mo        No change in her medications at this time we are going to stop her aspirin because the risk outweigh the benefits at her age.  Her creatinine was up just slightly we will recheck that in 2 months.  She does live with her daughter so she does have someone to be around and keep track of her but she is a very active woman very mentally sound also.          COUNSELING:  Reviewed preventive health counseling, as reflected in patient instructions       Regular exercise       Healthy diet/nutrition       Fall risk prevention       Folic Acid        She reports that she quit smoking about 38 years ago. Her smoking use included cigarettes. She has never used smokeless tobacco.      Appropriate preventive services were discussed with this patient, including applicable screening as appropriate for cardiovascular disease, diabetes, osteopenia/osteoporosis, and glaucoma.  As appropriate for age/gender, discussed screening for colorectal cancer, prostate cancer, breast cancer, and cervical cancer. Checklist reviewing preventive services available has been given to the patient.    Reviewed patients plan of care and provided an AVS. The Basic Care Plan (routine screening as documented in Health Maintenance) for Keyla meets the Care Plan requirement. This Care Plan has been established and reviewed with the Patient.      Weston Raines MD  [facility]    Identified Health Risks:  Age related risks addressed

## 2023-08-29 NOTE — PROGRESS NOTES
She is at risk for lack of exercise and has been provided with information to increase physical activity for the benefit of her well-being.

## 2023-08-29 NOTE — PATIENT INSTRUCTIONS
"Patient Education   Personalized Prevention Plan  You are due for the preventive services outlined below.  Your care team is available to assist you in scheduling these services.  If you have already completed any of these items, please share that information with your care team to update in your medical record.  Health Maintenance Due   Topic Date Due     ANNUAL REVIEW OF HM ORDERS  Never done     Zoster (Shingles) Vaccine (1 of 2) Never done     Diptheria Tetanus Pertussis (DTAP/TDAP/TD) Vaccine (1 - Tdap) 01/27/2009     Annual Wellness Visit  10/09/2020     COVID-19 Vaccine (4 - Booster for Moderna series) 02/01/2022     Cholesterol Lab  04/22/2022     Kidney Microalbumin Urine Test  04/22/2022     Basic Metabolic Panel  04/28/2023     Flu Vaccine (1) 09/01/2023     Learning About Being Physically Active  What is physical activity?     Being physically active means doing any kind of activity that gets your body moving.  The types of physical activity that can help you get fit and stay healthy include:  Aerobic or \"cardio\" activities. These make your heart beat faster and make you breathe harder, such as brisk walking, riding a bike, or running. They strengthen your heart and lungs and build up your endurance.  Strength training activities. These make your muscles work against, or \"resist,\" something. Examples include lifting weights or doing push-ups. These activities help tone and strengthen your muscles and bones.  Stretches. These let you move your joints and muscles through their full range of motion. Stretching helps you be more flexible.  Reaching a balance between these three types of physical activity is important because each one contributes to your overall fitness.  What are the benefits of being active?  Being active is one of the best things you can do for your health. It helps you to:  Feel stronger and have more energy to do all the things you like to do.  Focus better at school or work.  Feel, " "think, and sleep better.  Reach and stay at a healthy weight.  Lose fat and build lean muscle.  Lower your risk for serious health problems, including diabetes, heart attack, high blood pressure, and some cancers.  Keep your heart, lungs, bones, muscles, and joints strong and healthy.  How can you make being active part of your life?  Start slowly. Make it your long-term goal to get at least 30 minutes of exercise on most days of the week. Walking is a good choice. You also may want to do other activities, such as running, swimming, cycling, or playing tennis or team sports.  Pick activities that you like--ones that make your heart beat faster, your muscles stronger, and your muscles and joints more flexible. If you find more than one thing you like doing, do them all. You don't have to do the same thing every day.  Get your heart pumping every day. Any activity that makes your heart beat faster and keeps it at that rate for a while counts.  Here are some great ways to get your heart beating faster:  Go for a brisk walk, run, or bike ride.  Go for a hike or swim.  Go in-line skating.  Play a game of touch football, basketball, or soccer.  Ride a bike.  Play tennis or racquetball.  Climb stairs.  Even some household chores can be aerobic--just do them at a faster pace. Vacuuming, raking or mowing the lawn, sweeping the garage, and washing and waxing the car all can help get your heart rate up.  Strengthen your muscles during the week. You don't have to lift heavy weights or grow big, bulky muscles to get stronger. Doing a few simple activities that make your muscles work against, or \"resist,\" something can help you get stronger.  For example, you can:  Do push-ups or sit-ups, which use your own body weight as resistance.  Lift weights or dumbbells or use stretch bands at home or in a gym or community center.  Stretch your muscles often. Stretching will help you as you become more active. It can help you stay " "flexible, loosen tight muscles, and avoid injury. It can also help improve your balance and posture and can be a great way to relax.  Be sure to stretch the muscles you'll be using when you work out. It's best to warm your muscles slightly before you stretch them. Walk or do some other light aerobic activity for a few minutes, and then start stretching.  When you stretch your muscles:  Do it slowly. Stretching is not about going fast or making sudden movements.  Don't push or bounce during a stretch.  Hold each stretch for at least 15 to 30 seconds, if you can. You should feel a stretch in the muscle, but not pain.  Breathe out as you do the stretch. Then breathe in as you hold the stretch. Don't hold your breath.  If you're worried about how more activity might affect your health, have a checkup before you start. Follow any special advice your doctor gives you for getting a smart start.  Where can you learn more?  Go to https://www.[employer].net/patiented  Enter W332 in the search box to learn more about \"Learning About Being Physically Active.\"  Current as of: October 10, 2022               Content Version: 13.7    9484-9657 [employer].   Care instructions adapted under license by your healthcare professional. If you have questions about a medical condition or this instruction, always ask your healthcare professional. [employer] disclaims any warranty or liability for your use of this information.         "

## 2023-08-29 NOTE — TELEPHONE ENCOUNTER
----- Message from Veronica Ozuna CMA sent at 8/29/2023 11:07 AM CDT -----    ----- Message -----  From: Weston Raines MD  Sent: 8/29/2023  10:35 AM CDT  To: [facility]    Call the patient her kidney test was just off normal so I want to recheck it the first week of October tell her I put a order in and help her get a lab appt   Nothing to worry about just rechecking to be safe

## 2024-04-03 DIAGNOSIS — I10 HYPERTENSION GOAL BP (BLOOD PRESSURE) < 150/90: ICD-10-CM

## 2024-04-03 RX ORDER — LOSARTAN POTASSIUM 100 MG/1
100 TABLET ORAL DAILY
Qty: 90 TABLET | Refills: 3 | Status: CANCELLED | OUTPATIENT
Start: 2024-04-03

## 2024-04-03 RX ORDER — LOSARTAN POTASSIUM 100 MG/1
100 TABLET ORAL DAILY
Qty: 90 TABLET | Refills: 0 | Status: SHIPPED | OUTPATIENT
Start: 2024-04-03 | End: 2024-05-21

## 2024-05-21 ENCOUNTER — OFFICE VISIT (OUTPATIENT)
Dept: FAMILY MEDICINE | Facility: CLINIC | Age: 84
End: 2024-05-21
Payer: COMMERCIAL

## 2024-05-21 VITALS
WEIGHT: 138 LBS | SYSTOLIC BLOOD PRESSURE: 148 MMHG | TEMPERATURE: 98.3 F | BODY MASS INDEX: 24.45 KG/M2 | HEART RATE: 56 BPM | OXYGEN SATURATION: 99 % | RESPIRATION RATE: 16 BRPM | DIASTOLIC BLOOD PRESSURE: 70 MMHG | HEIGHT: 63 IN

## 2024-05-21 DIAGNOSIS — N18.30 STAGE 3 CHRONIC KIDNEY DISEASE, UNSPECIFIED WHETHER STAGE 3A OR 3B CKD (H): ICD-10-CM

## 2024-05-21 DIAGNOSIS — M05.79 RHEUMATOID ARTHRITIS INVOLVING MULTIPLE SITES WITH POSITIVE RHEUMATOID FACTOR (H): ICD-10-CM

## 2024-05-21 DIAGNOSIS — E78.5 HYPERLIPIDEMIA LDL GOAL <130: ICD-10-CM

## 2024-05-21 DIAGNOSIS — Z76.89 ENCOUNTER TO ESTABLISH CARE: Primary | ICD-10-CM

## 2024-05-21 DIAGNOSIS — E78.5 HYPERLIPIDEMIA LDL GOAL <100: ICD-10-CM

## 2024-05-21 DIAGNOSIS — I10 HYPERTENSION GOAL BP (BLOOD PRESSURE) < 150/90: ICD-10-CM

## 2024-05-21 DIAGNOSIS — I63.9 CEREBRAL INFARCTION, UNSPECIFIED MECHANISM (H): ICD-10-CM

## 2024-05-21 LAB
ALBUMIN SERPL BCG-MCNC: 4.6 G/DL (ref 3.5–5.2)
ALP SERPL-CCNC: 123 U/L (ref 40–150)
ALT SERPL W P-5'-P-CCNC: 22 U/L (ref 0–50)
ANION GAP SERPL CALCULATED.3IONS-SCNC: 9 MMOL/L (ref 7–15)
AST SERPL W P-5'-P-CCNC: 23 U/L (ref 0–45)
BILIRUB SERPL-MCNC: 0.8 MG/DL
BUN SERPL-MCNC: 14.2 MG/DL (ref 8–23)
CALCIUM SERPL-MCNC: 9.7 MG/DL (ref 8.8–10.2)
CHLORIDE SERPL-SCNC: 104 MMOL/L (ref 98–107)
CREAT SERPL-MCNC: 1.02 MG/DL (ref 0.51–0.95)
DEPRECATED HCO3 PLAS-SCNC: 26 MMOL/L (ref 22–29)
EGFRCR SERPLBLD CKD-EPI 2021: 53 ML/MIN/1.73M2
ERYTHROCYTE [DISTWIDTH] IN BLOOD BY AUTOMATED COUNT: 13.6 % (ref 10–15)
GLUCOSE SERPL-MCNC: 117 MG/DL (ref 70–99)
HCT VFR BLD AUTO: 44.1 % (ref 35–47)
HGB BLD-MCNC: 14.8 G/DL (ref 11.7–15.7)
MCH RBC QN AUTO: 31.4 PG (ref 26.5–33)
MCHC RBC AUTO-ENTMCNC: 33.6 G/DL (ref 31.5–36.5)
MCV RBC AUTO: 94 FL (ref 78–100)
PLATELET # BLD AUTO: 211 10E3/UL (ref 150–450)
POTASSIUM SERPL-SCNC: 4.3 MMOL/L (ref 3.4–5.3)
PROT SERPL-MCNC: 7.3 G/DL (ref 6.4–8.3)
RBC # BLD AUTO: 4.71 10E6/UL (ref 3.8–5.2)
SODIUM SERPL-SCNC: 139 MMOL/L (ref 135–145)
WBC # BLD AUTO: 7.3 10E3/UL (ref 4–11)

## 2024-05-21 PROCEDURE — 80053 COMPREHEN METABOLIC PANEL: CPT | Performed by: STUDENT IN AN ORGANIZED HEALTH CARE EDUCATION/TRAINING PROGRAM

## 2024-05-21 PROCEDURE — 99214 OFFICE O/P EST MOD 30 MIN: CPT | Performed by: STUDENT IN AN ORGANIZED HEALTH CARE EDUCATION/TRAINING PROGRAM

## 2024-05-21 PROCEDURE — 36415 COLL VENOUS BLD VENIPUNCTURE: CPT | Performed by: STUDENT IN AN ORGANIZED HEALTH CARE EDUCATION/TRAINING PROGRAM

## 2024-05-21 PROCEDURE — 85027 COMPLETE CBC AUTOMATED: CPT | Performed by: STUDENT IN AN ORGANIZED HEALTH CARE EDUCATION/TRAINING PROGRAM

## 2024-05-21 RX ORDER — ATENOLOL 100 MG/1
100 TABLET ORAL 2 TIMES DAILY
Qty: 180 TABLET | Refills: 3 | Status: SHIPPED | OUTPATIENT
Start: 2024-05-21

## 2024-05-21 RX ORDER — LOSARTAN POTASSIUM 100 MG/1
100 TABLET ORAL DAILY
Qty: 90 TABLET | Refills: 3 | Status: SHIPPED | OUTPATIENT
Start: 2024-05-21

## 2024-05-21 RX ORDER — A/SINGAPORE/GP1908/2015 IVR-180 (AN A/MICHIGAN/45/2015 (H1N1)PDM09-LIKE VIRUS, A/HONG KONG/4801/2014, NYMC X-263B (H3N2) (AN A/HONG KONG/4801/2014-LIKE VIRUS), AND B/BRISBANE/60/2008, WILD TYPE (A B/BRISBANE/60/2008-LIKE VIRUS) 15; 15; 15 UG/.5ML; UG/.5ML; UG/.5ML
INJECTION, SUSPENSION INTRAMUSCULAR
COMMUNITY
Start: 2023-11-03

## 2024-05-21 RX ORDER — FOLIC ACID 1 MG/1
1 TABLET ORAL DAILY
Qty: 90 TABLET | Refills: 3 | Status: SHIPPED | OUTPATIENT
Start: 2024-05-21

## 2024-05-21 RX ORDER — SIMVASTATIN 20 MG
20 TABLET ORAL AT BEDTIME
Qty: 90 TABLET | Refills: 3 | Status: SHIPPED | OUTPATIENT
Start: 2024-05-21

## 2024-05-21 RX ORDER — RESPIRATORY SYNCYTIAL VIRUS VACCINE 120MCG/0.5
0.5 KIT INTRAMUSCULAR ONCE
Qty: 1 EACH | Refills: 0 | Status: CANCELLED | OUTPATIENT
Start: 2024-05-21 | End: 2024-05-21

## 2024-05-21 RX ORDER — AMLODIPINE BESYLATE 10 MG/1
10 TABLET ORAL DAILY
Qty: 90 TABLET | Refills: 3 | Status: SHIPPED | OUTPATIENT
Start: 2024-05-21

## 2024-05-21 NOTE — PROGRESS NOTES
Assessment & Plan   Problem List Items Addressed This Visit          Nervous and Auditory    Cerebral infarction (H)       Endocrine    HYPERLIPIDEMIA LDL GOAL <100    Relevant Medications    simvastatin (ZOCOR) 20 MG tablet       Circulatory    Hypertension goal BP (blood pressure) < 150/90    Relevant Medications    amLODIPine (NORVASC) 10 MG tablet    losartan (COZAAR) 100 MG tablet    atenolol (TENORMIN) 100 MG tablet    Other Relevant Orders    Comprehensive metabolic panel (BMP + Alb, Alk Phos, ALT, AST, Total. Bili, TP) (Completed)    CBC with platelets (Completed)       Immune    Rheumatoid arthritis involving multiple sites with positive rheumatoid factor (H)    Relevant Medications    folic acid (FOLVITE) 1 MG tablet       Urinary    Stage 3 chronic kidney disease, unspecified whether stage 3a or 3b CKD (H)     Other Visit Diagnoses       Encounter to establish care    -  Primary    Hyperlipidemia LDL goal <130        Relevant Medications    simvastatin (ZOCOR) 20 MG tablet        Repeat labs today in the setting of her methotrexate use.  Blood pressure still elevated which has been persistent for her.  We did discuss renin and aldosterone testing now as well as increasing amlodipine to 10 mg.  Will plan for continue to monitor at home and follow-up with nursing check in 2 weeks and add labs at that time if persistent.  Consider chlorthalidone or spironolactone addition if not controlled.  It has been stable with simvastatin.  Patient following with dermatology with history of cancer.             Lavinia Ramires is a 84 year old, presenting for the following health issues:  Establish Care and Recheck Medication        5/21/2024    10:06 AM   Additional Questions   Roomed by Neeru     History of Present Illness       Hypertension: She presents for follow up of hypertension.  She does check blood pressure  regularly outside of the clinic. Outpatient blood pressures have not been over 140/90. She  "follows a low salt diet. She consumes 0 sweetened beverage(s) daily. She exercises with enough effort to increase her heart rate 3 or less days per week.   She is taking medications regularly.     Patient here to establish care and follow-up of medications.  Blood pressure still elevated.  Is on methotrexate and folic acid for rheumatoid arthritis diagnosis.  She is otherwise feeling very well    Review of Systems  Constitutional, HEENT, cardiovascular, pulmonary, GI, , musculoskeletal, neuro, skin, endocrine and psych systems are negative, except as otherwise noted.      Objective    BP (!) 148/70   Pulse 56   Temp 98.3  F (36.8  C) (Temporal)   Resp 16   Ht 1.6 m (5' 3\")   Wt 62.6 kg (138 lb)   LMP  (LMP Unknown)   SpO2 99%   BMI 24.45 kg/m    Body mass index is 24.45 kg/m .  Physical Exam   GENERAL: alert and no distress  EYES: Eyes grossly normal to inspection, PERRL and conjunctivae and sclerae normal  HENT: nose and mouth without ulcers or lesions  NECK: no adenopathy, no asymmetry, masses, or scars  RESP: lungs clear to auscultation - no rales, rhonchi or wheezes  CV: regular rate and rhythm, normal S1 S2, no murmur, click or rub, no peripheral edema  ABDOMEN: soft, nontender, no hepatosplenomegaly, no masses and bowel sounds normal  MS: no gross musculoskeletal defects noted, no edema  SKIN: no suspicious lesions or rashes  NEURO: Normal strength and tone, mentation intact and speech normal  PSYCH: mentation appears normal, affect normal/bright          Signed Electronically by: Edin Whitehead MD    "

## 2024-05-21 NOTE — LETTER
May 23, 2024      Keyla Mari  [address] [facility] [id number]        Dear ,    We are writing to inform you of your test results.    Results within expected ranges and slight elevation in glucose.  Continue with current plan of care     Resulted Orders   Comprehensive metabolic panel (BMP + Alb, Alk Phos, ALT, AST, Total. Bili, TP)   Result Value Ref Range    Sodium 139 135 - 145 mmol/L      Comment:      Reference intervals for this test were updated on 09/26/2023 to more accurately reflect our healthy population. There may be differences in the flagging of prior results with similar values performed with this method. Interpretation of those prior results can be made in the context of the updated reference intervals.     Potassium 4.3 3.4 - 5.3 mmol/L    Carbon Dioxide (CO2) 26 22 - 29 mmol/L    Anion Gap 9 7 - 15 mmol/L    Urea Nitrogen 14.2 8.0 - 23.0 mg/dL    Creatinine 1.02 (H) 0.51 - 0.95 mg/dL    GFR Estimate 53 (L) >60 mL/min/1.73m2    Calcium 9.7 8.8 - 10.2 mg/dL    Chloride 104 98 - 107 mmol/L    Glucose 117 (H) 70 - 99 mg/dL    Alkaline Phosphatase 123 40 - 150 U/L    AST 23 0 - 45 U/L      Comment:      Reference intervals for this test were updated on 6/12/2023 to more accurately reflect our healthy population. There may be differences in the flagging of prior results with similar values performed with this method. Interpretation of those prior results can be made in the context of the updated reference intervals.    ALT 22 0 - 50 U/L      Comment:      Reference intervals for this test were updated on 6/12/2023 to more accurately reflect our healthy population. There may be differences in the flagging of prior results with similar values performed with this method. Interpretation of those prior results can be made in the context of the updated reference intervals.      Protein Total 7.3 6.4 - 8.3 g/dL    Albumin 4.6 3.5 - 5.2 g/dL    Bilirubin Total 0.8 <=1.2 mg/dL   CBC  with platelets   Result Value Ref Range    WBC Count 7.3 4.0 - 11.0 10e3/uL    RBC Count 4.71 3.80 - 5.20 10e6/uL    Hemoglobin 14.8 11.7 - 15.7 g/dL    Hematocrit 44.1 35.0 - 47.0 %    MCV 94 78 - 100 fL    MCH 31.4 26.5 - 33.0 pg    MCHC 33.6 31.5 - 36.5 g/dL    RDW 13.6 10.0 - 15.0 %    Platelet Count 211 150 - 450 10e3/uL       If you have any questions or concerns, please call the clinic at the number listed above.       Sincerely,      Edin Whitehead MD

## 2024-06-04 ENCOUNTER — ALLIED HEALTH/NURSE VISIT (OUTPATIENT)
Dept: FAMILY MEDICINE | Facility: CLINIC | Age: 84
End: 2024-06-04
Payer: COMMERCIAL

## 2024-06-04 VITALS — SYSTOLIC BLOOD PRESSURE: 138 MMHG | DIASTOLIC BLOOD PRESSURE: 62 MMHG

## 2024-06-04 DIAGNOSIS — I10 HYPERTENSION GOAL BP (BLOOD PRESSURE) < 150/90: Primary | ICD-10-CM

## 2024-06-04 PROCEDURE — 99207 PR NO CHARGE NURSE ONLY: CPT

## 2024-06-04 NOTE — PROGRESS NOTES
Keyla Mari is a 84 year old patient who comes in today for a Blood Pressure check.  Initial BP:  /62   LMP  (LMP Unknown)      Data Unavailable  Disposition: follow-up as previously indicated by provider and results routed to provider    Kaylie Nieves MA 6/4/2024

## 2024-09-06 DIAGNOSIS — M05.79 RHEUMATOID ARTHRITIS INVOLVING MULTIPLE SITES WITH POSITIVE RHEUMATOID FACTOR (H): ICD-10-CM

## 2024-09-10 RX ORDER — METHOTREXATE 2.5 MG/1
TABLET ORAL
Qty: 55 TABLET | Refills: 4 | Status: SHIPPED | OUTPATIENT
Start: 2024-09-10

## 2025-06-27 DIAGNOSIS — I10 HYPERTENSION GOAL BP (BLOOD PRESSURE) < 150/90: ICD-10-CM

## 2025-06-27 DIAGNOSIS — E78.5 HYPERLIPIDEMIA LDL GOAL <130: ICD-10-CM

## 2025-07-01 RX ORDER — SIMVASTATIN 20 MG
20 TABLET ORAL
Qty: 90 TABLET | Refills: 3 | Status: SHIPPED | OUTPATIENT
Start: 2025-07-01

## 2025-07-01 RX ORDER — ATENOLOL 100 MG/1
100 TABLET ORAL 2 TIMES DAILY
Qty: 180 TABLET | Refills: 3 | Status: SHIPPED | OUTPATIENT
Start: 2025-07-01

## 2025-07-10 ENCOUNTER — OFFICE VISIT (OUTPATIENT)
Dept: FAMILY MEDICINE | Facility: CLINIC | Age: 85
End: 2025-07-10
Payer: COMMERCIAL

## 2025-07-10 VITALS
RESPIRATION RATE: 16 BRPM | OXYGEN SATURATION: 96 % | DIASTOLIC BLOOD PRESSURE: 64 MMHG | HEIGHT: 62 IN | HEART RATE: 56 BPM | SYSTOLIC BLOOD PRESSURE: 134 MMHG | TEMPERATURE: 98.1 F | BODY MASS INDEX: 24.35 KG/M2 | WEIGHT: 132.3 LBS
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
## 2025-07-10 DIAGNOSIS — S50.862A TICK BITE OF LEFT FOREARM, INITIAL ENCOUNTER: ICD-10-CM

## 2025-07-10 DIAGNOSIS — M05.79 RHEUMATOID ARTHRITIS INVOLVING MULTIPLE SITES WITH POSITIVE RHEUMATOID FACTOR (H): ICD-10-CM

## 2025-07-10 DIAGNOSIS — I10 HYPERTENSION GOAL BP (BLOOD PRESSURE) < 150/90: ICD-10-CM

## 2025-07-10 DIAGNOSIS — Z00.00 ENCOUNTER FOR MEDICARE ANNUAL WELLNESS EXAM: Primary | ICD-10-CM

## 2025-07-10 DIAGNOSIS — E78.5 HYPERLIPIDEMIA LDL GOAL <130: ICD-10-CM

## 2025-07-10 DIAGNOSIS — Z23 NEED FOR VACCINATION: ICD-10-CM

## 2025-07-10 DIAGNOSIS — Z13.6 SCREENING FOR CARDIOVASCULAR CONDITION: ICD-10-CM

## 2025-07-10 DIAGNOSIS — N18.30 STAGE 3 CHRONIC KIDNEY DISEASE, UNSPECIFIED WHETHER STAGE 3A OR 3B CKD (H): ICD-10-CM

## 2025-07-10 DIAGNOSIS — E78.5 HYPERLIPIDEMIA LDL GOAL <100: ICD-10-CM

## 2025-07-10 LAB
ALBUMIN SERPL BCG-MCNC: 4.4 G/DL (ref 3.5–5.2)
ALP SERPL-CCNC: 124 U/L (ref 40–150)
ALT SERPL W P-5'-P-CCNC: 27 U/L (ref 0–50)
ANION GAP SERPL CALCULATED.3IONS-SCNC: 13 MMOL/L (ref 7–15)
AST SERPL W P-5'-P-CCNC: 27 U/L (ref 0–45)
BILIRUB SERPL-MCNC: 0.6 MG/DL
BUN SERPL-MCNC: 16.1 MG/DL (ref 8–23)
CALCIUM SERPL-MCNC: 9.4 MG/DL (ref 8.8–10.4)
CHLORIDE SERPL-SCNC: 98 MMOL/L (ref 98–107)
CHOLEST SERPL-MCNC: 134 MG/DL
CREAT SERPL-MCNC: 1.03 MG/DL (ref 0.51–0.95)
CREAT UR-MCNC: 104.1 MG/DL
EGFRCR SERPLBLD CKD-EPI 2021: 52 ML/MIN/1.73M2
ERYTHROCYTE [DISTWIDTH] IN BLOOD BY AUTOMATED COUNT: 13.2 % (ref 10–15)
FASTING STATUS PATIENT QL REPORTED: NO
FASTING STATUS PATIENT QL REPORTED: NO
GLUCOSE SERPL-MCNC: 125 MG/DL (ref 70–99)
HCO3 SERPL-SCNC: 24 MMOL/L (ref 22–29)
HCT VFR BLD AUTO: 40.6 % (ref 35–47)
HDLC SERPL-MCNC: 58 MG/DL
HGB BLD-MCNC: 13.9 G/DL (ref 11.7–15.7)
LDLC SERPL CALC-MCNC: 62 MG/DL
MCH RBC QN AUTO: 30.8 PG (ref 26.5–33)
MCHC RBC AUTO-ENTMCNC: 34.2 G/DL (ref 31.5–36.5)
MCV RBC AUTO: 90 FL (ref 78–100)
MICROALBUMIN UR-MCNC: 17.4 MG/L
MICROALBUMIN/CREAT UR: 16.71 MG/G CR (ref 0–25)
NONHDLC SERPL-MCNC: 76 MG/DL
PLATELET # BLD AUTO: 238 10E3/UL (ref 150–450)
POTASSIUM SERPL-SCNC: 4.4 MMOL/L (ref 3.4–5.3)
PROT SERPL-MCNC: 7.8 G/DL (ref 6.4–8.3)
RBC # BLD AUTO: 4.51 10E6/UL (ref 3.8–5.2)
SODIUM SERPL-SCNC: 135 MMOL/L (ref 135–145)
TRIGL SERPL-MCNC: 68 MG/DL
WBC # BLD AUTO: 10.5 10E3/UL (ref 4–11)

## 2025-07-10 RX ORDER — SIMVASTATIN 20 MG
20 TABLET ORAL AT BEDTIME
Qty: 90 TABLET | Refills: 4 | Status: SHIPPED | OUTPATIENT
Start: 2025-07-10

## 2025-07-10 RX ORDER — AMLODIPINE BESYLATE 10 MG/1
10 TABLET ORAL DAILY
Qty: 90 TABLET | Refills: 4 | Status: SHIPPED | OUTPATIENT
Start: 2025-07-10

## 2025-07-10 RX ORDER — DOXYCYCLINE 100 MG/1
100 CAPSULE ORAL 2 TIMES DAILY
Qty: 20 CAPSULE | Refills: 0 | Status: SHIPPED | OUTPATIENT
Start: 2025-07-10 | End: 2025-07-20

## 2025-07-10 RX ORDER — METHOTREXATE 2.5 MG/1
10 TABLET ORAL
Qty: 55 TABLET | Refills: 1 | Status: SHIPPED | OUTPATIENT
Start: 2025-07-10

## 2025-07-10 RX ORDER — ATENOLOL 100 MG/1
100 TABLET ORAL 2 TIMES DAILY
Qty: 180 TABLET | Refills: 4 | Status: SHIPPED | OUTPATIENT
Start: 2025-07-10

## 2025-07-10 RX ORDER — LOSARTAN POTASSIUM 100 MG/1
100 TABLET ORAL DAILY
Qty: 90 TABLET | Refills: 4 | Status: SHIPPED | OUTPATIENT
Start: 2025-07-10

## 2025-07-10 RX ORDER — FOLIC ACID 1 MG/1
1000 TABLET ORAL DAILY
Qty: 90 TABLET | Refills: 4 | Status: SHIPPED | OUTPATIENT
Start: 2025-07-10

## 2025-07-10 SDOH — HEALTH STABILITY: PHYSICAL HEALTH: ON AVERAGE, HOW MANY DAYS PER WEEK DO YOU ENGAGE IN MODERATE TO STRENUOUS EXERCISE (LIKE A BRISK WALK)?: 3 DAYS

## 2025-07-10 ASSESSMENT — SOCIAL DETERMINANTS OF HEALTH (SDOH): HOW OFTEN DO YOU GET TOGETHER WITH FRIENDS OR RELATIVES?: TWICE A WEEK

## 2025-07-10 ASSESSMENT — PAIN SCALES - GENERAL: PAINLEVEL_OUTOF10: MODERATE PAIN (5)

## 2025-07-10 NOTE — PATIENT INSTRUCTIONS
Patient Education   Preventive Care Advice   This is general advice given by our system to help you stay healthy. However, your care team may have specific advice just for you. Please talk to your care team about your preventive care needs.  Nutrition  Eat 5 or more servings of fruits and vegetables each day.  Try wheat bread, brown rice and whole grain pasta (instead of white bread, rice, and pasta).  Get enough calcium and vitamin D. Check the label on foods and aim for 100% of the RDA (recommended daily allowance).  Lifestyle  Exercise at least 150 minutes each week  (30 minutes a day, 5 days a week).  Do muscle strengthening activities 2 days a week. These help control your weight and prevent disease.  No smoking.  Wear sunscreen to prevent skin cancer.  Have a dental exam and cleaning every 6 months.  Yearly exams  See your health care team every year to talk about:  Any changes in your health.  Any medicines your care team has prescribed.  Preventive care, family planning, and ways to prevent chronic diseases.  Shots (vaccines)   HPV shots (up to age 26), if you've never had them before.  Hepatitis B shots (up to age 59), if you've never had them before.  COVID-19 shot: Get this shot when it's due.  Flu shot: Get a flu shot every year.  Tetanus shot: Get a tetanus shot every 10 years.  Pneumococcal, hepatitis A, and RSV shots: Ask your care team if you need these based on your risk.  Shingles shot (for age 50 and up)  General health tests  Diabetes screening:  Starting at age 35, Get screened for diabetes at least every 3 years.  If you are younger than age 35, ask your care team if you should be screened for diabetes.  Cholesterol test: At age 39, start having a cholesterol test every 5 years, or more often if advised.  Bone density scan (DEXA): At age 50, ask your care team if you should have this scan for osteoporosis (brittle bones).  Hepatitis C: Get tested at least once in your life.  STIs (sexually  transmitted infections)  Before age 24: Ask your care team if you should be screened for STIs.  After age 24: Get screened for STIs if you're at risk. You are at risk for STIs (including HIV) if:  You are sexually active with more than one person.  You don't use condoms every time.  You or a partner was diagnosed with a sexually transmitted infection.  If you are at risk for HIV, ask about PrEP medicine to prevent HIV.  Get tested for HIV at least once in your life, whether you are at risk for HIV or not.  Cancer screening tests  Cervical cancer screening: If you have a cervix, begin getting regular cervical cancer screening tests starting at age 21.  Breast cancer scan (mammogram): If you've ever had breasts, begin having regular mammograms starting at age 40. This is a scan to check for breast cancer.  Colon cancer screening: It is important to start screening for colon cancer at age 45.  Have a colonoscopy test every 10 years (or more often if you're at risk) Or, ask your provider about stool tests like a FIT test every year or Cologuard test every 3 years.  To learn more about your testing options, visit:   .  For help making a decision, visit:   https://bit.ly/yw22577.  Prostate cancer screening test: If you have a prostate, ask your care team if a prostate cancer screening test (PSA) at age 55 is right for you.  Lung cancer screening: If you are a current or former smoker ages 50 to 80, ask your care team if ongoing lung cancer screenings are right for you.  For informational purposes only. Not to replace the advice of your health care provider. Copyright   2023 [facility]. All rights reserved. Clinically reviewed by the [facility]itions [facility]. [employer] [id number] - REV 01/24.  Learning About Activities of Daily Living  What are activities of daily living?     Activities of daily living (ADLs) are the basic self-care tasks you do every day. These include eating, bathing, dressing,  and moving around.  As you age, and if you have health problems, you may find that it's harder to do some of these tasks. If so, your doctor can suggest ideas that may help.  To measure what kind of help you may need, your doctor will ask how well you are able to do ADLs. Let your doctor know if there are any tasks that you are having trouble doing. This is an important first step to getting help. And when you have the help you need, you can stay as independent as possible.  How will a doctor assess your ADLs?  Asking about ADLs is part of a routine health checkup your doctor will likely do as you age. Your health check might be done in a doctor's office, in your home, or at a hospital. The goal is to find out if you are having any problems that could make it hard to care for yourself or that make it unsafe for you to be on your own.  To measure your ADLs, your doctor will ask how hard it is for you to do routine tasks. Your doctor may also want to know if you have changed the way you do a task because of a health problem. Your doctor may watch how you:  Walk back and forth.  Keep your balance while you stand or walk.  Move from sitting to standing or from a bed to a chair.  Button or unbutton a shirt or sweater.  Remove and put on your shoes.  It's common to feel a little worried or anxious if you find you can't do all the things you used to be able to do. Talking with your doctor about ADLs is a way to make sure you're as safe as possible and able to care for yourself as well as you can. You may want to bring a caregiver, friend, or family member to your checkup. They can help you talk to your doctor.  Follow-up care is a key part of your treatment and safety. Be sure to make and go to all appointments, and call your doctor if you are having problems. It's also a good idea to know your test results and keep a list of the medicines you take.  Current as of: October 24, 2024  Content Version: 14.5    1582-7196  [employer].   Care instructions adapted under license by your healthcare professional. If you have questions about a medical condition or this instruction, always ask your healthcare professional. [employer] disclaims any warranty or liability for your use of this information.    Preventing Falls: Care Instructions  Injuries and health problems such as trouble walking or poor eyesight can increase your risk of falling. So can some medicines. But there are things you can do to help prevent falls. You can exercise to get stronger. You can also arrange your home to make it safer.    Talk to your doctor about the medicines you take. Ask if any of them increase the risk of falls and whether they can be changed or stopped.   Try to exercise regularly. It can help improve your strength and balance. This can help lower your risk of falling.         Practice fall safety and prevention.   Wear low-heeled shoes that fit well and give your feet good support. Talk to your doctor if you have foot problems that make this hard.  Carry a cellphone or wear a medical alert device that you can use to call for help.  Use stepladders instead of chairs to reach high objects. Don't climb if you're at risk for falls. Ask for help, if needed.  Wear the correct eyeglasses, if you need them.        Make your home safer.   Remove rugs, cords, clutter, and furniture from walkways.  Keep your house well lit. Use night-lights in hallways and bathrooms.  Install and use sturdy handrails on stairways.  Wear nonskid footwear, even inside. Don't walk barefoot or in socks without shoes.        Be safe outside.   Use handrails, curb cuts, and ramps whenever possible.  Keep your hands free by using a shoulder bag or backpack.  Try to walk in well-lit areas. Watch out for uneven ground, changes in pavement, and debris.  Be careful in the winter. Walk on the grass or gravel when sidewalks are slippery. Use de-icer on steps and  "walkways. Add non-slip devices to shoes.    Put grab bars and nonskid mats in your shower or tub and near the toilet. Try to use a shower chair or bath bench when bathing.   Get into a tub or shower by putting in your weaker leg first. Get out with your strong side first. Have a phone or medical alert device in the bathroom with you.   Where can you learn more?  Go to https://www.[employer].net/patiented  Enter G117 in the search box to learn more about \"Preventing Falls: Care Instructions.\"  Current as of: July 31, 2024  Content Version: 14.5 2024-2025 [employer].   Care instructions adapted under license by your healthcare professional. If you have questions about a medical condition or this instruction, always ask your healthcare professional. [employer] disclaims any warranty or liability for your use of this information.       "

## 2025-07-10 NOTE — PROGRESS NOTES
Preventive Care Visit  [facility]  Edin Whitehead MD, Family Medicine  Jul 10, 2025      Assessment & Plan   Problem List Items Addressed This Visit          Cardiovascular/Peripheral Vascular    Hypertension goal BP (blood pressure) < 150/90    Relevant Medications    amLODIPine (NORVASC) 10 MG tablet    atenolol (TENORMIN) 100 MG tablet    losartan (COZAAR) 100 MG tablet    Other Relevant Orders    Comprehensive metabolic panel (BMP + Alb, Alk Phos, ALT, AST, Total. Bili, TP) (Completed)    CBC with platelets (Completed)       Endocrine    HYPERLIPIDEMIA LDL GOAL <100    Relevant Medications    simvastatin (ZOCOR) 20 MG tablet    Other Relevant Orders    Lipid panel reflex to direct LDL Non-fasting (Completed)       Genitourinary/Renal    Stage 3 chronic kidney disease, unspecified whether stage 3a or 3b CKD (H)    Relevant Orders    Albumin Random Urine Quantitative with Creat Ratio       Rheumatology    Rheumatoid arthritis involving multiple sites with positive rheumatoid factor (H)    Relevant Medications    folic acid (FOLVITE) 1 MG tablet    methotrexate 2.5 MG tablet     Other Visit Diagnoses         Encounter for Medicare annual wellness exam    -  Primary      Hyperlipidemia LDL goal <130        Relevant Medications    simvastatin (ZOCOR) 20 MG tablet      Need for vaccination          Screening for cardiovascular condition          Tick bite of left forearm, initial encounter        Relevant Medications    doxycycline hyclate (VIBRAMYCIN) 100 MG capsule    Other Relevant Orders    LYME DISEASE TOTAL ANTIBODIES WITH REFLEX TO CONFIRMATION           Age-appropriate screening and immunization discussed.  Blood pressure stable now continue with current medications.  Repeat labs today in the setting of her blood pressure as well as continue methotrexate use.  Joint stable and she will follow-up with rheumatology.  She has a follow-up with dermatology regarding recent  biopsies.  Cardiovascular risk factors reviewed.  Local rash more concerning for cellulitis with recent tick bite but will test for Lyme's and plan to treat with course of doxycycline that would be sufficient coverage.  She will let me know if things not improving or new or worsening symptoms arise.  Precautions in the future discussed.  She understands potential side effects of medication.       Patient has been advised of split billing requirements and indicates understanding: Yes    The longitudinal plan of care for the diagnosis(es)/condition(s) as documented were addressed during this visit. Due to the added complexity in care, I will continue to support Keyla in the subsequent management and with ongoing continuity of care.      Counseling  Appropriate preventive services were addressed with this patient via screening, questionnaire, or discussion as appropriate for fall prevention, nutrition, physical activity, Tobacco-use cessation, social engagement, weight loss and cognition.  Checklist reviewing preventive services available has been given to the patient.  Reviewed patient's diet, addressing concerns and/or questions.   She is at risk for lack of exercise and has been provided with information to increase physical activity for the benefit of her well-being.   The patient was instructed to see the dentist every 6 months.   Updated plan of care.  Patient reported difficulty with activities of daily living were addressed today.Patient reported safety concerns were addressed today.Reviewed preventive health counseling, as reflected in patient instructions       Regular exercise       Healthy diet/nutrition       Vision screening       Hearing screening       Alcohol use       Osteoporosis prevention/bone health       Colorectal Cancer Screening       Advance Care Planning        Subjective   Keyla is a 85 year old, presenting for the following:  Wellness Visit        7/10/2025     8:51 AM   Additional  Questions   Roomed by Trish TOMAS          HPI    Tick bight 1 month ago on left forearm and has checked with today that appears to be a deer tick.  Was not bothersome but noted rash starting on her left forearm 2 weeks after bite and has persisted.  Notes spreading up her arm and feels warm.  Does not have significant itching or discomfort.  No other rashes.  No other headache or vision changes.  No fevers.  No joint pain and she feels normal.  No other recent illness.  Joints have been good with her methotrexate and folic acid.  She does need follow-up with rheumatology.  Blood pressure stable now with current medications.    Advance Care Planning    Document on file is a Health Care Directive or POLST.        7/10/2025   General Health   How would you rate your overall physical health? Good   Feel stress (tense, anxious, or unable to sleep) Not at all         7/10/2025   Nutrition   Diet: Regular (no restrictions)         7/10/2025   Exercise   Days per week of moderate/strenous exercise 3 days         7/10/2025   Social Factors   Frequency of gathering with friends or relatives Twice a week   Worry food won't last until get money to buy more No   Food not last or not have enough money for food? No   Do you have housing? (Housing is defined as stable permanent housing and does not include staying outside in a car, in a tent, in an abandoned building, in an overnight shelter, or couch-surfing.) Yes   Are you worried about losing your housing? No   Lack of transportation? No   Unable to get utilities (heat,electricity)? No         7/10/2025   Fall Risk   Fallen 2 or more times in the past year? No   Trouble with walking or balance? Yes   Gait Speed Test (Document in seconds) 4.7   Gait Speed Test Interpretation Less than or equal to 5.00 seconds - PASS          7/10/2025   Activities of Daily Living- Home Safety   Needs help with the following daily activites Transportation   Do you have the help that you need? Yes  for Some   Safety concerns in the home No grab bars in the bathroom    No handrails on the stairs       Multiple values from one day are sorted in reverse-chronological order         7/10/2025   Dental   Dentist two times every year? (!) NO         7/10/2025   Hearing Screening   Hearing concerns? None of the above         7/10/2025   Driving Risk Screening   Patient/family members have concerns about driving No         7/10/2025   General Alertness/Fatigue Screening   Have you been more tired than usual lately? No         7/10/2025   Urinary Incontinence Screening   Bothered by leaking urine in past 6 months No         Today's PHQ-2 Score:       7/10/2025     8:47 AM   PHQ-2 (  [employer])   Q1: Little interest or pleasure in doing things 0   Q2: Feeling down, depressed or hopeless 0   PHQ-2 Score 0    Q1: Little interest or pleasure in doing things Not at all   Q2: Feeling down, depressed or hopeless Not at all   PHQ-2 Score 0       Patient-reported           7/10/2025   Substance Use   Alcohol more than 3/day or more than 7/wk No   Do you have a current opioid prescription? No   How severe/bad is pain from 1 to 10? 5/10   Do you use any other substances recreationally? No     Social History     Tobacco Use    Smoking status: Former     Current packs/day: 0.00     Types: Cigarettes     Quit date: 3/16/1985     Years since quittin.3    Smokeless tobacco: Never   Vaping Use    Vaping status: Never Used   Substance Use Topics    Alcohol use: No     Alcohol/week: 0.0 standard drinks of alcohol    Drug use: No          Mammogram Screening - After age 74- determine frequency with patient based on health status, life expectancy and patient goals          Reviewed and updated as needed this visit by Provider                    Past Medical History:   Diagnosis Date    Acute, but ill-defined, cerebrovascular disease     Basal cell carcinoma     Closed fracture of unspecified part of neck of femur     Hip fracture     CVA (cerebral infarction) 2005    Elevated blood pressure reading without diagnosis of hypertension     Hemorrhage of gastrointestinal tract, unspecified 08/13/2007    Transfer to [facility]    History of blood transfusion     after colonoscopy    HTN (hypertension)     Hyperlipidaemia     Other and unspecified hyperlipidemia     PONV (postoperative nausea and vomiting)     Unspecified cerebral artery occlusion with cerebral infarction      Past Surgical History:   Procedure Laterality Date    COLONOSCOPY  08/13/07    HC DILATION/CURETTAGE DIAG/THER NON OB  1984    D & C    LASER YAG CAPSULOTOMY Left 11/17/2022    Procedure: CAPSULOTOMY, LENS, USING YAG LASER;  Surgeon: Shakeel Christianson MD;  Location: [facility] OR    LOBECTOMY LUNG Right 6/5/2015    Procedure: LOBECTOMY LUNG;  Surgeon: Yariel Lund MD;  Location: [facility] OR    PHACOEMULSIFICATION WITH STANDARD INTRAOCULAR LENS IMPLANT Right 5/3/2018    Procedure: PHACOEMULSIFICATION WITH STANDARD INTRAOCULAR LENS IMPLANT;  PHACOEMULSIFICATION WITH STANDARD INTRAOCULAR LENS IMPLANT RIGHT;  Surgeon: Chevy Neves MD;  Location: [facility] OR    PHACOEMULSIFICATION WITH STANDARD INTRAOCULAR LENS IMPLANT Left 5/17/2018    Procedure: PHACOEMULSIFICATION WITH STANDARD INTRAOCULAR LENS IMPLANT;  PHACOEMULSIFICATION WITH STANDARD INTRAOCULAR LENS IMPLANT LEFT EYE;  Surgeon: Chevy Neves MD;  Location: [facility] OR    SURGICAL HISTORY OF -       Left leg fx--had pinning    THORACOTOMY Right 6/5/2015    Procedure: THORACOTOMY;  Surgeon: Yariel Lund MD;  Location:  OR    ZZHC COLONOSCOPY W BIOPSY  07/01/07     OB History   No obstetric history on file.     Current providers sharing in care for this patient include:  Patient Care Team:  No Ref-Primary, Physician as PCP - Diego Carrillo MD as MD (Dermatology)  Edin Whitehead MD as Assigned PCP    The following health maintenance items are reviewed in Epic and correct as of  "today:  Health Maintenance   Topic Date Due    ZOSTER VACCINE (1 of 2) Never done    DTAP/TDAP/TD VACCINE (1 - Tdap) 01/27/2009    RSV VACCINE (1 - 1-dose 75+ series) Never done    MICROALBUMIN  08/29/2024    COVID-19 VACCINE (4 - 2024-25 season) 09/01/2024    INFLUENZA VACCINE (1) 09/01/2025    MEDICARE ANNUAL WELLNESS VISIT  07/10/2026    BMP  07/10/2026    LIPID  07/10/2026    CREATININE  07/10/2026    ANNUAL REVIEW OF HM ORDERS  07/10/2026    FALL RISK ASSESSMENT  07/10/2026    HEMOGLOBIN  07/10/2026    DEXA  07/17/2029    ADVANCE CARE PLANNING  07/10/2030    PHQ-2 (once per calendar year)  Completed    PNEUMOCOCCAL VACCINE 50+ YEARS  Completed    URINALYSIS  Completed    HPV VACCINE  Aged Out    MENINGITIS VACCINE  Aged Out         Review of Systems  Constitutional, HEENT, cardiovascular, pulmonary, GI, , musculoskeletal, neuro, skin, endocrine and psych systems are negative, except as otherwise noted.     Objective    Exam  /64   Pulse 56   Temp 98.1  F (36.7  C) (Temporal)   Resp 16   Ht 1.575 m (5' 2\")   Wt 60 kg (132 lb 4.8 oz)   LMP  (LMP Unknown)   SpO2 96%   BMI 24.20 kg/m     Estimated body mass index is 24.2 kg/m  as calculated from the following:    Height as of this encounter: 1.575 m (5' 2\").    Weight as of this encounter: 60 kg (132 lb 4.8 oz).    Physical Exam  GENERAL: alert and no distress  EYES: Eyes grossly normal to inspection, PERRL and conjunctivae and sclerae normal  HENT: ear canals and TM's normal, nose and mouth without ulcers or lesions  NECK: no adenopathy, no asymmetry, masses, or scars  RESP: lungs clear to auscultation - no rales, rhonchi or wheezes  CV: regular rate and rhythm, normal S1 S2, no S3 or S4, no murmur, click or rub, no peripheral edema  ABDOMEN: soft, nontender, no hepatosplenomegaly, no masses and bowel sounds normal  MS: no gross musculoskeletal defects noted, no edema  SKIN: erythema on left forearm with mild warmth. No bullseye rash. No discharge " or bite site noted. Scab on left temple and ear from recent biopsy.  NEURO: Normal strength and tone, mentation intact and speech normal  PSYCH: mentation appears normal, affect normal/bright  Gait and balance assessed per Gait Speed Test.  Result as above.         7/10/2025   Mini Cog   Clock Draw Score 2 Normal   3 Item Recall 2 objects recalled   Mini Cog Total Score 4              Signed Electronically by: Edin Whitehead MD

## (undated) DEVICE — NDL 19GA 1.5" FILTER 305200

## (undated) DEVICE — GLOVE PROTEXIS W/NEU-THERA 7.5  2D73TE75

## (undated) DEVICE — NDL ECLIPSE 18GA 1.5"

## (undated) RX ORDER — KETAMINE HCL IN 0.9 % NACL 50 MG/5 ML
SYRINGE (ML) INTRAVENOUS
Status: DISPENSED
Start: 2018-05-03

## (undated) RX ORDER — KETAMINE HCL IN 0.9 % NACL 50 MG/5 ML
SYRINGE (ML) INTRAVENOUS
Status: DISPENSED
Start: 2018-05-17

## (undated) RX ORDER — LIDOCAINE HYDROCHLORIDE 10 MG/ML
INJECTION, SOLUTION EPIDURAL; INFILTRATION; INTRACAUDAL; PERINEURAL
Status: DISPENSED
Start: 2018-05-03